# Patient Record
Sex: MALE | Race: WHITE | Employment: FULL TIME | URBAN - METROPOLITAN AREA
[De-identification: names, ages, dates, MRNs, and addresses within clinical notes are randomized per-mention and may not be internally consistent; named-entity substitution may affect disease eponyms.]

---

## 2021-05-14 ENCOUNTER — APPOINTMENT (OUTPATIENT)
Dept: GENERAL RADIOLOGY | Age: 51
End: 2021-05-14
Payer: COMMERCIAL

## 2021-05-14 ENCOUNTER — HOSPITAL ENCOUNTER (EMERGENCY)
Age: 51
Discharge: HOME | End: 2021-05-14
Payer: COMMERCIAL

## 2021-05-14 VITALS
DIASTOLIC BLOOD PRESSURE: 78 MMHG | RESPIRATION RATE: 18 BRPM | BODY MASS INDEX: 32.2 KG/M2 | OXYGEN SATURATION: 97 % | SYSTOLIC BLOOD PRESSURE: 137 MMHG | WEIGHT: 230 LBS | HEIGHT: 71 IN | HEART RATE: 83 BPM | TEMPERATURE: 97.8 F

## 2021-05-14 DIAGNOSIS — M54.32 SCIATICA OF LEFT SIDE: Primary | ICD-10-CM

## 2021-05-14 PROCEDURE — 6360000002 HC RX W HCPCS: Performed by: NURSE PRACTITIONER

## 2021-05-14 PROCEDURE — 96372 THER/PROPH/DIAG INJ SC/IM: CPT

## 2021-05-14 PROCEDURE — 99284 EMERGENCY DEPT VISIT MOD MDM: CPT

## 2021-05-14 PROCEDURE — 72100 X-RAY EXAM L-S SPINE 2/3 VWS: CPT

## 2021-05-14 RX ORDER — ORPHENADRINE CITRATE 100 MG/1
100 TABLET, EXTENDED RELEASE ORAL 2 TIMES DAILY
Qty: 20 TABLET | Refills: 0 | Status: SHIPPED | OUTPATIENT
Start: 2021-05-14 | End: 2021-05-24

## 2021-05-14 RX ORDER — DEXAMETHASONE SODIUM PHOSPHATE 10 MG/ML
10 INJECTION, SOLUTION INTRAMUSCULAR; INTRAVENOUS ONCE
Status: COMPLETED | OUTPATIENT
Start: 2021-05-14 | End: 2021-05-14

## 2021-05-14 RX ORDER — KETOROLAC TROMETHAMINE 30 MG/ML
30 INJECTION, SOLUTION INTRAMUSCULAR; INTRAVENOUS ONCE
Status: COMPLETED | OUTPATIENT
Start: 2021-05-14 | End: 2021-05-14

## 2021-05-14 RX ORDER — ORPHENADRINE CITRATE 30 MG/ML
60 INJECTION INTRAMUSCULAR; INTRAVENOUS ONCE
Status: COMPLETED | OUTPATIENT
Start: 2021-05-14 | End: 2021-05-14

## 2021-05-14 RX ADMIN — KETOROLAC TROMETHAMINE 30 MG: 30 INJECTION, SOLUTION INTRAMUSCULAR; INTRAVENOUS at 01:05

## 2021-05-14 RX ADMIN — ORPHENADRINE CITRATE 60 MG: 30 INJECTION INTRAMUSCULAR; INTRAVENOUS at 01:06

## 2021-05-14 RX ADMIN — DEXAMETHASONE SODIUM PHOSPHATE 10 MG: 10 INJECTION, SOLUTION INTRAMUSCULAR; INTRAVENOUS at 01:06

## 2021-05-14 ASSESSMENT — PAIN SCALES - GENERAL
PAINLEVEL_OUTOF10: 7
PAINLEVEL_OUTOF10: 4

## 2021-05-14 ASSESSMENT — PAIN DESCRIPTION - PAIN TYPE: TYPE: ACUTE PAIN

## 2021-05-14 NOTE — ED PROVIDER NOTES
[address] [place] [address]  Department of Emergency Medicine   ED  Encounter Note  Admit Date/RoomTime: 2021 12:19 AM  ED Room:     NAME: Alejandra Frankel  : 1970  MRN: [id number]     Chief Complaint:  Leg Pain (L buttock pain that radiates down to knee, no injury)    History of Present Illness       Alejandra Frankel is a 44 y.o. old male with a prior history of recurrent intermittent self limited episodes of low back pain, presents to the emergency department by private vehicle, for non-traumatic acute, sharp left sided middle and lower lumbar spine pain with radiation, to the left knee, for 1 day(s) prior to arrival.  There has been no recent injury as it relates to today's visit. Since onset the symptoms have been persistent and is moderate in severity. He has associated signs & symptoms of nothing additional.   He denies any bladder or bowel incontinence , new weakness, tingling or paresthesias, recent back injection, recent spinal surgery, recent spinal/chiropractic manipulation, history of IVDA, fever, abdominal pain, bladder incontinence, bowel incontinence, bladder retention, bladder urgency, bowel urgency, saddle paresthesias  or sacral numbness. The pain is aggraveated by any movement and relieved by nothing in particular. He is not currently enrolled in an pain management program or managed by PCP or back specialist.      .  ROS   Pertinent positives and negatives are stated within HPI, all other systems reviewed and are negative. Past Medical History:  has a past medical history of Acute exacerbation of chronic obstructive pulmonary disease (COPD) ([address] Acute respiratory failure with hypoxia ([address] CAP (community acquired pneumonia), Hepatitis C reactive, Heroin abuse, and Tobacco abuse. Surgical History:  has a past surgical history that includes Tonsillectomy. Social History:  reports that he has been smoking cigarettes.  He has been smoking about 2.00 packs per day. He has never used smokeless tobacco. He reports that he does not drink alcohol or use drugs. Family History: family history includes Cancer in his father and mother. Allergies: Patient has no known allergies. Physical Exam   Oxygen Saturation Interpretation: Normal.        ED Triage Vitals [05/14/21 0029]   BP Temp Temp Source Pulse Resp SpO2 Height Weight   (!) 144/82 97.8 °F (36.6 °C) Oral 88 -- -- 5' 11\" (1.803 m) 230 lb (104.3 kg)         Constitutional:  Alert, development consistent with age. HEENT:  NC/NT. Airway patent. Neck:  Normal ROM. Supple. Respiratory:  Clear to auscultation and breath sounds equal.  CV:  Regular rate and rhythm, normal heart sounds, without pathological murmurs, ectopy, gallops, or rubs. GI:  Abdomen Soft, nontender, good bowel sounds. No firm or pulsatile mass. Back: middle and lower lumbar spine left sided. Tenderness: None. Swelling: no.              Range of Motion: full range with pain. CVA Tenderness: No.            Straight leg raising:  Left positive at 45 degrees. Skin:  no erythema, rash or swelling noted. Distal Function:              Motor deficit: none. Sensory deficit: none. Pulse deficit: none. Calf Tenderness:  No Bilateral.               Edema:  none Both lower extremity(s). Deep Tendon Reflexes (DTR's) to bilateral knee's and ankle's are normo-reflexive   Gait:  normal.  Integument:  Normal turgor. Warm, dry, without visible rash. Lymphatics: No lymphangitis or adenopathy noted. Neurological:  Oriented. Motor functions intact. Lab / Imaging Results   (All laboratory and radiology results have been personally reviewed by myself)  Labs:  No results found for this visit on 05/14/21. Imaging: All Radiology results interpreted by Radiologist unless otherwise noted.   XR LUMBAR SPINE (2-3 VIEWS)   Final Result Degenerative changes of the lumbar spine with preservation of intervertebral   disc spaces. No compression deformity. ED Course / Medical Decision Making     Medications   ketorolac (TORADOL) injection 30 mg (30 mg Intramuscular Given 5/14/21 0105)   orphenadrine (NORFLEX) injection 60 mg (60 mg Intramuscular Given 5/14/21 0106)   dexamethasone (PF) (DECADRON) injection 10 mg (10 mg Oral Given 5/14/21 0106)      Medical Decision Making:    Patient presented with nontraumatic back pain radiating to his left leg to his left knee. Clinical exam is consistent with sciatica. X-ray is negative for acute pathology. He had no focal neurologic deficits. He appeared well nontoxic. He is appropriate for discharge and outpatient follow-up. He is instructed to return to the emergency department any new or worsening symptoms. Plan of Care/Counseling:  I reviewed today's visit with the patient in addition to providing specific details for the plan of care and counseling regarding the diagnosis and prognosis. Questions are answered at this time and are agreeable with the plan. Assessment      1. Sciatica of left side      Plan   Discharge home. Patient condition is good    New Medications     New Prescriptions    ORPHENADRINE (NORFLEX) 100 MG EXTENDED RELEASE TABLET    Take 1 tablet by mouth 2 times daily for 10 days     Electronically signed by PERCY Urias CNP   DD: 5/14/21  **This report was transcribed using voice recognition software. Every effort was made to ensure accuracy; however, inadvertent computerized transcription errors may be present.   END OF ED PROVIDER NOTE     PERCY Juan CNP  05/14/21 0151

## 2023-02-28 ENCOUNTER — OFFICE VISIT (OUTPATIENT)
Dept: PRIMARY CARE CLINIC | Age: 53
End: 2023-02-28

## 2023-02-28 VITALS
BODY MASS INDEX: 34.58 KG/M2 | HEIGHT: 71 IN | WEIGHT: 247 LBS | SYSTOLIC BLOOD PRESSURE: 128 MMHG | DIASTOLIC BLOOD PRESSURE: 83 MMHG | RESPIRATION RATE: 20 BRPM | OXYGEN SATURATION: 92 % | TEMPERATURE: 97.7 F | HEART RATE: 87 BPM

## 2023-02-28 DIAGNOSIS — U07.1 COVID-19 VIRUS INFECTION: Primary | ICD-10-CM

## 2023-02-28 DIAGNOSIS — R06.2 WHEEZING: ICD-10-CM

## 2023-02-28 DIAGNOSIS — R06.02 SOB (SHORTNESS OF BREATH): ICD-10-CM

## 2023-02-28 LAB
INFLUENZA A ANTIGEN, POC: NEGATIVE
INFLUENZA B ANTIGEN, POC: NEGATIVE
LOT EXPIRE DATE: ABNORMAL
LOT KIT NUMBER: ABNORMAL
SARS-COV-2, POC: DETECTED
VALID INTERNAL CONTROL: ABNORMAL
VENDOR AND KIT NAME POC: ABNORMAL

## 2023-02-28 RX ORDER — IPRATROPIUM BROMIDE AND ALBUTEROL SULFATE 2.5; .5 MG/3ML; MG/3ML
1 SOLUTION RESPIRATORY (INHALATION) ONCE
Status: COMPLETED | OUTPATIENT
Start: 2023-02-28 | End: 2023-02-28

## 2023-02-28 RX ORDER — GUAIFENESIN 600 MG/1
600 TABLET, EXTENDED RELEASE ORAL 2 TIMES DAILY
Qty: 30 TABLET | Refills: 0 | Status: SHIPPED | OUTPATIENT
Start: 2023-02-28 | End: 2023-03-15

## 2023-02-28 RX ORDER — DEXAMETHASONE SODIUM PHOSPHATE 10 MG/ML
10 INJECTION INTRAMUSCULAR; INTRAVENOUS ONCE
Status: COMPLETED | OUTPATIENT
Start: 2023-02-28 | End: 2023-02-28

## 2023-02-28 RX ORDER — ALBUTEROL SULFATE 90 UG/1
2 AEROSOL, METERED RESPIRATORY (INHALATION) 4 TIMES DAILY PRN
Qty: 18 G | Refills: 0 | Status: SHIPPED | OUTPATIENT
Start: 2023-02-28

## 2023-02-28 RX ORDER — AMOXICILLIN AND CLAVULANATE POTASSIUM 875; 125 MG/1; MG/1
1 TABLET, FILM COATED ORAL 2 TIMES DAILY
Qty: 20 TABLET | Refills: 0 | Status: SHIPPED | OUTPATIENT
Start: 2023-02-28 | End: 2023-03-10

## 2023-02-28 RX ORDER — DEXAMETHASONE 6 MG/1
6 TABLET ORAL 2 TIMES DAILY WITH MEALS
Qty: 10 TABLET | Refills: 0 | Status: SHIPPED | OUTPATIENT
Start: 2023-02-28 | End: 2023-03-05

## 2023-02-28 RX ORDER — FLUTICASONE PROPIONATE AND SALMETEROL 250; 50 UG/1; UG/1
1 POWDER RESPIRATORY (INHALATION) EVERY 12 HOURS
Qty: 60 EACH | Refills: 0 | Status: SHIPPED | OUTPATIENT
Start: 2023-02-28

## 2023-02-28 RX ORDER — DOXYCYCLINE HYCLATE 100 MG
100 TABLET ORAL 2 TIMES DAILY
Qty: 20 TABLET | Refills: 0 | Status: SHIPPED | OUTPATIENT
Start: 2023-02-28 | End: 2023-03-10

## 2023-02-28 RX ORDER — GABAPENTIN 600 MG/1
TABLET ORAL
COMMUNITY
Start: 2023-02-11

## 2023-02-28 RX ORDER — CEFTRIAXONE 500 MG/1
500 INJECTION, POWDER, FOR SOLUTION INTRAMUSCULAR; INTRAVENOUS ONCE
Status: DISCONTINUED | OUTPATIENT
Start: 2023-02-28 | End: 2023-02-28

## 2023-02-28 RX ORDER — CEFTRIAXONE 1 G/1
500 INJECTION, POWDER, FOR SOLUTION INTRAMUSCULAR; INTRAVENOUS ONCE
Status: COMPLETED | OUTPATIENT
Start: 2023-02-28 | End: 2023-02-28

## 2023-02-28 RX ADMIN — IPRATROPIUM BROMIDE AND ALBUTEROL SULFATE 1 AMPULE: 2.5; .5 SOLUTION RESPIRATORY (INHALATION) at 09:47

## 2023-02-28 RX ADMIN — DEXAMETHASONE SODIUM PHOSPHATE 10 MG: 10 INJECTION INTRAMUSCULAR; INTRAVENOUS at 10:41

## 2023-02-28 RX ADMIN — CEFTRIAXONE 500 MG: 1 INJECTION, POWDER, FOR SOLUTION INTRAMUSCULAR; INTRAVENOUS at 10:46

## 2023-02-28 NOTE — PROGRESS NOTES
Chief Complaint:   Chest Congestion, Congestion, Cough, Wheezing, and Shortness of Breath      History of Present Illness   Source of history provided by:  patient. Samra Muniz is a 45 y.o. old male with a past medical history of:   Past Medical History:   Diagnosis Date    Acute exacerbation of chronic obstructive pulmonary disease (COPD) ([facility] 3/14/2014    Acute respiratory failure with hypoxia ([facility] 3/14/2014    CAP (community acquired pneumonia) 3/14/2014    Hepatitis C reactive 3/15/2014    Heroin abuse 3/14/2014    Tobacco abuse 3/14/2014        Pt presents to the [facility] care with a cough/wheezing/chest congestion/chills/SOB for the past 1 week. States the cough is  productive with yellow sputum. No  fever noted. Denies any N/V/D, abdominal pain, CP,  dizziness, or lethargy. Pt has a h/o COPD and Pneumonia. Pt is not currently being treated w/preventative or rescue inhalers. ROS    Unless otherwise stated in this report or unable to obtain because of the patient's clinical or mental status as evidenced by the medical record, this patients's positive and negative responses for Review of Systems, constitutional, psych, eyes, ENT, cardiovascular, respiratory, gastrointestinal, neurological, genitourinary, musculoskeletal, integument systems and systems related to the presenting problem are either stated in the preceding or were not pertinent or were negative for the symptoms and/or complaints related to the medical problem. Past Surgical History:  has a past surgical history that includes Tonsillectomy. Social History:  reports that he has been smoking cigarettes. He has been smoking an average of 2 packs per day. He has never used smokeless tobacco. He reports that he does not drink alcohol and does not use drugs. Family History: family history includes Cancer in his father and mother. Allergies: Patient has no known allergies.     Physical Exam         VS:  Pulse 87   Temp 97.7 °F (36.5 °C)   Resp 20   Ht 5' 11\" (1.803 m)   Wt 247 lb (112 kg)   SpO2 91%   BMI 34.45 kg/m²    Oxygen Saturation Interpretation: Normal.    Constitutional:  Alert, development consistent with age. Ears:  External Ears: Normal bilateral pinna. TM's & External Canals: TM's normal bilaterally without perforation. Canals without erythema or drainage. Nose:   There is no obvious septal defect. Mouth:  Moist bucca mucosa and normal tongue. Throat: Mild posterior pharyngeal erythema without exudates or lesions. Neck:  Supple. There is no obvious adenopathy or neck tenderness. Lungs:   Breath sounds: Normal chest expansion and breath sounds: diffuse wheezing bilaterally- audible at times, cough reproduced w/deep breathing. Initial pulse ox 91%. APPEND:  after Duoneb- some improvement, pulse ox 94-96%  Heart:  Regular rate and rhythm, normal heart sounds, without pathological murmurs, ectopy, gallops, or rubs. Skin:  Normal turgor. Warm, dry, without visible rash. Neurological:  Oriented. Motor functions intact. Lab / Imaging Results   (All laboratory and radiology results have been personally reviewed by myself)  Labs:  Results for orders placed or performed in visit on 23   POCT COVID-19 & Influenza A/B   Result Value Ref Range    VALID INTERNAL CONTROL PASS     Lot/Kit Number [id number]     Lot/Kit  date: 2024     SARS-COV-2, POC Detected (A) Not Detected    Influenza A Antigen, POC Negative Negative    Influenza B Antigen, POC Negative Negative    Vendor and kit name [employer]        Imaging: All Radiology results interpreted by Radiologist unless otherwise noted. No orders to display         Assessment / Plan     Impression(s):  1. Chest congestion    2. COVID-19 virus infection    3. Wheezing    4. SOB (shortness of breath)      Disposition:  Disposition: Advised Covid test is POSITIVE and Influenza A/B is negative. Chest Xray now, Duoneb now. Decadron 10 mg IM. Advised to start all meds as ordered. Will initiate preventative/rescue inhalers to treat COPD. Stay well hydrated. Advised on need to establish w/new PCP for COPD. Advised to go to ED w/any worsening as discussed. APPEND:  chest xray reveals no acute process however xray films reveals possible ground glass opacities      Steps to help prevent the spread of COVID-19 if you are sick  SOURCE - https://nicolasMilabrathayer.info/. html     Stay home except to get medical care   Stay home: People who are mildly ill with COVID-19 are able to isolate at home during their illness. You should restrict activities outside your home, except for getting medical care. Avoid public areas: Do not go to work, school, or public areas. Avoid public transportation: Avoid using public transportation, ride-sharing, or taxis. Separate yourself from other people and animals in your home   Stay away from others: As much as possible, you should stay in a specific room and away from other people in your home. Also, you should use a separate bathroom, if available. Limit contact with pets & animals: You should restrict contact with pets and other animals while you are sick with COVID-19, just like you would around other people. Although there have not been reports of pets or other animals becoming sick with COVID-19, it is still recommended that people sick with COVID-19 limit contact with animals until more information is known about the virus. When possible, have another member of your household care for your animals while you are sick. If you are sick with COVID-19, avoid contact with your pet, including petting, snuggling, being kissed or licked, and sharing food. If you must care for your pet or be around animals while you are sick, wash your hands before and after you interact with pets and wear a facemask. See COVID-19 and Animals for more information.        Other considerations  The ill person should eat/be fed in their room if possible. Non-disposable  items used should be handled with gloves and washed with hot water or in a . Clean hands after handling used  items. If possible, dedicate a lined trash can for the ill person. Use gloves when removing garbage bags, handling, and disposing of trash. Wash hands after handling or disposing of trash. Consider consulting with your local health department about trash disposal guidance if available. Information for Household Members and Caregivers of Someone who is Sick   Call ahead before visiting your doctor   Call ahead: If you have a medical appointment, call the healthcare provider and tell them that you have or may have COVID-19. This will help the healthcare provider's office take steps to keep other people from getting infected or exposed. Wear a facemask if you are sick   If you are sick: You should wear a facemask when you are around other people (e.g., sharing a room or vehicle) or pets and before you enter a healthcare provider's office. If you are caring for others: If the person who is sick is not able to wear a facemask (for example, because it causes trouble breathing), then people who live with the person who is sick should not stay in the same room with them, or they should wear a facemask if they enter a room with the person who is sick. Cover your coughs and sneezes   Cover: Cover your mouth and nose with a tissue when you cough or sneeze. Dispose: Throw used tissues in a lined trash can. Wash hands: Immediately wash your hands with soap and water for at least 20 seconds or, if soap and water are not available, clean your hands with an alcohol-based hand  that contains at least 60% alcohol.   Clean your hands often   Wash hands: Wash your hands often with soap and water for at least 20 seconds, especially after blowing your nose, coughing, or sneezing; going to the bathroom; and before eating or preparing food. Hand : If soap and water are not readily available, use an alcohol-based hand  with at least 60% alcohol, covering all surfaces of your hands and rubbing them together until they feel dry. Soap and water: Soap and water are the best option if hands are visibly dirty. Avoid touching: Avoid touching your eyes, nose, and mouth with unwashed hands. Handwashing Tips   Wet your hands with clean, running water (warm or cold), turn off the tap, and apply soap. Lather your hands by rubbing them together with the soap. Lather the backs of your hands, between your fingers, and under your nails. Scrub your hands for at least 20 seconds. Need a timer? Hum the [place] from beginning to end twice. Rinse your hands well under clean, running water. Dry your hands using a clean towel or air dry them. Avoid sharing personal household items   Do not share: You should not share dishes, drinking glasses, cups, eating utensils, towels, or bedding with other people or pets in your home. Wash thoroughly after use: After using these items, they should be washed thoroughly with soap and water. Clean all high-touch surfaces everyday   Clean and disinfect: Practice routine cleaning of high touch surfaces. High touch surfaces include counters, tabletops, doorknobs, bathroom fixtures, toilets, phones, keyboards, tablets, and bedside tables. Disinfect areas with bodily fluids: Also, clean any surfaces that may have blood, stool, or body fluids on them. Household : Use a household cleaning spray or wipe, according to the label instructions. Labels contain instructions for safe and effective use of the cleaning product including precautions you should take when applying the product, such as wearing gloves and making sure you have good ventilation during use of the product.     Monitor your symptoms   Seek medical attention: Seek prompt medical attention if your illness is worsening     (e.g., difficulty breathing). Call your doctor: Before seeking care, call your healthcare provider and tell them that you have, or are being evaluated for, COVID-19. Wear a facemask when sick: Put on a facemask before you enter the facility. These steps will help the healthcare provider's office to keep other people in the office or waiting room from getting infected or exposed. Alert health department: Ask your healthcare provider to call the local or state health department. Persons who are placed under active monitoring or facilitated self-monitoring should follow instructions provided by their local health department or occupational health professionals, as appropriate. Call 911 if you have a medical emergency: If you have a medical emergency and need to call 911, notify the dispatch personnel that you have, or are being evaluated for COVID-19. If possible, put on a facemask before emergency medical services arrive.

## 2024-02-06 ENCOUNTER — TRANSCRIBE ORDERS (OUTPATIENT)
Dept: ADMINISTRATIVE | Age: 54
End: 2024-02-06

## 2024-02-06 DIAGNOSIS — R60.9 EDEMA, UNSPECIFIED TYPE: Primary | ICD-10-CM

## 2024-03-01 ENCOUNTER — HOSPITAL ENCOUNTER (OUTPATIENT)
Dept: ULTRASOUND IMAGING | Age: 54
Discharge: HOME | End: 2024-03-01
Payer: COMMERCIAL

## 2024-03-01 DIAGNOSIS — R60.9 EDEMA, UNSPECIFIED TYPE: ICD-10-CM

## 2024-03-01 PROCEDURE — 93970 EXTREMITY STUDY: CPT

## 2024-03-03 ENCOUNTER — HOSPITAL ENCOUNTER (INPATIENT)
Age: 54
LOS: 9 days | Discharge: HOME | DRG: 871 | End: 2024-03-12
Attending: EMERGENCY MEDICINE | Admitting: STUDENT IN AN ORGANIZED HEALTH CARE EDUCATION/TRAINING PROGRAM
Payer: COMMERCIAL

## 2024-03-03 ENCOUNTER — APPOINTMENT (OUTPATIENT)
Dept: GENERAL RADIOLOGY | Age: 54
DRG: 871 | End: 2024-03-03
Payer: COMMERCIAL

## 2024-03-03 ENCOUNTER — APPOINTMENT (OUTPATIENT)
Dept: CT IMAGING | Age: 54
DRG: 871 | End: 2024-03-03
Payer: COMMERCIAL

## 2024-03-03 DIAGNOSIS — J18.9 COMMUNITY ACQUIRED PNEUMONIA, UNSPECIFIED LATERALITY: ICD-10-CM

## 2024-03-03 DIAGNOSIS — R65.20 SEVERE SEPSIS (HCC): ICD-10-CM

## 2024-03-03 DIAGNOSIS — J18.9 PNEUMONIA OF BOTH LOWER LOBES DUE TO INFECTIOUS ORGANISM: ICD-10-CM

## 2024-03-03 DIAGNOSIS — J96.01 ACUTE HYPOXIC RESPIRATORY FAILURE (HCC): Primary | ICD-10-CM

## 2024-03-03 DIAGNOSIS — N17.9 AKI (ACUTE KIDNEY INJURY) (HCC): ICD-10-CM

## 2024-03-03 DIAGNOSIS — A41.9 SEVERE SEPSIS (HCC): ICD-10-CM

## 2024-03-03 DIAGNOSIS — J96.01 ACUTE RESPIRATORY FAILURE WITH HYPOXIA (HCC): ICD-10-CM

## 2024-03-03 LAB
ALBUMIN SERPL-MCNC: 4 G/DL (ref 3.5–5.2)
ALP SERPL-CCNC: 82 U/L (ref 40–129)
ALT SERPL-CCNC: 20 U/L (ref 0–40)
ANION GAP SERPL CALCULATED.3IONS-SCNC: 17 MMOL/L (ref 7–16)
AST SERPL-CCNC: 27 U/L (ref 0–39)
B.E.: -2.5 MMOL/L (ref -3–3)
B.E.: -3.2 MMOL/L (ref -3–3)
BASOPHILS # BLD: 0.06 K/UL (ref 0–0.2)
BASOPHILS NFR BLD: 0 % (ref 0–2)
BILIRUB SERPL-MCNC: 0.7 MG/DL (ref 0–1.2)
BNP SERPL-MCNC: 3169 PG/ML (ref 0–125)
BUN SERPL-MCNC: 27 MG/DL (ref 6–20)
CALCIUM SERPL-MCNC: 9.4 MG/DL (ref 8.6–10.2)
CHLORIDE SERPL-SCNC: 93 MMOL/L (ref 98–107)
CO2 SERPL-SCNC: 26 MMOL/L (ref 22–29)
COHB: 2 % (ref 0–1.5)
COHB: 3.3 % (ref 0–1.5)
CREAT SERPL-MCNC: 2.5 MG/DL (ref 0.7–1.2)
CRITICAL: ABNORMAL
CRITICAL: ABNORMAL
DATE ANALYZED: ABNORMAL
DATE ANALYZED: ABNORMAL
DATE OF COLLECTION: ABNORMAL
DATE OF COLLECTION: ABNORMAL
EOSINOPHIL # BLD: 0.05 K/UL (ref 0.05–0.5)
EOSINOPHILS RELATIVE PERCENT: 0 % (ref 0–6)
ERYTHROCYTE [DISTWIDTH] IN BLOOD BY AUTOMATED COUNT: 14.4 % (ref 11.5–15)
FIO2: 60 %
GFR SERPL CREATININE-BSD FRML MDRD: 30 ML/MIN/1.73M2
GLUCOSE SERPL-MCNC: 146 MG/DL (ref 74–99)
HCO3: 21.8 MMOL/L (ref 22–26)
HCO3: 23.7 MMOL/L (ref 22–26)
HCT VFR BLD AUTO: 47.2 % (ref 37–54)
HGB BLD-MCNC: 14.9 G/DL (ref 12.5–16.5)
HHB: 3.9 % (ref 0–5)
HHB: 7.5 % (ref 0–5)
IMM GRANULOCYTES # BLD AUTO: 0.68 K/UL (ref 0–0.58)
IMM GRANULOCYTES NFR BLD: 3 % (ref 0–5)
INFLUENZA A BY PCR: NOT DETECTED
INFLUENZA B BY PCR: NOT DETECTED
LAB: ABNORMAL
LAB: ABNORMAL
LACTATE BLDV-SCNC: 6.3 MMOL/L (ref 0.5–2.2)
LACTATE BLDV-SCNC: 6.9 MMOL/L (ref 0.5–1.9)
LIPASE SERPL-CCNC: 7 U/L (ref 13–60)
LYMPHOCYTES NFR BLD: 1.55 K/UL (ref 1.5–4)
LYMPHOCYTES RELATIVE PERCENT: 6 % (ref 20–42)
Lab: 2020
Lab: 2347
MAGNESIUM SERPL-MCNC: 1.6 MG/DL (ref 1.6–2.6)
MCH RBC QN AUTO: 31.5 PG (ref 26–35)
MCHC RBC AUTO-ENTMCNC: 31.6 G/DL (ref 32–34.5)
MCV RBC AUTO: 99.8 FL (ref 80–99.9)
METHB: 0.3 % (ref 0–1.5)
METHB: 0.3 % (ref 0–1.5)
MODE: ABNORMAL
MODE: ABNORMAL
MONOCYTES NFR BLD: 0.87 K/UL (ref 0.1–0.95)
MONOCYTES NFR BLD: 3 % (ref 2–12)
NEUTROPHILS NFR BLD: 87 % (ref 43–80)
NEUTS SEG NFR BLD: 22.3 K/UL (ref 1.8–7.3)
O2 CONTENT: 18.5 ML/DL
O2 CONTENT: 18.9 ML/DL
O2 SATURATION: 92.2 % (ref 92–98.5)
O2 SATURATION: 96 % (ref 92–98.5)
O2HB: 88.9 % (ref 94–97)
O2HB: 93.8 % (ref 94–97)
OPERATOR ID: 5323
OPERATOR ID: 7296
PATIENT TEMP: 37 C
PATIENT TEMP: 37 C
PCO2: 39 MMHG (ref 35–45)
PCO2: 46.5 MMHG (ref 35–45)
PFO2: 1.32 MMHG/%
PH BLOOD GAS: 7.33 (ref 7.35–7.45)
PH BLOOD GAS: 7.37 (ref 7.35–7.45)
PIP: 12 CMH2O
PLATELET # BLD AUTO: 178 K/UL (ref 130–450)
PMV BLD AUTO: 10.5 FL (ref 7–12)
PO2: 59.5 MMHG (ref 75–100)
PO2: 79.2 MMHG (ref 75–100)
POTASSIUM SERPL-SCNC: 4.5 MMOL/L (ref 3.5–5)
PROT SERPL-MCNC: 7.9 G/DL (ref 6.4–8.3)
PS: 6 CMH20
RBC # BLD AUTO: 4.73 M/UL (ref 3.8–5.8)
RBC # BLD: ABNORMAL 10*6/UL
RI(T): 3.76
SARS-COV-2 RDRP RESP QL NAA+PROBE: NOT DETECTED
SODIUM SERPL-SCNC: 136 MMOL/L (ref 132–146)
SOURCE, BLOOD GAS: ABNORMAL
SOURCE, BLOOD GAS: ABNORMAL
SPECIMEN DESCRIPTION: NORMAL
THB: 14.3 G/DL (ref 11.5–16.5)
THB: 14.8 G/DL (ref 11.5–16.5)
TIME ANALYZED: 2033
TIME ANALYZED: 2357
TROPONIN I SERPL HS-MCNC: 11 NG/L (ref 0–11)
TROPONIN I SERPL HS-MCNC: 13 NG/L (ref 0–11)
WBC # BLD: ABNORMAL 10*3/UL
WBC # BLD: ABNORMAL 10*3/UL
WBC OTHER # BLD: 25.5 K/UL (ref 4.5–11.5)

## 2024-03-03 PROCEDURE — 80053 COMPREHEN METABOLIC PANEL: CPT

## 2024-03-03 PROCEDURE — 96375 TX/PRO/DX INJ NEW DRUG ADDON: CPT

## 2024-03-03 PROCEDURE — 87502 INFLUENZA DNA AMP PROBE: CPT

## 2024-03-03 PROCEDURE — 94640 AIRWAY INHALATION TREATMENT: CPT

## 2024-03-03 PROCEDURE — 2580000003 HC RX 258: Performed by: STUDENT IN AN ORGANIZED HEALTH CARE EDUCATION/TRAINING PROGRAM

## 2024-03-03 PROCEDURE — 87040 BLOOD CULTURE FOR BACTERIA: CPT

## 2024-03-03 PROCEDURE — 93005 ELECTROCARDIOGRAM TRACING: CPT

## 2024-03-03 PROCEDURE — 71250 CT THORAX DX C-: CPT

## 2024-03-03 PROCEDURE — 5A09357 ASSISTANCE WITH RESPIRATORY VENTILATION, LESS THAN 24 CONSECUTIVE HOURS, CONTINUOUS POSITIVE AIRWAY PRESSURE: ICD-10-PCS | Performed by: STUDENT IN AN ORGANIZED HEALTH CARE EDUCATION/TRAINING PROGRAM

## 2024-03-03 PROCEDURE — 71046 X-RAY EXAM CHEST 2 VIEWS: CPT

## 2024-03-03 PROCEDURE — 94660 CPAP INITIATION&MGMT: CPT

## 2024-03-03 PROCEDURE — 6360000002 HC RX W HCPCS

## 2024-03-03 PROCEDURE — 99285 EMERGENCY DEPT VISIT HI MDM: CPT

## 2024-03-03 PROCEDURE — 6370000000 HC RX 637 (ALT 250 FOR IP)

## 2024-03-03 PROCEDURE — 82805 BLOOD GASES W/O2 SATURATION: CPT

## 2024-03-03 PROCEDURE — 83735 ASSAY OF MAGNESIUM: CPT

## 2024-03-03 PROCEDURE — 85025 COMPLETE CBC W/AUTO DIFF WBC: CPT

## 2024-03-03 PROCEDURE — 87635 SARS-COV-2 COVID-19 AMP PRB: CPT

## 2024-03-03 PROCEDURE — 99223 1ST HOSP IP/OBS HIGH 75: CPT | Performed by: STUDENT IN AN ORGANIZED HEALTH CARE EDUCATION/TRAINING PROGRAM

## 2024-03-03 PROCEDURE — 0202U NFCT DS 22 TRGT SARS-COV-2: CPT

## 2024-03-03 PROCEDURE — 83690 ASSAY OF LIPASE: CPT

## 2024-03-03 PROCEDURE — 83605 ASSAY OF LACTIC ACID: CPT

## 2024-03-03 PROCEDURE — 84484 ASSAY OF TROPONIN QUANT: CPT

## 2024-03-03 PROCEDURE — 96365 THER/PROPH/DIAG IV INF INIT: CPT

## 2024-03-03 PROCEDURE — 74176 CT ABD & PELVIS W/O CONTRAST: CPT

## 2024-03-03 PROCEDURE — 83880 ASSAY OF NATRIURETIC PEPTIDE: CPT

## 2024-03-03 PROCEDURE — 2580000003 HC RX 258

## 2024-03-03 PROCEDURE — 2060000000 HC ICU INTERMEDIATE R&B

## 2024-03-03 RX ORDER — POTASSIUM CHLORIDE 7.45 MG/ML
10 INJECTION INTRAVENOUS PRN
Status: DISCONTINUED | OUTPATIENT
Start: 2024-03-03 | End: 2024-03-12

## 2024-03-03 RX ORDER — METHYLPREDNISOLONE SODIUM SUCCINATE 125 MG/2ML
125 INJECTION, POWDER, LYOPHILIZED, FOR SOLUTION INTRAMUSCULAR; INTRAVENOUS ONCE
Status: COMPLETED | OUTPATIENT
Start: 2024-03-03 | End: 2024-03-03

## 2024-03-03 RX ORDER — SODIUM CHLORIDE 0.9 % (FLUSH) 0.9 %
5-40 SYRINGE (ML) INJECTION PRN
Status: DISCONTINUED | OUTPATIENT
Start: 2024-03-03 | End: 2024-03-12 | Stop reason: HOSPADM

## 2024-03-03 RX ORDER — SODIUM CHLORIDE, SODIUM LACTATE, POTASSIUM CHLORIDE, AND CALCIUM CHLORIDE .6; .31; .03; .02 G/100ML; G/100ML; G/100ML; G/100ML
1000 INJECTION, SOLUTION INTRAVENOUS ONCE
Status: COMPLETED | OUTPATIENT
Start: 2024-03-03 | End: 2024-03-03

## 2024-03-03 RX ORDER — ACETAMINOPHEN 650 MG/1
650 SUPPOSITORY RECTAL EVERY 6 HOURS PRN
Status: DISCONTINUED | OUTPATIENT
Start: 2024-03-03 | End: 2024-03-12 | Stop reason: HOSPADM

## 2024-03-03 RX ORDER — ARFORMOTEROL TARTRATE 15 UG/2ML
15 SOLUTION RESPIRATORY (INHALATION)
Status: DISCONTINUED | OUTPATIENT
Start: 2024-03-03 | End: 2024-03-12 | Stop reason: HOSPADM

## 2024-03-03 RX ORDER — IPRATROPIUM BROMIDE AND ALBUTEROL SULFATE 2.5; .5 MG/3ML; MG/3ML
3 SOLUTION RESPIRATORY (INHALATION) ONCE
Status: COMPLETED | OUTPATIENT
Start: 2024-03-03 | End: 2024-03-03

## 2024-03-03 RX ORDER — SODIUM CHLORIDE 9 MG/ML
INJECTION, SOLUTION INTRAVENOUS PRN
Status: DISCONTINUED | OUTPATIENT
Start: 2024-03-03 | End: 2024-03-03 | Stop reason: SDUPTHER

## 2024-03-03 RX ORDER — POLYETHYLENE GLYCOL 3350 17 G/17G
17 POWDER, FOR SOLUTION ORAL DAILY PRN
Status: DISCONTINUED | OUTPATIENT
Start: 2024-03-03 | End: 2024-03-12 | Stop reason: HOSPADM

## 2024-03-03 RX ORDER — ONDANSETRON 2 MG/ML
4 INJECTION INTRAMUSCULAR; INTRAVENOUS EVERY 6 HOURS PRN
Status: DISCONTINUED | OUTPATIENT
Start: 2024-03-03 | End: 2024-03-12 | Stop reason: HOSPADM

## 2024-03-03 RX ORDER — SODIUM CHLORIDE 0.9 % (FLUSH) 0.9 %
5-40 SYRINGE (ML) INJECTION EVERY 12 HOURS SCHEDULED
Status: DISCONTINUED | OUTPATIENT
Start: 2024-03-03 | End: 2024-03-12 | Stop reason: HOSPADM

## 2024-03-03 RX ORDER — SODIUM CHLORIDE, SODIUM LACTATE, POTASSIUM CHLORIDE, AND CALCIUM CHLORIDE .6; .31; .03; .02 G/100ML; G/100ML; G/100ML; G/100ML
30 INJECTION, SOLUTION INTRAVENOUS ONCE
Status: DISCONTINUED | OUTPATIENT
Start: 2024-03-03 | End: 2024-03-03

## 2024-03-03 RX ORDER — BUDESONIDE AND FORMOTEROL FUMARATE DIHYDRATE 80; 4.5 UG/1; UG/1
2 AEROSOL RESPIRATORY (INHALATION)
Status: DISCONTINUED | OUTPATIENT
Start: 2024-03-03 | End: 2024-03-03 | Stop reason: CLARIF

## 2024-03-03 RX ORDER — BUPRENORPHINE HYDROCHLORIDE AND NALOXONE HYDROCHLORIDE DIHYDRATE 8; 2 MG/1; MG/1
1 TABLET SUBLINGUAL 2 TIMES DAILY
Status: DISCONTINUED | OUTPATIENT
Start: 2024-03-04 | End: 2024-03-12 | Stop reason: HOSPADM

## 2024-03-03 RX ORDER — ENOXAPARIN SODIUM 100 MG/ML
30 INJECTION SUBCUTANEOUS 2 TIMES DAILY
Status: DISCONTINUED | OUTPATIENT
Start: 2024-03-04 | End: 2024-03-12 | Stop reason: HOSPADM

## 2024-03-03 RX ORDER — 0.9 % SODIUM CHLORIDE 0.9 %
1000 INTRAVENOUS SOLUTION INTRAVENOUS ONCE
Status: COMPLETED | OUTPATIENT
Start: 2024-03-03 | End: 2024-03-04

## 2024-03-03 RX ORDER — POTASSIUM CHLORIDE 20 MEQ/1
40 TABLET, EXTENDED RELEASE ORAL PRN
Status: DISCONTINUED | OUTPATIENT
Start: 2024-03-03 | End: 2024-03-12

## 2024-03-03 RX ORDER — FAMOTIDINE 20 MG/1
20 TABLET, FILM COATED ORAL DAILY
Status: DISCONTINUED | OUTPATIENT
Start: 2024-03-04 | End: 2024-03-12 | Stop reason: HOSPADM

## 2024-03-03 RX ORDER — ACETAMINOPHEN 325 MG/1
650 TABLET ORAL EVERY 6 HOURS PRN
Status: DISCONTINUED | OUTPATIENT
Start: 2024-03-03 | End: 2024-03-12 | Stop reason: HOSPADM

## 2024-03-03 RX ORDER — SODIUM CHLORIDE 9 MG/ML
INJECTION, SOLUTION INTRAVENOUS PRN
Status: DISCONTINUED | OUTPATIENT
Start: 2024-03-03 | End: 2024-03-12 | Stop reason: HOSPADM

## 2024-03-03 RX ORDER — ONDANSETRON 4 MG/1
4 TABLET, ORALLY DISINTEGRATING ORAL EVERY 8 HOURS PRN
Status: DISCONTINUED | OUTPATIENT
Start: 2024-03-03 | End: 2024-03-12 | Stop reason: HOSPADM

## 2024-03-03 RX ORDER — MAGNESIUM SULFATE IN WATER 40 MG/ML
2000 INJECTION, SOLUTION INTRAVENOUS PRN
Status: DISCONTINUED | OUTPATIENT
Start: 2024-03-03 | End: 2024-03-12 | Stop reason: HOSPADM

## 2024-03-03 RX ORDER — IPRATROPIUM BROMIDE AND ALBUTEROL SULFATE 2.5; .5 MG/3ML; MG/3ML
1 SOLUTION RESPIRATORY (INHALATION)
Status: DISCONTINUED | OUTPATIENT
Start: 2024-03-04 | End: 2024-03-12 | Stop reason: HOSPADM

## 2024-03-03 RX ORDER — METHYLPREDNISOLONE SODIUM SUCCINATE 125 MG/2ML
60 INJECTION, POWDER, LYOPHILIZED, FOR SOLUTION INTRAMUSCULAR; INTRAVENOUS DAILY
Status: DISCONTINUED | OUTPATIENT
Start: 2024-03-04 | End: 2024-03-04

## 2024-03-03 RX ORDER — GABAPENTIN 600 MG/1
600 TABLET ORAL 3 TIMES DAILY
Status: DISCONTINUED | OUTPATIENT
Start: 2024-03-04 | End: 2024-03-04 | Stop reason: CLARIF

## 2024-03-03 RX ORDER — SODIUM CHLORIDE 9 MG/ML
INJECTION, SOLUTION INTRAVENOUS CONTINUOUS
Status: DISCONTINUED | OUTPATIENT
Start: 2024-03-03 | End: 2024-03-04

## 2024-03-03 RX ORDER — BUDESONIDE 0.5 MG/2ML
0.5 INHALANT ORAL
Status: DISCONTINUED | OUTPATIENT
Start: 2024-03-03 | End: 2024-03-12 | Stop reason: HOSPADM

## 2024-03-03 RX ADMIN — VANCOMYCIN HYDROCHLORIDE 2000 MG: 10 INJECTION, POWDER, LYOPHILIZED, FOR SOLUTION INTRAVENOUS at 23:16

## 2024-03-03 RX ADMIN — SODIUM CHLORIDE, PRESERVATIVE FREE 10 ML: 5 INJECTION INTRAVENOUS at 21:36

## 2024-03-03 RX ADMIN — METHYLPREDNISOLONE SODIUM SUCCINATE 125 MG: 125 INJECTION INTRAMUSCULAR; INTRAVENOUS at 19:06

## 2024-03-03 RX ADMIN — SODIUM CHLORIDE, POTASSIUM CHLORIDE, SODIUM LACTATE AND CALCIUM CHLORIDE 1000 ML: 600; 310; 30; 20 INJECTION, SOLUTION INTRAVENOUS at 20:06

## 2024-03-03 RX ADMIN — SODIUM CHLORIDE 1000 ML: 9 INJECTION, SOLUTION INTRAVENOUS at 23:51

## 2024-03-03 RX ADMIN — IPRATROPIUM BROMIDE AND ALBUTEROL SULFATE 3 DOSE: 2.5; .5 SOLUTION RESPIRATORY (INHALATION) at 19:01

## 2024-03-03 RX ADMIN — PIPERACILLIN AND TAZOBACTAM 3375 MG: 3; .375 INJECTION, POWDER, FOR SOLUTION INTRAVENOUS at 21:34

## 2024-03-03 ASSESSMENT — PAIN - FUNCTIONAL ASSESSMENT
PAIN_FUNCTIONAL_ASSESSMENT: NONE - DENIES PAIN
PAIN_FUNCTIONAL_ASSESSMENT: 0-10
PAIN_FUNCTIONAL_ASSESSMENT: NONE - DENIES PAIN

## 2024-03-03 ASSESSMENT — PAIN SCALES - GENERAL: PAINLEVEL_OUTOF10: 7

## 2024-03-03 ASSESSMENT — PAIN DESCRIPTION - FREQUENCY: FREQUENCY: CONTINUOUS

## 2024-03-03 ASSESSMENT — PAIN DESCRIPTION - ORIENTATION: ORIENTATION: LEFT;LOWER

## 2024-03-03 ASSESSMENT — PAIN DESCRIPTION - DESCRIPTORS: DESCRIPTORS: SHARP

## 2024-03-03 ASSESSMENT — PAIN DESCRIPTION - LOCATION: LOCATION: ABDOMEN

## 2024-03-03 ASSESSMENT — PAIN DESCRIPTION - PAIN TYPE: TYPE: ACUTE PAIN

## 2024-03-03 NOTE — ED PROVIDER NOTES
Department of Emergency Medicine   ED  Provider Note  Admit Date/RoomTime: 3/3/2024  6:10 PM  ED Room: [facility]/0621-A                History of Present Illness:  3/3/24, Time: 8:18 PM SANDRA De Luna is a 53 y.o. male with a past medical history of COPD, CAD, drug use disorder on Suboxone, tobacco use disorder, coming in for complaints of shortness of breath and abdominal pain.  Patient reports of becoming short of breath yesterday.  States that he has been having some chills.  Was recently seen in the ED for DVT rule out in which ultrasound was negative.  Patient tried over-the-counter Mucinex which did not help.  Patient has COPD not on oxygen, has not been using more of his inhalers.  Patient does have a extensive smoking history and endorses smoking 1 pack/day.  Of note, patient did have remote history of pneumonia in the past, and is worried that this is the beginning of something similar.        Review of Systems:   Pertinent positives and negatives are stated within HPI, all other systems reviewed and are negative.        --------------------------------------------- PAST HISTORY ---------------------------------------------  Past Medical History:  has a past medical history of Acute exacerbation of chronic obstructive pulmonary disease (COPD) ([facility]), Acute respiratory failure with hypoxia ([facility]), CAP (community acquired pneumonia), Hepatitis C reactive, Heroin abuse ([facility]), and Tobacco abuse.    Past Surgical History:  has a past surgical history that includes Tonsillectomy.    Social History:  reports that he has been smoking cigarettes. He has never used smokeless tobacco. He reports that he does not drink alcohol and does not use drugs.    Family History: family history includes Cancer in his father and mother.     The patient’s home medications have been reviewed.    Allergies: Patient has no known allergies.        ---------------------------------------------------PHYSICAL

## 2024-03-04 ENCOUNTER — APPOINTMENT (OUTPATIENT)
Dept: GENERAL RADIOLOGY | Age: 54
DRG: 871 | End: 2024-03-04
Payer: COMMERCIAL

## 2024-03-04 ENCOUNTER — APPOINTMENT (OUTPATIENT)
Dept: ULTRASOUND IMAGING | Age: 54
DRG: 871 | End: 2024-03-04
Payer: COMMERCIAL

## 2024-03-04 ENCOUNTER — APPOINTMENT (OUTPATIENT)
Age: 54
DRG: 871 | End: 2024-03-04
Attending: INTERNAL MEDICINE
Payer: COMMERCIAL

## 2024-03-04 LAB
ALBUMIN SERPL-MCNC: 3.3 G/DL (ref 3.5–5.2)
ALP SERPL-CCNC: 57 U/L (ref 40–129)
ALT SERPL-CCNC: 25 U/L (ref 0–40)
ANION GAP SERPL CALCULATED.3IONS-SCNC: 11 MMOL/L (ref 7–16)
AST SERPL-CCNC: 35 U/L (ref 0–39)
ATYPICAL LYMPHOCYTE ABSOLUTE COUNT: 0.17 K/UL (ref 0–0.46)
ATYPICAL LYMPHOCYTES: 1 % (ref 0–4)
B PARAP IS1001 DNA NPH QL NAA+NON-PROBE: NOT DETECTED
B PERT DNA SPEC QL NAA+PROBE: NOT DETECTED
BASOPHILS # BLD: 0 K/UL (ref 0–0.2)
BASOPHILS NFR BLD: 0 % (ref 0–2)
BILIRUB SERPL-MCNC: 0.4 MG/DL (ref 0–1.2)
BILIRUB UR QL STRIP: NEGATIVE
BUN SERPL-MCNC: 29 MG/DL (ref 6–20)
C PNEUM DNA NPH QL NAA+NON-PROBE: NOT DETECTED
CALCIUM SERPL-MCNC: 8.5 MG/DL (ref 8.6–10.2)
CHLORIDE SERPL-SCNC: 100 MMOL/L (ref 98–107)
CLARITY UR: CLEAR
CO2 SERPL-SCNC: 23 MMOL/L (ref 22–29)
COLOR UR: YELLOW
CREAT SERPL-MCNC: 1 MG/DL (ref 0.7–1.2)
CREAT UR-MCNC: 36 MG/DL (ref 40–278)
CRP SERPL HS-MCNC: 266 MG/L (ref 0–5)
ECHO AO ASC DIAM: 3.1 CM
ECHO AO ASCENDING AORTA INDEX: 1.35 CM/M2
ECHO AV AREA PEAK VELOCITY: 2.6 CM2
ECHO AV AREA VTI: 2.5 CM2
ECHO AV AREA/BSA PEAK VELOCITY: 1.1 CM2/M2
ECHO AV AREA/BSA VTI: 1.1 CM2/M2
ECHO AV CUSP MM: 2 CM
ECHO AV MEAN GRADIENT: 7 MMHG
ECHO AV MEAN VELOCITY: 1.3 M/S
ECHO AV PEAK GRADIENT: 13 MMHG
ECHO AV PEAK VELOCITY: 1.8 M/S
ECHO AV VELOCITY RATIO: 0.72
ECHO AV VTI: 34.1 CM
ECHO BSA: 2.35 M2
ECHO EST RA PRESSURE: 3 MMHG
ECHO LA DIAMETER INDEX: 1.83 CM/M2
ECHO LA DIAMETER: 4.2 CM
ECHO LA VOL A-L A2C: 83 ML (ref 18–58)
ECHO LA VOL A-L A4C: 53 ML (ref 18–58)
ECHO LA VOL MOD A2C: 80 ML (ref 18–58)
ECHO LA VOL MOD A4C: 51 ML (ref 18–58)
ECHO LA VOLUME AREA LENGTH: 72 ML
ECHO LA VOLUME INDEX A-L A2C: 36 ML/M2 (ref 16–34)
ECHO LA VOLUME INDEX A-L A4C: 23 ML/M2 (ref 16–34)
ECHO LA VOLUME INDEX AREA LENGTH: 31 ML/M2 (ref 16–34)
ECHO LA VOLUME INDEX MOD A2C: 35 ML/M2 (ref 16–34)
ECHO LA VOLUME INDEX MOD A4C: 22 ML/M2 (ref 16–34)
ECHO LV E' LATERAL VELOCITY: 13 CM/S
ECHO LV E' SEPTAL VELOCITY: 10 CM/S
ECHO LV EDV A2C: 128 ML
ECHO LV EDV A4C: 89 ML
ECHO LV EDV BP: 111 ML (ref 67–155)
ECHO LV EDV INDEX A4C: 39 ML/M2
ECHO LV EDV INDEX BP: 48 ML/M2
ECHO LV EDV NDEX A2C: 56 ML/M2
ECHO LV EJECTION FRACTION A2C: 63 %
ECHO LV EJECTION FRACTION A4C: 63 %
ECHO LV EJECTION FRACTION BIPLANE: 64 % (ref 55–100)
ECHO LV ESV A2C: 47 ML
ECHO LV ESV A4C: 33 ML
ECHO LV ESV BP: 40 ML (ref 22–58)
ECHO LV ESV INDEX A2C: 20 ML/M2
ECHO LV ESV INDEX A4C: 14 ML/M2
ECHO LV ESV INDEX BP: 17 ML/M2
ECHO LV FRACTIONAL SHORTENING: 29 % (ref 28–44)
ECHO LV INTERNAL DIMENSION DIASTOLE INDEX: 2.52 CM/M2
ECHO LV INTERNAL DIMENSION DIASTOLIC: 5.8 CM (ref 4.2–5.9)
ECHO LV INTERNAL DIMENSION SYSTOLIC INDEX: 1.78 CM/M2
ECHO LV INTERNAL DIMENSION SYSTOLIC: 4.1 CM
ECHO LV ISOVOLUMETRIC RELAXATION TIME (IVRT): 55.4 MS
ECHO LV IVSD: 0.9 CM (ref 0.6–1)
ECHO LV IVSS: 1.4 CM
ECHO LV MASS 2D: 203.5 G (ref 88–224)
ECHO LV MASS INDEX 2D: 88.5 G/M2 (ref 49–115)
ECHO LV POSTERIOR WALL DIASTOLIC: 0.9 CM (ref 0.6–1)
ECHO LV POSTERIOR WALL SYSTOLIC: 1.3 CM
ECHO LV RELATIVE WALL THICKNESS RATIO: 0.31
ECHO LVOT AREA: 3.5 CM2
ECHO LVOT AV VTI INDEX: 0.7
ECHO LVOT DIAM: 2.1 CM
ECHO LVOT MEAN GRADIENT: 4 MMHG
ECHO LVOT PEAK GRADIENT: 7 MMHG
ECHO LVOT PEAK VELOCITY: 1.3 M/S
ECHO LVOT STROKE VOLUME INDEX: 36 ML/M2
ECHO LVOT SV: 82.7 ML
ECHO LVOT VTI: 23.9 CM
ECHO MV "A" WAVE DURATION: 117.7 MSEC
ECHO MV A VELOCITY: 0.86 M/S
ECHO MV AREA PHT: 4.3 CM2
ECHO MV AREA VTI: 4.2 CM2
ECHO MV E DECELERATION TIME (DT): 125.6 MS
ECHO MV E VELOCITY: 0.96 M/S
ECHO MV E/A RATIO: 1.12
ECHO MV E/E' LATERAL: 7.38
ECHO MV E/E' RATIO (AVERAGED): 8.49
ECHO MV LVOT VTI INDEX: 0.83
ECHO MV MAX VELOCITY: 1.1 M/S
ECHO MV MEAN GRADIENT: 2 MMHG
ECHO MV MEAN VELOCITY: 0.6 M/S
ECHO MV PEAK GRADIENT: 4 MMHG
ECHO MV PRESSURE HALF TIME (PHT): 51.7 MS
ECHO MV VTI: 19.8 CM
ECHO PV MAX VELOCITY: 1.1 M/S
ECHO PV MEAN GRADIENT: 3 MMHG
ECHO PV MEAN VELOCITY: 0.8 M/S
ECHO PV PEAK GRADIENT: 5 MMHG
ECHO PV VTI: 20.5 CM
ECHO PVEIN A DURATION: 96.9 MS
ECHO PVEIN A VELOCITY: 0.3 M/S
ECHO PVEIN PEAK D VELOCITY: 0.6 M/S
ECHO PVEIN PEAK S VELOCITY: 0.7 M/S
ECHO PVEIN S/D RATIO: 1.2
ECHO RIGHT VENTRICULAR SYSTOLIC PRESSURE (RVSP): 49 MMHG
ECHO RV INTERNAL DIMENSION: 3.4 CM
ECHO RV TAPSE: 2.5 CM (ref 1.7–?)
ECHO TV REGURGITANT MAX VELOCITY: 3.38 M/S
ECHO TV REGURGITANT PEAK GRADIENT: 46 MMHG
EKG ATRIAL RATE: 90 BPM
EKG P AXIS: 38 DEGREES
EKG P-R INTERVAL: 140 MS
EKG Q-T INTERVAL: 344 MS
EKG QRS DURATION: 106 MS
EKG QTC CALCULATION (BAZETT): 420 MS
EKG R AXIS: 50 DEGREES
EKG T AXIS: 56 DEGREES
EKG VENTRICULAR RATE: 90 BPM
EOSINOPHIL # BLD: 0 K/UL (ref 0.05–0.5)
EOSINOPHILS RELATIVE PERCENT: 0 % (ref 0–6)
ERYTHROCYTE [DISTWIDTH] IN BLOOD BY AUTOMATED COUNT: 14.6 % (ref 11.5–15)
ERYTHROCYTE [SEDIMENTATION RATE] IN BLOOD BY WESTERGREN METHOD: 24 MM/HR (ref 0–15)
FLUAV RNA NPH QL NAA+NON-PROBE: NOT DETECTED
FLUBV RNA NPH QL NAA+NON-PROBE: NOT DETECTED
GFR SERPL CREATININE-BSD FRML MDRD: >60 ML/MIN/1.73M2
GLUCOSE BLD-MCNC: 308 MG/DL (ref 74–99)
GLUCOSE BLD-MCNC: 380 MG/DL (ref 74–99)
GLUCOSE BLD-MCNC: 400 MG/DL (ref 74–99)
GLUCOSE BLD-MCNC: >500 MG/DL (ref 74–99)
GLUCOSE SERPL-MCNC: 223 MG/DL (ref 74–99)
GLUCOSE UR STRIP-MCNC: >=1000 MG/DL
HADV DNA NPH QL NAA+NON-PROBE: NOT DETECTED
HBA1C MFR BLD: 6 % (ref 4–5.6)
HCOV 229E RNA NPH QL NAA+NON-PROBE: NOT DETECTED
HCOV HKU1 RNA NPH QL NAA+NON-PROBE: NOT DETECTED
HCOV NL63 RNA NPH QL NAA+NON-PROBE: NOT DETECTED
HCOV OC43 RNA NPH QL NAA+NON-PROBE: NOT DETECTED
HCT VFR BLD AUTO: 41.7 % (ref 37–54)
HGB BLD-MCNC: 13.2 G/DL (ref 12.5–16.5)
HGB UR QL STRIP.AUTO: ABNORMAL
HMPV RNA NPH QL NAA+NON-PROBE: NOT DETECTED
HPIV1 RNA NPH QL NAA+NON-PROBE: NOT DETECTED
HPIV2 RNA NPH QL NAA+NON-PROBE: NOT DETECTED
HPIV3 RNA NPH QL NAA+NON-PROBE: NOT DETECTED
HPIV4 RNA NPH QL NAA+NON-PROBE: NOT DETECTED
KETONES UR STRIP-MCNC: NEGATIVE MG/DL
LACTATE BLDV-SCNC: 3.5 MMOL/L (ref 0.5–2.2)
LACTATE BLDV-SCNC: 4.9 MMOL/L (ref 0.5–2.2)
LACTATE BLDV-SCNC: 6.4 MMOL/L (ref 0.5–2.2)
LEUKOCYTE ESTERASE UR QL STRIP: NEGATIVE
LYMPHOCYTES NFR BLD: 0.33 K/UL (ref 1.5–4)
LYMPHOCYTES RELATIVE PERCENT: 2 % (ref 20–42)
M PNEUMO DNA NPH QL NAA+NON-PROBE: NOT DETECTED
MCH RBC QN AUTO: 31.2 PG (ref 26–35)
MCHC RBC AUTO-ENTMCNC: 31.7 G/DL (ref 32–34.5)
MCV RBC AUTO: 98.6 FL (ref 80–99.9)
METAMYELOCYTES ABSOLUTE COUNT: 0.33 K/UL (ref 0–0.12)
METAMYELOCYTES: 2 % (ref 0–1)
MONOCYTES NFR BLD: 0.5 K/UL (ref 0.1–0.95)
MONOCYTES NFR BLD: 3 % (ref 2–12)
NEUTROPHILS NFR BLD: 92 % (ref 43–80)
NEUTS SEG NFR BLD: 15.18 K/UL (ref 1.8–7.3)
NITRITE UR QL STRIP: NEGATIVE
OSMOLALITY UR: 604 MOSM/KG (ref 300–900)
PH UR STRIP: 6 [PH] (ref 5–9)
PLATELET # BLD AUTO: 157 K/UL (ref 130–450)
PMV BLD AUTO: 10.6 FL (ref 7–12)
POTASSIUM SERPL-SCNC: 4.3 MMOL/L (ref 3.5–5)
PROCALCITONIN SERPL-MCNC: 15.52 NG/ML (ref 0–0.08)
PROT SERPL-MCNC: 6.7 G/DL (ref 6.4–8.3)
PROT UR STRIP-MCNC: NEGATIVE MG/DL
RBC # BLD AUTO: 4.23 M/UL (ref 3.8–5.8)
RBC # BLD: NORMAL 10*6/UL
RBC #/AREA URNS HPF: NORMAL /HPF
RSV RNA NPH QL NAA+NON-PROBE: NOT DETECTED
RV+EV RNA NPH QL NAA+NON-PROBE: NOT DETECTED
SARS-COV-2 RNA NPH QL NAA+NON-PROBE: NOT DETECTED
SODIUM SERPL-SCNC: 134 MMOL/L (ref 132–146)
SODIUM UR-SCNC: <20 MMOL/L
SP GR UR STRIP: <1.005 (ref 1–1.03)
SPECIMEN DESCRIPTION: NORMAL
TOTAL PROTEIN, URINE: 10 MG/DL (ref 0–12)
URINE TOTAL PROTEIN CREATININE RATIO: 0.27 (ref 0–0.2)
UROBILINOGEN UR STRIP-ACNC: 0.2 EU/DL (ref 0–1)
WBC #/AREA URNS HPF: NORMAL /HPF
WBC OTHER # BLD: 16.5 K/UL (ref 4.5–11.5)

## 2024-03-04 PROCEDURE — 6360000002 HC RX W HCPCS: Performed by: INTERNAL MEDICINE

## 2024-03-04 PROCEDURE — 94669 MECHANICAL CHEST WALL OSCILL: CPT

## 2024-03-04 PROCEDURE — 2580000003 HC RX 258: Performed by: STUDENT IN AN ORGANIZED HEALTH CARE EDUCATION/TRAINING PROGRAM

## 2024-03-04 PROCEDURE — 87449 NOS EACH ORGANISM AG IA: CPT

## 2024-03-04 PROCEDURE — 87205 SMEAR GRAM STAIN: CPT

## 2024-03-04 PROCEDURE — 84156 ASSAY OF PROTEIN URINE: CPT

## 2024-03-04 PROCEDURE — 82570 ASSAY OF URINE CREATININE: CPT

## 2024-03-04 PROCEDURE — 84145 PROCALCITONIN (PCT): CPT

## 2024-03-04 PROCEDURE — 82962 GLUCOSE BLOOD TEST: CPT

## 2024-03-04 PROCEDURE — 6370000000 HC RX 637 (ALT 250 FOR IP): Performed by: STUDENT IN AN ORGANIZED HEALTH CARE EDUCATION/TRAINING PROGRAM

## 2024-03-04 PROCEDURE — 85025 COMPLETE CBC W/AUTO DIFF WBC: CPT

## 2024-03-04 PROCEDURE — 93010 ELECTROCARDIOGRAM REPORT: CPT | Performed by: INTERNAL MEDICINE

## 2024-03-04 PROCEDURE — 6360000004 HC RX CONTRAST MEDICATION: Performed by: INTERNAL MEDICINE

## 2024-03-04 PROCEDURE — 83036 HEMOGLOBIN GLYCOSYLATED A1C: CPT

## 2024-03-04 PROCEDURE — 93306 TTE W/DOPPLER COMPLETE: CPT | Performed by: INTERNAL MEDICINE

## 2024-03-04 PROCEDURE — 87899 AGENT NOS ASSAY W/OPTIC: CPT

## 2024-03-04 PROCEDURE — 76770 US EXAM ABDO BACK WALL COMP: CPT

## 2024-03-04 PROCEDURE — 81001 URINALYSIS AUTO W/SCOPE: CPT

## 2024-03-04 PROCEDURE — 86140 C-REACTIVE PROTEIN: CPT

## 2024-03-04 PROCEDURE — 6360000002 HC RX W HCPCS: Performed by: STUDENT IN AN ORGANIZED HEALTH CARE EDUCATION/TRAINING PROGRAM

## 2024-03-04 PROCEDURE — 2700000000 HC OXYGEN THERAPY PER DAY

## 2024-03-04 PROCEDURE — 99233 SBSQ HOSP IP/OBS HIGH 50: CPT | Performed by: STUDENT IN AN ORGANIZED HEALTH CARE EDUCATION/TRAINING PROGRAM

## 2024-03-04 PROCEDURE — 83935 ASSAY OF URINE OSMOLALITY: CPT

## 2024-03-04 PROCEDURE — 6370000000 HC RX 637 (ALT 250 FOR IP): Performed by: NURSE PRACTITIONER

## 2024-03-04 PROCEDURE — 71045 X-RAY EXAM CHEST 1 VIEW: CPT

## 2024-03-04 PROCEDURE — 85652 RBC SED RATE AUTOMATED: CPT

## 2024-03-04 PROCEDURE — 82043 UR ALBUMIN QUANTITATIVE: CPT

## 2024-03-04 PROCEDURE — 36415 COLL VENOUS BLD VENIPUNCTURE: CPT

## 2024-03-04 PROCEDURE — 83605 ASSAY OF LACTIC ACID: CPT

## 2024-03-04 PROCEDURE — 6370000000 HC RX 637 (ALT 250 FOR IP)

## 2024-03-04 PROCEDURE — 84300 ASSAY OF URINE SODIUM: CPT

## 2024-03-04 PROCEDURE — 2060000000 HC ICU INTERMEDIATE R&B

## 2024-03-04 PROCEDURE — 87070 CULTURE OTHR SPECIMN AEROBIC: CPT

## 2024-03-04 PROCEDURE — C8929 TTE W OR WO FOL WCON,DOPPLER: HCPCS

## 2024-03-04 PROCEDURE — 94660 CPAP INITIATION&MGMT: CPT

## 2024-03-04 PROCEDURE — 80053 COMPREHEN METABOLIC PANEL: CPT

## 2024-03-04 PROCEDURE — 94640 AIRWAY INHALATION TREATMENT: CPT

## 2024-03-04 RX ORDER — METHYLPREDNISOLONE SODIUM SUCCINATE 40 MG/ML
40 INJECTION, POWDER, LYOPHILIZED, FOR SOLUTION INTRAMUSCULAR; INTRAVENOUS EVERY 6 HOURS
Status: DISCONTINUED | OUTPATIENT
Start: 2024-03-04 | End: 2024-03-07

## 2024-03-04 RX ORDER — INSULIN LISPRO 100 [IU]/ML
6 INJECTION, SOLUTION INTRAVENOUS; SUBCUTANEOUS ONCE
Status: COMPLETED | OUTPATIENT
Start: 2024-03-04 | End: 2024-03-04

## 2024-03-04 RX ORDER — GLUCAGON 1 MG/ML
1 KIT INJECTION PRN
Status: DISCONTINUED | OUTPATIENT
Start: 2024-03-04 | End: 2024-03-12 | Stop reason: HOSPADM

## 2024-03-04 RX ORDER — GABAPENTIN 300 MG/1
600 CAPSULE ORAL 3 TIMES DAILY
Status: DISCONTINUED | OUTPATIENT
Start: 2024-03-04 | End: 2024-03-12 | Stop reason: HOSPADM

## 2024-03-04 RX ORDER — DEXTROSE MONOHYDRATE 100 MG/ML
INJECTION, SOLUTION INTRAVENOUS CONTINUOUS PRN
Status: DISCONTINUED | OUTPATIENT
Start: 2024-03-04 | End: 2024-03-12 | Stop reason: HOSPADM

## 2024-03-04 RX ORDER — INSULIN LISPRO 100 [IU]/ML
0-8 INJECTION, SOLUTION INTRAVENOUS; SUBCUTANEOUS
Status: DISCONTINUED | OUTPATIENT
Start: 2024-03-04 | End: 2024-03-12 | Stop reason: HOSPADM

## 2024-03-04 RX ORDER — INSULIN LISPRO 100 [IU]/ML
4 INJECTION, SOLUTION INTRAVENOUS; SUBCUTANEOUS ONCE
Status: COMPLETED | OUTPATIENT
Start: 2024-03-04 | End: 2024-03-04

## 2024-03-04 RX ORDER — GUAIFENESIN 400 MG/1
400 TABLET ORAL 4 TIMES DAILY PRN
Status: DISCONTINUED | OUTPATIENT
Start: 2024-03-04 | End: 2024-03-12 | Stop reason: HOSPADM

## 2024-03-04 RX ORDER — NICOTINE 21 MG/24HR
1 PATCH, TRANSDERMAL 24 HOURS TRANSDERMAL DAILY
Status: DISCONTINUED | OUTPATIENT
Start: 2024-03-04 | End: 2024-03-12 | Stop reason: HOSPADM

## 2024-03-04 RX ORDER — INSULIN LISPRO 100 [IU]/ML
0-4 INJECTION, SOLUTION INTRAVENOUS; SUBCUTANEOUS NIGHTLY
Status: DISCONTINUED | OUTPATIENT
Start: 2024-03-04 | End: 2024-03-12 | Stop reason: HOSPADM

## 2024-03-04 RX ADMIN — IPRATROPIUM BROMIDE AND ALBUTEROL SULFATE 1 DOSE: 2.5; .5 SOLUTION RESPIRATORY (INHALATION) at 23:47

## 2024-03-04 RX ADMIN — BUPRENORPHINE HYDROCHLORIDE AND NALOXONE HYDROCHLORIDE DIHYDRATE 1 TABLET: 8; 2 TABLET SUBLINGUAL at 20:22

## 2024-03-04 RX ADMIN — FAMOTIDINE 20 MG: 20 TABLET ORAL at 09:01

## 2024-03-04 RX ADMIN — IPRATROPIUM BROMIDE AND ALBUTEROL SULFATE 1 DOSE: 2.5; .5 SOLUTION RESPIRATORY (INHALATION) at 19:28

## 2024-03-04 RX ADMIN — METHYLPREDNISOLONE SODIUM SUCCINATE 60 MG: 125 INJECTION INTRAMUSCULAR; INTRAVENOUS at 09:02

## 2024-03-04 RX ADMIN — IPRATROPIUM BROMIDE AND ALBUTEROL SULFATE 1 DOSE: 2.5; .5 SOLUTION RESPIRATORY (INHALATION) at 00:32

## 2024-03-04 RX ADMIN — ACETAMINOPHEN 650 MG: 325 TABLET ORAL at 09:12

## 2024-03-04 RX ADMIN — BUPRENORPHINE HYDROCHLORIDE AND NALOXONE HYDROCHLORIDE DIHYDRATE 1 TABLET: 8; 2 TABLET SUBLINGUAL at 09:01

## 2024-03-04 RX ADMIN — PIPERACILLIN AND TAZOBACTAM 4500 MG: 4; .5 INJECTION, POWDER, FOR SOLUTION INTRAVENOUS at 15:27

## 2024-03-04 RX ADMIN — PIPERACILLIN AND TAZOBACTAM 4500 MG: 4; .5 INJECTION, POWDER, FOR SOLUTION INTRAVENOUS at 22:32

## 2024-03-04 RX ADMIN — INSULIN LISPRO 4 UNITS: 100 INJECTION, SOLUTION INTRAVENOUS; SUBCUTANEOUS at 20:46

## 2024-03-04 RX ADMIN — INSULIN LISPRO 6 UNITS: 100 INJECTION, SOLUTION INTRAVENOUS; SUBCUTANEOUS at 15:52

## 2024-03-04 RX ADMIN — ARFORMOTEROL TARTRATE 15 MCG: 15 SOLUTION RESPIRATORY (INHALATION) at 08:30

## 2024-03-04 RX ADMIN — ENOXAPARIN SODIUM 30 MG: 100 INJECTION SUBCUTANEOUS at 20:28

## 2024-03-04 RX ADMIN — GABAPENTIN 600 MG: 300 CAPSULE ORAL at 09:02

## 2024-03-04 RX ADMIN — ARFORMOTEROL TARTRATE 15 MCG: 15 SOLUTION RESPIRATORY (INHALATION) at 00:32

## 2024-03-04 RX ADMIN — IPRATROPIUM BROMIDE AND ALBUTEROL SULFATE 1 DOSE: 2.5; .5 SOLUTION RESPIRATORY (INHALATION) at 15:54

## 2024-03-04 RX ADMIN — ENOXAPARIN SODIUM 30 MG: 100 INJECTION SUBCUTANEOUS at 09:02

## 2024-03-04 RX ADMIN — SODIUM CHLORIDE, PRESERVATIVE FREE 10 ML: 5 INJECTION INTRAVENOUS at 09:03

## 2024-03-04 RX ADMIN — IPRATROPIUM BROMIDE AND ALBUTEROL SULFATE 1 DOSE: 2.5; .5 SOLUTION RESPIRATORY (INHALATION) at 08:30

## 2024-03-04 RX ADMIN — IPRATROPIUM BROMIDE AND ALBUTEROL SULFATE 1 DOSE: 2.5; .5 SOLUTION RESPIRATORY (INHALATION) at 04:35

## 2024-03-04 RX ADMIN — BUDESONIDE INHALATION 500 MCG: 0.5 SUSPENSION RESPIRATORY (INHALATION) at 19:28

## 2024-03-04 RX ADMIN — VANCOMYCIN HYDROCHLORIDE 1250 MG: 10 INJECTION, POWDER, LYOPHILIZED, FOR SOLUTION INTRAVENOUS at 18:21

## 2024-03-04 RX ADMIN — SODIUM CHLORIDE, PRESERVATIVE FREE 10 ML: 5 INJECTION INTRAVENOUS at 20:28

## 2024-03-04 RX ADMIN — BUDESONIDE INHALATION 500 MCG: 0.5 SUSPENSION RESPIRATORY (INHALATION) at 08:30

## 2024-03-04 RX ADMIN — METHYLPREDNISOLONE SODIUM SUCCINATE 40 MG: 40 INJECTION, POWDER, LYOPHILIZED, FOR SOLUTION INTRAMUSCULAR; INTRAVENOUS at 20:22

## 2024-03-04 RX ADMIN — ARFORMOTEROL TARTRATE 15 MCG: 15 SOLUTION RESPIRATORY (INHALATION) at 19:28

## 2024-03-04 RX ADMIN — PERFLUTREN 1.5 ML: 6.52 INJECTION, SUSPENSION INTRAVENOUS at 11:34

## 2024-03-04 RX ADMIN — INSULIN LISPRO 4 UNITS: 100 INJECTION, SOLUTION INTRAVENOUS; SUBCUTANEOUS at 20:35

## 2024-03-04 RX ADMIN — METHYLPREDNISOLONE SODIUM SUCCINATE 40 MG: 40 INJECTION, POWDER, LYOPHILIZED, FOR SOLUTION INTRAMUSCULAR; INTRAVENOUS at 15:13

## 2024-03-04 RX ADMIN — BUDESONIDE INHALATION 500 MCG: 0.5 SUSPENSION RESPIRATORY (INHALATION) at 00:32

## 2024-03-04 RX ADMIN — INSULIN LISPRO 6 UNITS: 100 INJECTION, SOLUTION INTRAVENOUS; SUBCUTANEOUS at 23:19

## 2024-03-04 RX ADMIN — GABAPENTIN 600 MG: 300 CAPSULE ORAL at 15:12

## 2024-03-04 RX ADMIN — PIPERACILLIN AND TAZOBACTAM 4500 MG: 4; .5 INJECTION, POWDER, FOR SOLUTION INTRAVENOUS at 05:02

## 2024-03-04 RX ADMIN — IPRATROPIUM BROMIDE AND ALBUTEROL SULFATE 1 DOSE: 2.5; .5 SOLUTION RESPIRATORY (INHALATION) at 12:42

## 2024-03-04 RX ADMIN — GABAPENTIN 600 MG: 300 CAPSULE ORAL at 20:22

## 2024-03-04 RX ADMIN — SODIUM CHLORIDE: 9 INJECTION, SOLUTION INTRAVENOUS at 01:28

## 2024-03-04 ASSESSMENT — PAIN DESCRIPTION - LOCATION: LOCATION: HEAD

## 2024-03-04 ASSESSMENT — PAIN - FUNCTIONAL ASSESSMENT: PAIN_FUNCTIONAL_ASSESSMENT: NONE - DENIES PAIN

## 2024-03-04 ASSESSMENT — PAIN SCALES - GENERAL
PAINLEVEL_OUTOF10: 0
PAINLEVEL_OUTOF10: 5

## 2024-03-04 ASSESSMENT — LIFESTYLE VARIABLES
HOW OFTEN DO YOU HAVE A DRINK CONTAINING ALCOHOL: PATIENT DECLINED
HOW MANY STANDARD DRINKS CONTAINING ALCOHOL DO YOU HAVE ON A TYPICAL DAY: PATIENT DECLINED

## 2024-03-04 NOTE — ED NOTES
Patient SPO2 88% on 3L nasal cannula. Raised patient's O2 to 5L via nasal cannula. Provider notified.

## 2024-03-04 NOTE — CARE COORDINATION
Social work / Discharge Planning:         Social work attempted to see patient for initial assessment but he was out of the room for testing.      Per IDR, patient is independent from home.    He is currently requiring 4 liters of oxygen which he does not have at home.    Will need pulse ox testing when discharge is anticipated.     On IV Zosyn, Vancomycin and Solumedrol.   Electronically signed by MORELIA Quiles on 3/4/2024 at 11:01 AM

## 2024-03-04 NOTE — CONSULTS
exotic pets, turtles or exotic birds.       Vaccines:    Influenza:  Refused  Pneumococcal Polysaccharide:  Refused    There is no immunization history on file for this patient.     Home Meds: Medications Prior to Admission: gabapentin (NEURONTIN) 600 MG tablet, take 1 tablet by mouth three times a day  fluticasone-salmeterol (ADVAIR DISKUS) 250-50 MCG/ACT AEPB diskus inhaler, Inhale 1 puff into the lungs in the morning and 1 puff in the evening.  albuterol sulfate HFA (VENTOLIN HFA) 108 (90 Base) MCG/ACT inhaler, Inhale 2 puffs into the lungs 4 times daily as needed for Wheezing  albuterol sulfate HFA (VENTOLIN HFA) 108 (90 Base) MCG/ACT inhaler, Inhale 2 puffs into the lungs every 6 hours as needed for Wheezing (Patient not taking: Reported on 2/28/2023)  buprenorphine-naloxone (SUBOXONE) 8-2 MG FILM SL film, Place 1 Film under the tongue 2 times daily.    CURRENT MEDS :  Scheduled Meds:   gabapentin  600 mg Oral TID    sodium chloride flush  5-40 mL IntraVENous 2 times per day    sodium chloride flush  5-40 mL IntraVENous 2 times per day    enoxaparin  30 mg SubCUTAneous BID    famotidine  20 mg Oral Daily    ipratropium 0.5 mg-albuterol 2.5 mg  1 Dose Inhalation Q4H RT    methylPREDNISolone  60 mg IntraVENous Daily    piperacillin-tazobactam  4,500 mg IntraVENous q8h    vancomycin  1,500 mg IntraVENous Q12H    buprenorphine-naloxone  1 tablet SubLINGual BID    budesonide  0.5 mg Nebulization BID RT    And    arformoterol tartrate  15 mcg Nebulization BID RT       Continuous Infusions:   sodium chloride      sodium chloride 125 mL/hr at 03/04/24 0128       No Known Allergies    REVIEW OF SYSTEMS:  Constitutional: Denies fever, weight loss, night sweats, and fatigue  Skin: Denies pigmentation, dark lesions, and rashes   HEENT: Denies hearing loss, tinnitus, ear drainage, epistaxis, sore throat, and hoarseness.  Cardiovascular: Denies palpitations, chest pain, and chest pressure.  Respiratory: + cough, denies 
3/3/2024 7:44 pm    IMPRESSION:  1.  Multifocal lobular consolidative processes predominantly in the bilateral  lower lungs.  There also ill-defined opacities in the bilateral upper lungs,  right middle lobe, and lingula.     (Imaging features most suggestive of changes related to an underlying  infectious/inflammatory process.  Follow-up to resolution is recommended to  exclude other pathologies.)     2.  Prominent mediastinal and hilar lymph nodes, likely reactive.     3.  No pleural effusion or pneumothorax.  Mild centrilobular and paraseptal  emphysematous changes in the lungs.     4.  Coronary artery disease and atherosclerotic disease.     5.  No acute intra-pathology seen.  No evidence of bowel obstruction or  inflammation.  The appendix is not seen.  Mild stool burden seen throughout  the colon.     6.  Mildly hydropic appearing gallbladder with cholelithiasis.  No signs of  gallbladder inflammation.     7.  Fatty infiltration of the pancreas.  The remainder of the study is as  above.    IMPRESSION/RECOMMENDATIONS:  Mr. Scott De Luna is a 54 yo male with a PMH of COPD, hepatitis C, and drug abuse, who presented to the ED with complaints of fever, chills, and SOB. ED work up revealed creatinine 2.5, reason for this consult. His home medications include Suboxone.    MODESTO, prerenal MODESTO 2/2 volume depletion with poor PO intake in the setting of NSAID use, creatinine peaked at 2.5, improving to 1 with IVF administration  PNA, on Zosyn  COPD  Drug abuse, on Suboxone   Obesity  Tobacco use, nicotine patch    Plan:    Stop IVF  Obtain urine indices  Obtain renal US  Obtain CXR today  Monitor renal function    Thank you Dr. Bustos for allowing us to participate in the care of Mr. De Luna.     Case and plan discussed with Dr. Norris.     Lis Mcneill, APRN - CNP  Agree as annotated'  Diamond Norris MD

## 2024-03-04 NOTE — ED NOTES
ED to Inpatient Handoff Report    Notified JASKARAN Hurtado that electronic handoff available and patient ready for transport to room 0621.    Safety Risks: Risk of falls    Patient in Restraints: no    Constant Observer or Patient : no    Telemetry Monitoring Ordered :Yes           Order to transfer to unit without monitor:N/A    Last MEWS: 1 Time completed: 2349    Deterioration Index Score:   Predictive Model Details          33 (Caution)  Factor Value    Calculated 3/4/2024 00:03 33% Supplemental oxygen PAP (positive airway pressure)    Deterioration Index Model 28% Age 53 years old     11% WBC count abnormal (25.5 k/uL)     8% Potassium 4.5 mmol/L     7% Respiratory rate 18     3% Systolic 129     3% BUN abnormal (27 mg/dL)     3% Pulse 93     2% Blood pH abnormal (7.326)     2% Sodium 136 mmol/L     0% Hematocrit 47.2 %     0% Temperature 97.9 °F (36.6 °C)     0% Pulse oximetry 96 %        Vitals:    03/03/24 2204 03/03/24 2234 03/03/24 2250 03/03/24 2349   BP: (!) 101/52  112/86 129/77   Pulse: 90 94 91 93   Resp: 19 20 18 18   Temp: 97.8 °F (36.6 °C)   97.9 °F (36.6 °C)   TempSrc: Oral   Axillary   SpO2: 91% 95% 96% 96%   Weight:       Height:             Opportunity for questions and clarification was provided.

## 2024-03-04 NOTE — H&P
Madalyn Perez, APRN - CNP   albuterol sulfate HFA (VENTOLIN HFA) 108 (90 Base) MCG/ACT inhaler Inhale 2 puffs into the lungs 4 times daily as needed for Wheezing 2/28/23   Madalyn Perez, APRN - CNP   albuterol sulfate HFA (VENTOLIN HFA) 108 (90 Base) MCG/ACT inhaler Inhale 2 puffs into the lungs every 6 hours as needed for Wheezing  Patient not taking: Reported on 2/28/2023 11/15/17   Sal Brock MD   buprenorphine-naloxone (SUBOXONE) 8-2 MG FILM SL film Place 2 each under the tongue 2 times daily    Provider, MD Rachel       Allergies:    Patient has no known allergies.    Social History:    reports that he has been smoking cigarettes. He has never used smokeless tobacco. He reports that he does not drink alcohol and does not use drugs.    Family History:   family history includes Cancer in his father and mother.       PHYSICAL EXAM:  Vitals:  BP (!) 101/52   Pulse 94   Temp 97.8 °F (36.6 °C) (Oral)   Resp 20   Ht 1.829 m (6')   Wt 108.9 kg (240 lb)   SpO2 95%   BMI 32.55 kg/m²     General Appearance: alert and oriented to person, place and time and in no acute distress  Skin: warm and dry  Head: normocephalic and atraumatic  Eyes: pupils equal, round, and reactive to light, extraocular eye movements intact, conjunctivae normal  Neck: neck supple and non tender without mass   Pulmonary/Chest: Fine crepitations bilaterally scattered diffusely throughout the lungs.  No wheeze, equal air entry.  Cardiovascular: normal rate, normal S1 and S2 and no carotid bruits  Abdomen: soft, non-tender, non-distended, normal bowel sounds, no masses or organomegaly  Extremities: no cyanosis, no clubbing and no edema  Neurologic: no cranial nerve deficit and speech normal        LABS: Reviewed labs  Recent Labs     03/03/24  1859      K 4.5   CL 93*   CO2 26   BUN 27*   CREATININE 2.5*   GLUCOSE 146*   CALCIUM 9.4       Recent Labs     03/03/24  1859   WBC 25.5*   RBC 4.73   HGB 14.9   HCT 47.2   MCV

## 2024-03-05 ENCOUNTER — APPOINTMENT (OUTPATIENT)
Dept: GENERAL RADIOLOGY | Age: 54
DRG: 871 | End: 2024-03-05
Payer: COMMERCIAL

## 2024-03-05 LAB
ALBUMIN SERPL-MCNC: 3.4 G/DL (ref 3.5–5.2)
ALP SERPL-CCNC: 81 U/L (ref 40–129)
ALT SERPL-CCNC: 24 U/L (ref 0–40)
ANION GAP SERPL CALCULATED.3IONS-SCNC: 12 MMOL/L (ref 7–16)
AST SERPL-CCNC: 31 U/L (ref 0–39)
BASOPHILS # BLD: 0 K/UL (ref 0–0.2)
BASOPHILS NFR BLD: 0 % (ref 0–2)
BILIRUB SERPL-MCNC: 0.3 MG/DL (ref 0–1.2)
BUN SERPL-MCNC: 24 MG/DL (ref 6–20)
CALCIUM SERPL-MCNC: 9.1 MG/DL (ref 8.6–10.2)
CHLORIDE SERPL-SCNC: 102 MMOL/L (ref 98–107)
CO2 SERPL-SCNC: 27 MMOL/L (ref 22–29)
CREAT SERPL-MCNC: 0.8 MG/DL (ref 0.7–1.2)
EOSINOPHIL # BLD: 0 K/UL (ref 0.05–0.5)
EOSINOPHILS RELATIVE PERCENT: 0 % (ref 0–6)
ERYTHROCYTE [DISTWIDTH] IN BLOOD BY AUTOMATED COUNT: 14.6 % (ref 11.5–15)
GFR SERPL CREATININE-BSD FRML MDRD: >60 ML/MIN/1.73M2
GLUCOSE BLD-MCNC: 288 MG/DL (ref 74–99)
GLUCOSE BLD-MCNC: 331 MG/DL (ref 74–99)
GLUCOSE BLD-MCNC: 359 MG/DL (ref 74–99)
GLUCOSE BLD-MCNC: 414 MG/DL (ref 74–99)
GLUCOSE SERPL-MCNC: 371 MG/DL (ref 74–99)
HCT VFR BLD AUTO: 41.6 % (ref 37–54)
HGB BLD-MCNC: 13.2 G/DL (ref 12.5–16.5)
L PNEUMO1 AG UR QL IA.RAPID: NEGATIVE
LACTATE BLDV-SCNC: 3.3 MMOL/L (ref 0.5–2.2)
LACTATE BLDV-SCNC: 3.5 MMOL/L (ref 0.5–2.2)
LACTATE BLDV-SCNC: 3.9 MMOL/L (ref 0.5–2.2)
LYMPHOCYTES NFR BLD: 0.67 K/UL (ref 1.5–4)
LYMPHOCYTES RELATIVE PERCENT: 5 % (ref 20–42)
MCH RBC QN AUTO: 30.9 PG (ref 26–35)
MCHC RBC AUTO-ENTMCNC: 31.7 G/DL (ref 32–34.5)
MCV RBC AUTO: 97.4 FL (ref 80–99.9)
MONOCYTES NFR BLD: 0.45 K/UL (ref 0.1–0.95)
MONOCYTES NFR BLD: 4 % (ref 2–12)
NEUTROPHILS NFR BLD: 91 % (ref 43–80)
NEUTS SEG NFR BLD: 11.69 K/UL (ref 1.8–7.3)
PLATELET # BLD AUTO: 156 K/UL (ref 130–450)
PMV BLD AUTO: 10.8 FL (ref 7–12)
POTASSIUM SERPL-SCNC: 4.4 MMOL/L (ref 3.5–5)
PROT SERPL-MCNC: 6.8 G/DL (ref 6.4–8.3)
RBC # BLD AUTO: 4.27 M/UL (ref 3.8–5.8)
RBC # BLD: ABNORMAL 10*6/UL
RBC # BLD: ABNORMAL 10*6/UL
S PNEUM AG SPEC QL: NEGATIVE
SODIUM SERPL-SCNC: 141 MMOL/L (ref 132–146)
SPECIMEN SOURCE: NORMAL
VANCOMYCIN SERPL-MCNC: 9.9 UG/ML (ref 5–40)
WBC OTHER # BLD: 12.8 K/UL (ref 4.5–11.5)

## 2024-03-05 PROCEDURE — 6360000002 HC RX W HCPCS: Performed by: STUDENT IN AN ORGANIZED HEALTH CARE EDUCATION/TRAINING PROGRAM

## 2024-03-05 PROCEDURE — 83605 ASSAY OF LACTIC ACID: CPT

## 2024-03-05 PROCEDURE — 6370000000 HC RX 637 (ALT 250 FOR IP): Performed by: STUDENT IN AN ORGANIZED HEALTH CARE EDUCATION/TRAINING PROGRAM

## 2024-03-05 PROCEDURE — 71045 X-RAY EXAM CHEST 1 VIEW: CPT

## 2024-03-05 PROCEDURE — 6360000002 HC RX W HCPCS: Performed by: INTERNAL MEDICINE

## 2024-03-05 PROCEDURE — 94640 AIRWAY INHALATION TREATMENT: CPT

## 2024-03-05 PROCEDURE — 85025 COMPLETE CBC W/AUTO DIFF WBC: CPT

## 2024-03-05 PROCEDURE — 2580000003 HC RX 258: Performed by: STUDENT IN AN ORGANIZED HEALTH CARE EDUCATION/TRAINING PROGRAM

## 2024-03-05 PROCEDURE — 80053 COMPREHEN METABOLIC PANEL: CPT

## 2024-03-05 PROCEDURE — 94660 CPAP INITIATION&MGMT: CPT

## 2024-03-05 PROCEDURE — 6370000000 HC RX 637 (ALT 250 FOR IP): Performed by: NURSE PRACTITIONER

## 2024-03-05 PROCEDURE — 2580000003 HC RX 258

## 2024-03-05 PROCEDURE — 6360000002 HC RX W HCPCS

## 2024-03-05 PROCEDURE — 99232 SBSQ HOSP IP/OBS MODERATE 35: CPT | Performed by: STUDENT IN AN ORGANIZED HEALTH CARE EDUCATION/TRAINING PROGRAM

## 2024-03-05 PROCEDURE — 2700000000 HC OXYGEN THERAPY PER DAY

## 2024-03-05 PROCEDURE — 2060000000 HC ICU INTERMEDIATE R&B

## 2024-03-05 PROCEDURE — 80202 ASSAY OF VANCOMYCIN: CPT

## 2024-03-05 PROCEDURE — 82962 GLUCOSE BLOOD TEST: CPT

## 2024-03-05 RX ORDER — INSULIN LISPRO 100 [IU]/ML
5 INJECTION, SOLUTION INTRAVENOUS; SUBCUTANEOUS
Status: DISCONTINUED | OUTPATIENT
Start: 2024-03-05 | End: 2024-03-12 | Stop reason: HOSPADM

## 2024-03-05 RX ORDER — INSULIN GLARGINE 100 [IU]/ML
10 INJECTION, SOLUTION SUBCUTANEOUS NIGHTLY
Status: DISCONTINUED | OUTPATIENT
Start: 2024-03-05 | End: 2024-03-11

## 2024-03-05 RX ORDER — FUROSEMIDE 10 MG/ML
40 INJECTION INTRAMUSCULAR; INTRAVENOUS ONCE
Status: COMPLETED | OUTPATIENT
Start: 2024-03-06 | End: 2024-03-05

## 2024-03-05 RX ADMIN — GABAPENTIN 600 MG: 300 CAPSULE ORAL at 14:25

## 2024-03-05 RX ADMIN — METHYLPREDNISOLONE SODIUM SUCCINATE 40 MG: 40 INJECTION, POWDER, LYOPHILIZED, FOR SOLUTION INTRAMUSCULAR; INTRAVENOUS at 20:52

## 2024-03-05 RX ADMIN — IPRATROPIUM BROMIDE AND ALBUTEROL SULFATE 1 DOSE: 2.5; .5 SOLUTION RESPIRATORY (INHALATION) at 12:13

## 2024-03-05 RX ADMIN — METHYLPREDNISOLONE SODIUM SUCCINATE 40 MG: 40 INJECTION, POWDER, LYOPHILIZED, FOR SOLUTION INTRAMUSCULAR; INTRAVENOUS at 04:18

## 2024-03-05 RX ADMIN — VANCOMYCIN HYDROCHLORIDE 1750 MG: 5 INJECTION, POWDER, LYOPHILIZED, FOR SOLUTION INTRAVENOUS at 20:51

## 2024-03-05 RX ADMIN — ENOXAPARIN SODIUM 30 MG: 100 INJECTION SUBCUTANEOUS at 20:46

## 2024-03-05 RX ADMIN — SODIUM CHLORIDE, PRESERVATIVE FREE 10 ML: 5 INJECTION INTRAVENOUS at 20:48

## 2024-03-05 RX ADMIN — SODIUM CHLORIDE, PRESERVATIVE FREE 10 ML: 5 INJECTION INTRAVENOUS at 04:18

## 2024-03-05 RX ADMIN — ARFORMOTEROL TARTRATE 15 MCG: 15 SOLUTION RESPIRATORY (INHALATION) at 07:44

## 2024-03-05 RX ADMIN — IPRATROPIUM BROMIDE AND ALBUTEROL SULFATE 1 DOSE: 2.5; .5 SOLUTION RESPIRATORY (INHALATION) at 03:21

## 2024-03-05 RX ADMIN — SODIUM CHLORIDE, PRESERVATIVE FREE 10 ML: 5 INJECTION INTRAVENOUS at 08:43

## 2024-03-05 RX ADMIN — IPRATROPIUM BROMIDE AND ALBUTEROL SULFATE 1 DOSE: 2.5; .5 SOLUTION RESPIRATORY (INHALATION) at 15:48

## 2024-03-05 RX ADMIN — IPRATROPIUM BROMIDE AND ALBUTEROL SULFATE 1 DOSE: 2.5; .5 SOLUTION RESPIRATORY (INHALATION) at 07:44

## 2024-03-05 RX ADMIN — INSULIN LISPRO 6 UNITS: 100 INJECTION, SOLUTION INTRAVENOUS; SUBCUTANEOUS at 11:12

## 2024-03-05 RX ADMIN — METHYLPREDNISOLONE SODIUM SUCCINATE 40 MG: 40 INJECTION, POWDER, LYOPHILIZED, FOR SOLUTION INTRAMUSCULAR; INTRAVENOUS at 08:40

## 2024-03-05 RX ADMIN — ENOXAPARIN SODIUM 30 MG: 100 INJECTION SUBCUTANEOUS at 08:40

## 2024-03-05 RX ADMIN — BUPRENORPHINE HYDROCHLORIDE AND NALOXONE HYDROCHLORIDE DIHYDRATE 1 TABLET: 8; 2 TABLET SUBLINGUAL at 08:40

## 2024-03-05 RX ADMIN — ACETAMINOPHEN 650 MG: 325 TABLET ORAL at 02:59

## 2024-03-05 RX ADMIN — VANCOMYCIN HYDROCHLORIDE 1250 MG: 10 INJECTION, POWDER, LYOPHILIZED, FOR SOLUTION INTRAVENOUS at 06:50

## 2024-03-05 RX ADMIN — BUPRENORPHINE HYDROCHLORIDE AND NALOXONE HYDROCHLORIDE DIHYDRATE 1 TABLET: 8; 2 TABLET SUBLINGUAL at 20:47

## 2024-03-05 RX ADMIN — PIPERACILLIN AND TAZOBACTAM 4500 MG: 4; .5 INJECTION, POWDER, FOR SOLUTION INTRAVENOUS at 14:31

## 2024-03-05 RX ADMIN — ARFORMOTEROL TARTRATE 15 MCG: 15 SOLUTION RESPIRATORY (INHALATION) at 20:10

## 2024-03-05 RX ADMIN — INSULIN LISPRO 5 UNITS: 100 INJECTION, SOLUTION INTRAVENOUS; SUBCUTANEOUS at 18:33

## 2024-03-05 RX ADMIN — FAMOTIDINE 20 MG: 20 TABLET ORAL at 08:40

## 2024-03-05 RX ADMIN — BUDESONIDE INHALATION 500 MCG: 0.5 SUSPENSION RESPIRATORY (INHALATION) at 07:44

## 2024-03-05 RX ADMIN — FUROSEMIDE 40 MG: 10 INJECTION, SOLUTION INTRAMUSCULAR; INTRAVENOUS at 23:57

## 2024-03-05 RX ADMIN — GABAPENTIN 600 MG: 300 CAPSULE ORAL at 20:47

## 2024-03-05 RX ADMIN — PIPERACILLIN AND TAZOBACTAM 4500 MG: 4; .5 INJECTION, POWDER, FOR SOLUTION INTRAVENOUS at 23:07

## 2024-03-05 RX ADMIN — SODIUM CHLORIDE, PRESERVATIVE FREE 10 ML: 5 INJECTION INTRAVENOUS at 20:56

## 2024-03-05 RX ADMIN — GABAPENTIN 600 MG: 300 CAPSULE ORAL at 08:40

## 2024-03-05 RX ADMIN — INSULIN GLARGINE 10 UNITS: 100 INJECTION, SOLUTION SUBCUTANEOUS at 20:54

## 2024-03-05 RX ADMIN — IPRATROPIUM BROMIDE AND ALBUTEROL SULFATE 1 DOSE: 2.5; .5 SOLUTION RESPIRATORY (INHALATION) at 20:10

## 2024-03-05 RX ADMIN — INSULIN LISPRO 2 UNITS: 100 INJECTION, SOLUTION INTRAVENOUS; SUBCUTANEOUS at 06:51

## 2024-03-05 RX ADMIN — INSULIN LISPRO 8 UNITS: 100 INJECTION, SOLUTION INTRAVENOUS; SUBCUTANEOUS at 16:29

## 2024-03-05 RX ADMIN — METHYLPREDNISOLONE SODIUM SUCCINATE 40 MG: 40 INJECTION, POWDER, LYOPHILIZED, FOR SOLUTION INTRAMUSCULAR; INTRAVENOUS at 14:25

## 2024-03-05 RX ADMIN — INSULIN LISPRO 4 UNITS: 100 INJECTION, SOLUTION INTRAVENOUS; SUBCUTANEOUS at 20:53

## 2024-03-05 RX ADMIN — BUDESONIDE INHALATION 500 MCG: 0.5 SUSPENSION RESPIRATORY (INHALATION) at 20:10

## 2024-03-05 RX ADMIN — IPRATROPIUM BROMIDE AND ALBUTEROL SULFATE 1 DOSE: 2.5; .5 SOLUTION RESPIRATORY (INHALATION) at 23:44

## 2024-03-05 ASSESSMENT — PAIN DESCRIPTION - DESCRIPTORS: DESCRIPTORS: ACHING;DISCOMFORT

## 2024-03-05 ASSESSMENT — PAIN DESCRIPTION - ORIENTATION: ORIENTATION: RIGHT

## 2024-03-05 ASSESSMENT — PAIN DESCRIPTION - LOCATION: LOCATION: ARM

## 2024-03-05 ASSESSMENT — PAIN SCALES - GENERAL: PAINLEVEL_OUTOF10: 0

## 2024-03-05 ASSESSMENT — PAIN - FUNCTIONAL ASSESSMENT: PAIN_FUNCTIONAL_ASSESSMENT: ACTIVITIES ARE NOT PREVENTED

## 2024-03-05 NOTE — CARE COORDINATION
Social work / Discharge planning:         Patient is independent from home.    Requiring 4 liters of oxygen which he does not have at home.    Will need pulse ox testing when discharge is anticipated.    Currently on IV Vancomycin and Solumedrol.     Electronically signed by MORELIA Quiles on 3/5/2024 at 10:01 AM

## 2024-03-06 LAB
ALBUMIN SERPL-MCNC: 3.3 G/DL (ref 3.5–5.2)
ALP SERPL-CCNC: 113 U/L (ref 40–129)
ALT SERPL-CCNC: 19 U/L (ref 0–40)
ANION GAP SERPL CALCULATED.3IONS-SCNC: 10 MMOL/L (ref 7–16)
ANION GAP SERPL CALCULATED.3IONS-SCNC: 9 MMOL/L (ref 7–16)
AST SERPL-CCNC: 34 U/L (ref 0–39)
BASOPHILS # BLD: 0.02 K/UL (ref 0–0.2)
BASOPHILS NFR BLD: 0 % (ref 0–2)
BILIRUB SERPL-MCNC: 0.3 MG/DL (ref 0–1.2)
BNP SERPL-MCNC: 1519 PG/ML (ref 0–125)
BUN SERPL-MCNC: 21 MG/DL (ref 6–20)
BUN SERPL-MCNC: 22 MG/DL (ref 6–20)
CALCIUM SERPL-MCNC: 9.1 MG/DL (ref 8.6–10.2)
CALCIUM SERPL-MCNC: 9.3 MG/DL (ref 8.6–10.2)
CHLORIDE SERPL-SCNC: 101 MMOL/L (ref 98–107)
CHLORIDE SERPL-SCNC: 95 MMOL/L (ref 98–107)
CO2 SERPL-SCNC: 28 MMOL/L (ref 22–29)
CO2 SERPL-SCNC: 31 MMOL/L (ref 22–29)
CREAT SERPL-MCNC: 0.7 MG/DL (ref 0.7–1.2)
CREAT SERPL-MCNC: 0.7 MG/DL (ref 0.7–1.2)
CREAT UR-MCNC: 34.8 MG/DL (ref 40–278)
EOSINOPHIL # BLD: 0 K/UL (ref 0.05–0.5)
EOSINOPHILS RELATIVE PERCENT: 0 % (ref 0–6)
ERYTHROCYTE [DISTWIDTH] IN BLOOD BY AUTOMATED COUNT: 14.7 % (ref 11.5–15)
GFR SERPL CREATININE-BSD FRML MDRD: >60 ML/MIN/1.73M2
GFR SERPL CREATININE-BSD FRML MDRD: >60 ML/MIN/1.73M2
GLUCOSE BLD-MCNC: 132 MG/DL (ref 74–99)
GLUCOSE BLD-MCNC: 157 MG/DL (ref 74–99)
GLUCOSE BLD-MCNC: 197 MG/DL (ref 74–99)
GLUCOSE BLD-MCNC: 217 MG/DL (ref 74–99)
GLUCOSE SERPL-MCNC: 121 MG/DL (ref 74–99)
GLUCOSE SERPL-MCNC: 199 MG/DL (ref 74–99)
HCT VFR BLD AUTO: 42.2 % (ref 37–54)
HGB BLD-MCNC: 13.4 G/DL (ref 12.5–16.5)
IMM GRANULOCYTES # BLD AUTO: 0.05 K/UL (ref 0–0.58)
IMM GRANULOCYTES NFR BLD: 0 % (ref 0–5)
LACTATE BLDV-SCNC: 1.3 MMOL/L (ref 0.5–2.2)
LACTATE BLDV-SCNC: 2.2 MMOL/L (ref 0.5–2.2)
LACTATE BLDV-SCNC: 2.9 MMOL/L (ref 0.5–2.2)
LYMPHOCYTES NFR BLD: 0.96 K/UL (ref 1.5–4)
LYMPHOCYTES RELATIVE PERCENT: 6 % (ref 20–42)
MCH RBC QN AUTO: 31.2 PG (ref 26–35)
MCHC RBC AUTO-ENTMCNC: 31.8 G/DL (ref 32–34.5)
MCV RBC AUTO: 98.1 FL (ref 80–99.9)
MICROALBUMIN UR-MCNC: 16 MG/L (ref 0–19)
MICROALBUMIN/CREAT UR-RTO: 45 MCG/MG CREAT (ref 0–30)
MICROORGANISM SPEC CULT: NORMAL
MICROORGANISM/AGENT SPEC: NORMAL
MONOCYTES NFR BLD: 0.68 K/UL (ref 0.1–0.95)
MONOCYTES NFR BLD: 4 % (ref 2–12)
NEUTROPHILS NFR BLD: 90 % (ref 43–80)
NEUTS SEG NFR BLD: 14.55 K/UL (ref 1.8–7.3)
PLATELET # BLD AUTO: 162 K/UL (ref 130–450)
PMV BLD AUTO: 11.3 FL (ref 7–12)
POTASSIUM SERPL-SCNC: 4.5 MMOL/L (ref 3.5–5)
POTASSIUM SERPL-SCNC: 4.8 MMOL/L (ref 3.5–5)
PROT SERPL-MCNC: 6.4 G/DL (ref 6.4–8.3)
RBC # BLD AUTO: 4.3 M/UL (ref 3.8–5.8)
SODIUM SERPL-SCNC: 136 MMOL/L (ref 132–146)
SODIUM SERPL-SCNC: 138 MMOL/L (ref 132–146)
SPECIMEN DESCRIPTION: NORMAL
WBC OTHER # BLD: 16.3 K/UL (ref 4.5–11.5)

## 2024-03-06 PROCEDURE — 94669 MECHANICAL CHEST WALL OSCILL: CPT

## 2024-03-06 PROCEDURE — 2060000000 HC ICU INTERMEDIATE R&B

## 2024-03-06 PROCEDURE — 99233 SBSQ HOSP IP/OBS HIGH 50: CPT | Performed by: STUDENT IN AN ORGANIZED HEALTH CARE EDUCATION/TRAINING PROGRAM

## 2024-03-06 PROCEDURE — 83880 ASSAY OF NATRIURETIC PEPTIDE: CPT

## 2024-03-06 PROCEDURE — 2580000003 HC RX 258: Performed by: INTERNAL MEDICINE

## 2024-03-06 PROCEDURE — 94660 CPAP INITIATION&MGMT: CPT

## 2024-03-06 PROCEDURE — 83605 ASSAY OF LACTIC ACID: CPT

## 2024-03-06 PROCEDURE — 6360000002 HC RX W HCPCS: Performed by: STUDENT IN AN ORGANIZED HEALTH CARE EDUCATION/TRAINING PROGRAM

## 2024-03-06 PROCEDURE — 2580000003 HC RX 258

## 2024-03-06 PROCEDURE — 6360000002 HC RX W HCPCS: Performed by: INTERNAL MEDICINE

## 2024-03-06 PROCEDURE — 94640 AIRWAY INHALATION TREATMENT: CPT

## 2024-03-06 PROCEDURE — 2580000003 HC RX 258: Performed by: STUDENT IN AN ORGANIZED HEALTH CARE EDUCATION/TRAINING PROGRAM

## 2024-03-06 PROCEDURE — 2700000000 HC OXYGEN THERAPY PER DAY

## 2024-03-06 PROCEDURE — 36415 COLL VENOUS BLD VENIPUNCTURE: CPT

## 2024-03-06 PROCEDURE — 80053 COMPREHEN METABOLIC PANEL: CPT

## 2024-03-06 PROCEDURE — 6370000000 HC RX 637 (ALT 250 FOR IP): Performed by: STUDENT IN AN ORGANIZED HEALTH CARE EDUCATION/TRAINING PROGRAM

## 2024-03-06 PROCEDURE — 6370000000 HC RX 637 (ALT 250 FOR IP): Performed by: NURSE PRACTITIONER

## 2024-03-06 PROCEDURE — 82962 GLUCOSE BLOOD TEST: CPT

## 2024-03-06 PROCEDURE — 87081 CULTURE SCREEN ONLY: CPT

## 2024-03-06 PROCEDURE — 85025 COMPLETE CBC W/AUTO DIFF WBC: CPT

## 2024-03-06 PROCEDURE — 80048 BASIC METABOLIC PNL TOTAL CA: CPT

## 2024-03-06 RX ORDER — BUMETANIDE 0.25 MG/ML
2 INJECTION INTRAMUSCULAR; INTRAVENOUS ONCE
Status: COMPLETED | OUTPATIENT
Start: 2024-03-06 | End: 2024-03-06

## 2024-03-06 RX ADMIN — METHYLPREDNISOLONE SODIUM SUCCINATE 40 MG: 40 INJECTION, POWDER, LYOPHILIZED, FOR SOLUTION INTRAMUSCULAR; INTRAVENOUS at 16:06

## 2024-03-06 RX ADMIN — GABAPENTIN 600 MG: 300 CAPSULE ORAL at 20:39

## 2024-03-06 RX ADMIN — ARFORMOTEROL TARTRATE 15 MCG: 15 SOLUTION RESPIRATORY (INHALATION) at 10:03

## 2024-03-06 RX ADMIN — IPRATROPIUM BROMIDE AND ALBUTEROL SULFATE 1 DOSE: 2.5; .5 SOLUTION RESPIRATORY (INHALATION) at 10:03

## 2024-03-06 RX ADMIN — VANCOMYCIN HYDROCHLORIDE 1750 MG: 5 INJECTION, POWDER, LYOPHILIZED, FOR SOLUTION INTRAVENOUS at 09:04

## 2024-03-06 RX ADMIN — IPRATROPIUM BROMIDE AND ALBUTEROL SULFATE 1 DOSE: 2.5; .5 SOLUTION RESPIRATORY (INHALATION) at 13:06

## 2024-03-06 RX ADMIN — BUDESONIDE INHALATION 500 MCG: 0.5 SUSPENSION RESPIRATORY (INHALATION) at 10:03

## 2024-03-06 RX ADMIN — IPRATROPIUM BROMIDE AND ALBUTEROL SULFATE 1 DOSE: 2.5; .5 SOLUTION RESPIRATORY (INHALATION) at 23:53

## 2024-03-06 RX ADMIN — INSULIN LISPRO 5 UNITS: 100 INJECTION, SOLUTION INTRAVENOUS; SUBCUTANEOUS at 16:06

## 2024-03-06 RX ADMIN — METHYLPREDNISOLONE SODIUM SUCCINATE 40 MG: 40 INJECTION, POWDER, LYOPHILIZED, FOR SOLUTION INTRAMUSCULAR; INTRAVENOUS at 03:00

## 2024-03-06 RX ADMIN — FAMOTIDINE 20 MG: 20 TABLET ORAL at 09:03

## 2024-03-06 RX ADMIN — METHYLPREDNISOLONE SODIUM SUCCINATE 40 MG: 40 INJECTION, POWDER, LYOPHILIZED, FOR SOLUTION INTRAMUSCULAR; INTRAVENOUS at 20:47

## 2024-03-06 RX ADMIN — IPRATROPIUM BROMIDE AND ALBUTEROL SULFATE 1 DOSE: 2.5; .5 SOLUTION RESPIRATORY (INHALATION) at 17:56

## 2024-03-06 RX ADMIN — BUDESONIDE INHALATION 500 MCG: 0.5 SUSPENSION RESPIRATORY (INHALATION) at 19:38

## 2024-03-06 RX ADMIN — BUPRENORPHINE HYDROCHLORIDE AND NALOXONE HYDROCHLORIDE DIHYDRATE 1 TABLET: 8; 2 TABLET SUBLINGUAL at 09:03

## 2024-03-06 RX ADMIN — ACETAMINOPHEN 650 MG: 325 TABLET ORAL at 20:53

## 2024-03-06 RX ADMIN — PIPERACILLIN AND TAZOBACTAM 4500 MG: 4; .5 INJECTION, POWDER, FOR SOLUTION INTRAVENOUS at 14:52

## 2024-03-06 RX ADMIN — BUPRENORPHINE HYDROCHLORIDE AND NALOXONE HYDROCHLORIDE DIHYDRATE 1 TABLET: 8; 2 TABLET SUBLINGUAL at 20:39

## 2024-03-06 RX ADMIN — ENOXAPARIN SODIUM 30 MG: 100 INJECTION SUBCUTANEOUS at 20:38

## 2024-03-06 RX ADMIN — SODIUM CHLORIDE, PRESERVATIVE FREE 10 ML: 5 INJECTION INTRAVENOUS at 09:05

## 2024-03-06 RX ADMIN — BUMETANIDE 2 MG: 0.25 INJECTION INTRAMUSCULAR; INTRAVENOUS at 11:08

## 2024-03-06 RX ADMIN — VANCOMYCIN HYDROCHLORIDE 1750 MG: 5 INJECTION, POWDER, LYOPHILIZED, FOR SOLUTION INTRAVENOUS at 21:10

## 2024-03-06 RX ADMIN — GABAPENTIN 600 MG: 300 CAPSULE ORAL at 14:51

## 2024-03-06 RX ADMIN — ARFORMOTEROL TARTRATE 15 MCG: 15 SOLUTION RESPIRATORY (INHALATION) at 19:37

## 2024-03-06 RX ADMIN — PIPERACILLIN AND TAZOBACTAM 4500 MG: 4; .5 INJECTION, POWDER, FOR SOLUTION INTRAVENOUS at 21:02

## 2024-03-06 RX ADMIN — ENOXAPARIN SODIUM 30 MG: 100 INJECTION SUBCUTANEOUS at 09:03

## 2024-03-06 RX ADMIN — IPRATROPIUM BROMIDE AND ALBUTEROL SULFATE 1 DOSE: 2.5; .5 SOLUTION RESPIRATORY (INHALATION) at 03:36

## 2024-03-06 RX ADMIN — INSULIN GLARGINE 10 UNITS: 100 INJECTION, SOLUTION SUBCUTANEOUS at 20:37

## 2024-03-06 RX ADMIN — PIPERACILLIN AND TAZOBACTAM 4500 MG: 4; .5 INJECTION, POWDER, FOR SOLUTION INTRAVENOUS at 06:12

## 2024-03-06 RX ADMIN — BUMETANIDE 0.5 MG/HR: 0.25 INJECTION INTRAMUSCULAR; INTRAVENOUS at 11:09

## 2024-03-06 RX ADMIN — SODIUM CHLORIDE, PRESERVATIVE FREE 10 ML: 5 INJECTION INTRAVENOUS at 20:46

## 2024-03-06 RX ADMIN — METHYLPREDNISOLONE SODIUM SUCCINATE 40 MG: 40 INJECTION, POWDER, LYOPHILIZED, FOR SOLUTION INTRAMUSCULAR; INTRAVENOUS at 11:10

## 2024-03-06 RX ADMIN — SODIUM CHLORIDE, PRESERVATIVE FREE 10 ML: 5 INJECTION INTRAVENOUS at 20:47

## 2024-03-06 RX ADMIN — IPRATROPIUM BROMIDE AND ALBUTEROL SULFATE 1 DOSE: 2.5; .5 SOLUTION RESPIRATORY (INHALATION) at 19:37

## 2024-03-06 RX ADMIN — GABAPENTIN 600 MG: 300 CAPSULE ORAL at 09:03

## 2024-03-06 RX ADMIN — INSULIN LISPRO 2 UNITS: 100 INJECTION, SOLUTION INTRAVENOUS; SUBCUTANEOUS at 16:05

## 2024-03-06 NOTE — CARE COORDINATION
Social work / Discharge planning:           Patient is currently requiring 15 liters high flow oxygen, Bumex drip, IV Vancomycin and Solumedrol.    He does not have home oxygen.   Nephrology and pulmonology are following.    Electronically signed by MORELIA Quiles on 3/6/2024 at 10:48 AM

## 2024-03-06 NOTE — FLOWSHEET NOTE
Several times throughout day patient has been counseled on 02 sat and need for bi-pap placement continues to refuse

## 2024-03-07 ENCOUNTER — APPOINTMENT (OUTPATIENT)
Dept: GENERAL RADIOLOGY | Age: 54
DRG: 871 | End: 2024-03-07
Payer: COMMERCIAL

## 2024-03-07 LAB
ANION GAP SERPL CALCULATED.3IONS-SCNC: 10 MMOL/L (ref 7–16)
ANION GAP SERPL CALCULATED.3IONS-SCNC: 11 MMOL/L (ref 7–16)
BUN SERPL-MCNC: 24 MG/DL (ref 6–20)
BUN SERPL-MCNC: 27 MG/DL (ref 6–20)
CALCIUM SERPL-MCNC: 7.7 MG/DL (ref 8.6–10.2)
CALCIUM SERPL-MCNC: 8.9 MG/DL (ref 8.6–10.2)
CHLORIDE SERPL-SCNC: 89 MMOL/L (ref 98–107)
CHLORIDE SERPL-SCNC: 96 MMOL/L (ref 98–107)
CO2 SERPL-SCNC: 33 MMOL/L (ref 22–29)
CO2 SERPL-SCNC: 38 MMOL/L (ref 22–29)
CREAT SERPL-MCNC: 0.7 MG/DL (ref 0.7–1.2)
CREAT SERPL-MCNC: 0.9 MG/DL (ref 0.7–1.2)
GFR SERPL CREATININE-BSD FRML MDRD: >60 ML/MIN/1.73M2
GFR SERPL CREATININE-BSD FRML MDRD: >60 ML/MIN/1.73M2
GLUCOSE BLD-MCNC: 161 MG/DL (ref 74–99)
GLUCOSE BLD-MCNC: 221 MG/DL (ref 74–99)
GLUCOSE BLD-MCNC: 225 MG/DL (ref 74–99)
GLUCOSE BLD-MCNC: 337 MG/DL (ref 74–99)
GLUCOSE SERPL-MCNC: 207 MG/DL (ref 74–99)
GLUCOSE SERPL-MCNC: 276 MG/DL (ref 74–99)
LACTATE BLDV-SCNC: 1.8 MMOL/L (ref 0.5–2.2)
POTASSIUM SERPL-SCNC: 3.6 MMOL/L (ref 3.5–5)
POTASSIUM SERPL-SCNC: 4.7 MMOL/L (ref 3.5–5)
SODIUM SERPL-SCNC: 138 MMOL/L (ref 132–146)
SODIUM SERPL-SCNC: 139 MMOL/L (ref 132–146)
VANCOMYCIN SERPL-MCNC: 14.6 UG/ML (ref 5–40)

## 2024-03-07 PROCEDURE — 94660 CPAP INITIATION&MGMT: CPT

## 2024-03-07 PROCEDURE — 2580000003 HC RX 258

## 2024-03-07 PROCEDURE — 99232 SBSQ HOSP IP/OBS MODERATE 35: CPT | Performed by: STUDENT IN AN ORGANIZED HEALTH CARE EDUCATION/TRAINING PROGRAM

## 2024-03-07 PROCEDURE — 6360000002 HC RX W HCPCS: Performed by: STUDENT IN AN ORGANIZED HEALTH CARE EDUCATION/TRAINING PROGRAM

## 2024-03-07 PROCEDURE — 2700000000 HC OXYGEN THERAPY PER DAY

## 2024-03-07 PROCEDURE — 94669 MECHANICAL CHEST WALL OSCILL: CPT

## 2024-03-07 PROCEDURE — 6370000000 HC RX 637 (ALT 250 FOR IP): Performed by: STUDENT IN AN ORGANIZED HEALTH CARE EDUCATION/TRAINING PROGRAM

## 2024-03-07 PROCEDURE — 71045 X-RAY EXAM CHEST 1 VIEW: CPT

## 2024-03-07 PROCEDURE — 6370000000 HC RX 637 (ALT 250 FOR IP): Performed by: NURSE PRACTITIONER

## 2024-03-07 PROCEDURE — 2580000003 HC RX 258: Performed by: STUDENT IN AN ORGANIZED HEALTH CARE EDUCATION/TRAINING PROGRAM

## 2024-03-07 PROCEDURE — 80048 BASIC METABOLIC PNL TOTAL CA: CPT

## 2024-03-07 PROCEDURE — 94640 AIRWAY INHALATION TREATMENT: CPT

## 2024-03-07 PROCEDURE — 2060000000 HC ICU INTERMEDIATE R&B

## 2024-03-07 PROCEDURE — 6360000002 HC RX W HCPCS: Performed by: INTERNAL MEDICINE

## 2024-03-07 PROCEDURE — 80202 ASSAY OF VANCOMYCIN: CPT

## 2024-03-07 PROCEDURE — 82962 GLUCOSE BLOOD TEST: CPT

## 2024-03-07 PROCEDURE — 6360000002 HC RX W HCPCS

## 2024-03-07 PROCEDURE — 36415 COLL VENOUS BLD VENIPUNCTURE: CPT

## 2024-03-07 RX ORDER — METHYLPREDNISOLONE SODIUM SUCCINATE 40 MG/ML
40 INJECTION, POWDER, LYOPHILIZED, FOR SOLUTION INTRAMUSCULAR; INTRAVENOUS EVERY 8 HOURS
Status: DISCONTINUED | OUTPATIENT
Start: 2024-03-07 | End: 2024-03-08

## 2024-03-07 RX ADMIN — VANCOMYCIN HYDROCHLORIDE 1750 MG: 5 INJECTION, POWDER, LYOPHILIZED, FOR SOLUTION INTRAVENOUS at 20:07

## 2024-03-07 RX ADMIN — VANCOMYCIN HYDROCHLORIDE 1750 MG: 5 INJECTION, POWDER, LYOPHILIZED, FOR SOLUTION INTRAVENOUS at 10:36

## 2024-03-07 RX ADMIN — BUDESONIDE INHALATION 500 MCG: 0.5 SUSPENSION RESPIRATORY (INHALATION) at 07:47

## 2024-03-07 RX ADMIN — INSULIN LISPRO 2 UNITS: 100 INJECTION, SOLUTION INTRAVENOUS; SUBCUTANEOUS at 06:27

## 2024-03-07 RX ADMIN — ACETAMINOPHEN 650 MG: 325 TABLET ORAL at 15:55

## 2024-03-07 RX ADMIN — PIPERACILLIN AND TAZOBACTAM 4500 MG: 4; .5 INJECTION, POWDER, FOR SOLUTION INTRAVENOUS at 22:13

## 2024-03-07 RX ADMIN — IPRATROPIUM BROMIDE AND ALBUTEROL SULFATE 1 DOSE: 2.5; .5 SOLUTION RESPIRATORY (INHALATION) at 07:47

## 2024-03-07 RX ADMIN — GABAPENTIN 600 MG: 300 CAPSULE ORAL at 09:58

## 2024-03-07 RX ADMIN — INSULIN LISPRO 5 UNITS: 100 INJECTION, SOLUTION INTRAVENOUS; SUBCUTANEOUS at 11:58

## 2024-03-07 RX ADMIN — INSULIN GLARGINE 10 UNITS: 100 INJECTION, SOLUTION SUBCUTANEOUS at 20:06

## 2024-03-07 RX ADMIN — IPRATROPIUM BROMIDE AND ALBUTEROL SULFATE 1 DOSE: 2.5; .5 SOLUTION RESPIRATORY (INHALATION) at 12:10

## 2024-03-07 RX ADMIN — INSULIN LISPRO 4 UNITS: 100 INJECTION, SOLUTION INTRAVENOUS; SUBCUTANEOUS at 19:59

## 2024-03-07 RX ADMIN — FAMOTIDINE 20 MG: 20 TABLET ORAL at 09:58

## 2024-03-07 RX ADMIN — SODIUM CHLORIDE, PRESERVATIVE FREE 10 ML: 5 INJECTION INTRAVENOUS at 12:15

## 2024-03-07 RX ADMIN — PIPERACILLIN AND TAZOBACTAM 4500 MG: 4; .5 INJECTION, POWDER, FOR SOLUTION INTRAVENOUS at 05:34

## 2024-03-07 RX ADMIN — BUPRENORPHINE HYDROCHLORIDE AND NALOXONE HYDROCHLORIDE DIHYDRATE 1 TABLET: 8; 2 TABLET SUBLINGUAL at 19:59

## 2024-03-07 RX ADMIN — METHYLPREDNISOLONE SODIUM SUCCINATE 40 MG: 40 INJECTION, POWDER, LYOPHILIZED, FOR SOLUTION INTRAMUSCULAR; INTRAVENOUS at 10:00

## 2024-03-07 RX ADMIN — INSULIN LISPRO 5 UNITS: 100 INJECTION, SOLUTION INTRAVENOUS; SUBCUTANEOUS at 16:40

## 2024-03-07 RX ADMIN — GABAPENTIN 600 MG: 300 CAPSULE ORAL at 15:54

## 2024-03-07 RX ADMIN — METHYLPREDNISOLONE SODIUM SUCCINATE 40 MG: 40 INJECTION, POWDER, LYOPHILIZED, FOR SOLUTION INTRAMUSCULAR; INTRAVENOUS at 02:15

## 2024-03-07 RX ADMIN — METHYLPREDNISOLONE SODIUM SUCCINATE 40 MG: 40 INJECTION, POWDER, LYOPHILIZED, FOR SOLUTION INTRAMUSCULAR; INTRAVENOUS at 15:59

## 2024-03-07 RX ADMIN — IPRATROPIUM BROMIDE AND ALBUTEROL SULFATE 1 DOSE: 2.5; .5 SOLUTION RESPIRATORY (INHALATION) at 03:33

## 2024-03-07 RX ADMIN — INSULIN LISPRO 2 UNITS: 100 INJECTION, SOLUTION INTRAVENOUS; SUBCUTANEOUS at 11:57

## 2024-03-07 RX ADMIN — ARFORMOTEROL TARTRATE 15 MCG: 15 SOLUTION RESPIRATORY (INHALATION) at 07:47

## 2024-03-07 RX ADMIN — SODIUM CHLORIDE, PRESERVATIVE FREE 10 ML: 5 INJECTION INTRAVENOUS at 19:59

## 2024-03-07 RX ADMIN — BUDESONIDE INHALATION 500 MCG: 0.5 SUSPENSION RESPIRATORY (INHALATION) at 21:27

## 2024-03-07 RX ADMIN — BUPRENORPHINE HYDROCHLORIDE AND NALOXONE HYDROCHLORIDE DIHYDRATE 1 TABLET: 8; 2 TABLET SUBLINGUAL at 09:59

## 2024-03-07 RX ADMIN — INSULIN LISPRO 5 UNITS: 100 INJECTION, SOLUTION INTRAVENOUS; SUBCUTANEOUS at 06:27

## 2024-03-07 RX ADMIN — IPRATROPIUM BROMIDE AND ALBUTEROL SULFATE 1 DOSE: 2.5; .5 SOLUTION RESPIRATORY (INHALATION) at 15:48

## 2024-03-07 RX ADMIN — PIPERACILLIN AND TAZOBACTAM 4500 MG: 4; .5 INJECTION, POWDER, FOR SOLUTION INTRAVENOUS at 15:53

## 2024-03-07 RX ADMIN — METHYLPREDNISOLONE SODIUM SUCCINATE 40 MG: 40 INJECTION INTRAMUSCULAR; INTRAVENOUS at 22:13

## 2024-03-07 RX ADMIN — ARFORMOTEROL TARTRATE 15 MCG: 15 SOLUTION RESPIRATORY (INHALATION) at 21:27

## 2024-03-07 RX ADMIN — IPRATROPIUM BROMIDE AND ALBUTEROL SULFATE 1 DOSE: 2.5; .5 SOLUTION RESPIRATORY (INHALATION) at 21:27

## 2024-03-07 RX ADMIN — ENOXAPARIN SODIUM 30 MG: 100 INJECTION SUBCUTANEOUS at 09:59

## 2024-03-07 RX ADMIN — SODIUM CHLORIDE, PRESERVATIVE FREE 10 ML: 5 INJECTION INTRAVENOUS at 20:00

## 2024-03-07 RX ADMIN — GABAPENTIN 600 MG: 300 CAPSULE ORAL at 19:59

## 2024-03-07 RX ADMIN — ENOXAPARIN SODIUM 30 MG: 100 INJECTION SUBCUTANEOUS at 19:58

## 2024-03-07 RX ADMIN — SODIUM CHLORIDE, PRESERVATIVE FREE 10 ML: 5 INJECTION INTRAVENOUS at 12:14

## 2024-03-07 ASSESSMENT — PAIN DESCRIPTION - DESCRIPTORS: DESCRIPTORS: ACHING

## 2024-03-07 ASSESSMENT — PAIN DESCRIPTION - LOCATION: LOCATION: HEAD

## 2024-03-07 ASSESSMENT — PAIN - FUNCTIONAL ASSESSMENT: PAIN_FUNCTIONAL_ASSESSMENT: ACTIVITIES ARE NOT PREVENTED

## 2024-03-08 ENCOUNTER — APPOINTMENT (OUTPATIENT)
Dept: GENERAL RADIOLOGY | Age: 54
DRG: 871 | End: 2024-03-08
Payer: COMMERCIAL

## 2024-03-08 LAB
ANION GAP SERPL CALCULATED.3IONS-SCNC: 13 MMOL/L (ref 7–16)
BUN SERPL-MCNC: 25 MG/DL (ref 6–20)
CALCIUM SERPL-MCNC: 9.2 MG/DL (ref 8.6–10.2)
CHLORIDE SERPL-SCNC: 89 MMOL/L (ref 98–107)
CO2 SERPL-SCNC: 35 MMOL/L (ref 22–29)
CREAT SERPL-MCNC: 0.7 MG/DL (ref 0.7–1.2)
GFR SERPL CREATININE-BSD FRML MDRD: >60 ML/MIN/1.73M2
GLUCOSE BLD-MCNC: 204 MG/DL (ref 74–99)
GLUCOSE BLD-MCNC: 221 MG/DL (ref 74–99)
GLUCOSE BLD-MCNC: 240 MG/DL (ref 74–99)
GLUCOSE BLD-MCNC: 270 MG/DL (ref 74–99)
GLUCOSE SERPL-MCNC: 220 MG/DL (ref 74–99)
MICROORGANISM SPEC CULT: NORMAL
POTASSIUM SERPL-SCNC: 4.3 MMOL/L (ref 3.5–5)
SODIUM SERPL-SCNC: 137 MMOL/L (ref 132–146)
SPECIMEN DESCRIPTION: NORMAL

## 2024-03-08 PROCEDURE — 6360000002 HC RX W HCPCS: Performed by: INTERNAL MEDICINE

## 2024-03-08 PROCEDURE — 2060000000 HC ICU INTERMEDIATE R&B

## 2024-03-08 PROCEDURE — 2580000003 HC RX 258: Performed by: INTERNAL MEDICINE

## 2024-03-08 PROCEDURE — 6370000000 HC RX 637 (ALT 250 FOR IP): Performed by: INTERNAL MEDICINE

## 2024-03-08 PROCEDURE — 99232 SBSQ HOSP IP/OBS MODERATE 35: CPT | Performed by: STUDENT IN AN ORGANIZED HEALTH CARE EDUCATION/TRAINING PROGRAM

## 2024-03-08 PROCEDURE — 94640 AIRWAY INHALATION TREATMENT: CPT

## 2024-03-08 PROCEDURE — 82962 GLUCOSE BLOOD TEST: CPT

## 2024-03-08 PROCEDURE — 94660 CPAP INITIATION&MGMT: CPT

## 2024-03-08 PROCEDURE — 6360000002 HC RX W HCPCS: Performed by: STUDENT IN AN ORGANIZED HEALTH CARE EDUCATION/TRAINING PROGRAM

## 2024-03-08 PROCEDURE — 6370000000 HC RX 637 (ALT 250 FOR IP): Performed by: NURSE PRACTITIONER

## 2024-03-08 PROCEDURE — 2580000003 HC RX 258: Performed by: STUDENT IN AN ORGANIZED HEALTH CARE EDUCATION/TRAINING PROGRAM

## 2024-03-08 PROCEDURE — 6370000000 HC RX 637 (ALT 250 FOR IP): Performed by: STUDENT IN AN ORGANIZED HEALTH CARE EDUCATION/TRAINING PROGRAM

## 2024-03-08 PROCEDURE — 80048 BASIC METABOLIC PNL TOTAL CA: CPT

## 2024-03-08 PROCEDURE — 6360000002 HC RX W HCPCS

## 2024-03-08 PROCEDURE — 2700000000 HC OXYGEN THERAPY PER DAY

## 2024-03-08 PROCEDURE — 71045 X-RAY EXAM CHEST 1 VIEW: CPT

## 2024-03-08 PROCEDURE — 2580000003 HC RX 258

## 2024-03-08 RX ORDER — METHYLPREDNISOLONE SODIUM SUCCINATE 40 MG/ML
40 INJECTION, POWDER, LYOPHILIZED, FOR SOLUTION INTRAMUSCULAR; INTRAVENOUS EVERY 12 HOURS
Status: DISCONTINUED | OUTPATIENT
Start: 2024-03-08 | End: 2024-03-09

## 2024-03-08 RX ADMIN — INSULIN LISPRO 5 UNITS: 100 INJECTION, SOLUTION INTRAVENOUS; SUBCUTANEOUS at 16:06

## 2024-03-08 RX ADMIN — INSULIN LISPRO 4 UNITS: 100 INJECTION, SOLUTION INTRAVENOUS; SUBCUTANEOUS at 16:05

## 2024-03-08 RX ADMIN — INSULIN GLARGINE 10 UNITS: 100 INJECTION, SOLUTION SUBCUTANEOUS at 20:26

## 2024-03-08 RX ADMIN — ARFORMOTEROL TARTRATE 15 MCG: 15 SOLUTION RESPIRATORY (INHALATION) at 20:22

## 2024-03-08 RX ADMIN — METHYLPREDNISOLONE SODIUM SUCCINATE 40 MG: 40 INJECTION INTRAMUSCULAR; INTRAVENOUS at 23:22

## 2024-03-08 RX ADMIN — INSULIN LISPRO 2 UNITS: 100 INJECTION, SOLUTION INTRAVENOUS; SUBCUTANEOUS at 10:54

## 2024-03-08 RX ADMIN — BUPRENORPHINE HYDROCHLORIDE AND NALOXONE HYDROCHLORIDE DIHYDRATE 1 TABLET: 8; 2 TABLET SUBLINGUAL at 20:26

## 2024-03-08 RX ADMIN — ARFORMOTEROL TARTRATE 15 MCG: 15 SOLUTION RESPIRATORY (INHALATION) at 08:19

## 2024-03-08 RX ADMIN — BUDESONIDE INHALATION 500 MCG: 0.5 SUSPENSION RESPIRATORY (INHALATION) at 20:22

## 2024-03-08 RX ADMIN — SODIUM CHLORIDE, PRESERVATIVE FREE 10 ML: 5 INJECTION INTRAVENOUS at 10:48

## 2024-03-08 RX ADMIN — SODIUM CHLORIDE, PRESERVATIVE FREE 10 ML: 5 INJECTION INTRAVENOUS at 20:27

## 2024-03-08 RX ADMIN — IPRATROPIUM BROMIDE AND ALBUTEROL SULFATE 1 DOSE: 2.5; .5 SOLUTION RESPIRATORY (INHALATION) at 00:14

## 2024-03-08 RX ADMIN — BUMETANIDE 0.5 MG/HR: 0.25 INJECTION INTRAMUSCULAR; INTRAVENOUS at 10:35

## 2024-03-08 RX ADMIN — IPRATROPIUM BROMIDE AND ALBUTEROL SULFATE 1 DOSE: 2.5; .5 SOLUTION RESPIRATORY (INHALATION) at 08:17

## 2024-03-08 RX ADMIN — PIPERACILLIN AND TAZOBACTAM 4500 MG: 4; .5 INJECTION, POWDER, FOR SOLUTION INTRAVENOUS at 15:58

## 2024-03-08 RX ADMIN — IPRATROPIUM BROMIDE AND ALBUTEROL SULFATE 1 DOSE: 2.5; .5 SOLUTION RESPIRATORY (INHALATION) at 03:39

## 2024-03-08 RX ADMIN — FAMOTIDINE 20 MG: 20 TABLET ORAL at 10:36

## 2024-03-08 RX ADMIN — INSULIN LISPRO 5 UNITS: 100 INJECTION, SOLUTION INTRAVENOUS; SUBCUTANEOUS at 10:55

## 2024-03-08 RX ADMIN — IPRATROPIUM BROMIDE AND ALBUTEROL SULFATE 1 DOSE: 2.5; .5 SOLUTION RESPIRATORY (INHALATION) at 12:06

## 2024-03-08 RX ADMIN — INSULIN LISPRO 2 UNITS: 100 INJECTION, SOLUTION INTRAVENOUS; SUBCUTANEOUS at 06:46

## 2024-03-08 RX ADMIN — GABAPENTIN 600 MG: 300 CAPSULE ORAL at 15:55

## 2024-03-08 RX ADMIN — GABAPENTIN 600 MG: 300 CAPSULE ORAL at 20:26

## 2024-03-08 RX ADMIN — INSULIN LISPRO 5 UNITS: 100 INJECTION, SOLUTION INTRAVENOUS; SUBCUTANEOUS at 06:46

## 2024-03-08 RX ADMIN — PIPERACILLIN AND TAZOBACTAM 4500 MG: 4; .5 INJECTION, POWDER, FOR SOLUTION INTRAVENOUS at 23:22

## 2024-03-08 RX ADMIN — PIPERACILLIN AND TAZOBACTAM 4500 MG: 4; .5 INJECTION, POWDER, FOR SOLUTION INTRAVENOUS at 05:54

## 2024-03-08 RX ADMIN — ENOXAPARIN SODIUM 30 MG: 100 INJECTION SUBCUTANEOUS at 20:27

## 2024-03-08 RX ADMIN — IPRATROPIUM BROMIDE AND ALBUTEROL SULFATE 1 DOSE: 2.5; .5 SOLUTION RESPIRATORY (INHALATION) at 20:22

## 2024-03-08 RX ADMIN — BUPRENORPHINE HYDROCHLORIDE AND NALOXONE HYDROCHLORIDE DIHYDRATE 1 TABLET: 8; 2 TABLET SUBLINGUAL at 10:36

## 2024-03-08 RX ADMIN — METHYLPREDNISOLONE SODIUM SUCCINATE 40 MG: 40 INJECTION INTRAMUSCULAR; INTRAVENOUS at 10:37

## 2024-03-08 RX ADMIN — BUDESONIDE INHALATION 500 MCG: 0.5 SUSPENSION RESPIRATORY (INHALATION) at 08:18

## 2024-03-08 RX ADMIN — IPRATROPIUM BROMIDE AND ALBUTEROL SULFATE 1 DOSE: 2.5; .5 SOLUTION RESPIRATORY (INHALATION) at 16:03

## 2024-03-08 RX ADMIN — GABAPENTIN 600 MG: 300 CAPSULE ORAL at 10:36

## 2024-03-08 RX ADMIN — GUAIFENESIN 400 MG: 400 TABLET ORAL at 10:36

## 2024-03-08 RX ADMIN — SODIUM CHLORIDE, PRESERVATIVE FREE 10 ML: 5 INJECTION INTRAVENOUS at 10:49

## 2024-03-08 ASSESSMENT — PAIN SCALES - GENERAL: PAINLEVEL_OUTOF10: 0

## 2024-03-08 NOTE — CARE COORDINATION
Social work / Discharge planning:          Patient is independent from home.   He remains on 15 liters high flow oxygen and Bumex drip.     Will need pulse ox testing when discharge is anticipated.    Electronically signed by MORELIA Quiles on 3/8/2024 at 9:18 AM

## 2024-03-09 LAB
ANION GAP SERPL CALCULATED.3IONS-SCNC: 8 MMOL/L (ref 7–16)
B.E.: 14.1 MMOL/L (ref -3–3)
BUN SERPL-MCNC: 22 MG/DL (ref 6–20)
CALCIUM SERPL-MCNC: 9 MG/DL (ref 8.6–10.2)
CHLORIDE SERPL-SCNC: 85 MMOL/L (ref 98–107)
CO2 SERPL-SCNC: 41 MMOL/L (ref 22–29)
COHB: 1.1 % (ref 0–1.5)
CREAT SERPL-MCNC: 0.9 MG/DL (ref 0.7–1.2)
CRITICAL: ABNORMAL
DATE ANALYZED: ABNORMAL
DATE OF COLLECTION: ABNORMAL
GFR SERPL CREATININE-BSD FRML MDRD: >60 ML/MIN/1.73M2
GLUCOSE BLD-MCNC: 193 MG/DL (ref 74–99)
GLUCOSE BLD-MCNC: 205 MG/DL (ref 74–99)
GLUCOSE BLD-MCNC: 223 MG/DL (ref 74–99)
GLUCOSE BLD-MCNC: 289 MG/DL (ref 74–99)
GLUCOSE SERPL-MCNC: 227 MG/DL (ref 74–99)
HCO3: 42.3 MMOL/L (ref 22–26)
HHB: 6.8 % (ref 0–5)
LAB: ABNORMAL
Lab: 1145
METHB: 0.3 % (ref 0–1.5)
MICROORGANISM SPEC CULT: NORMAL
MICROORGANISM SPEC CULT: NORMAL
MODE: ABNORMAL
O2 CONTENT: 20 ML/DL
O2 SATURATION: 93.1 % (ref 92–98.5)
O2HB: 91.8 % (ref 94–97)
OPERATOR ID: 913
PATIENT TEMP: 37 C
PCO2: 67.1 MMHG (ref 35–45)
PH BLOOD GAS: 7.42 (ref 7.35–7.45)
PO2: 66.5 MMHG (ref 75–100)
POTASSIUM SERPL-SCNC: 4.2 MMOL/L (ref 3.5–5)
SERVICE CMNT-IMP: NORMAL
SERVICE CMNT-IMP: NORMAL
SODIUM SERPL-SCNC: 134 MMOL/L (ref 132–146)
SOURCE, BLOOD GAS: ABNORMAL
SPECIMEN DESCRIPTION: NORMAL
SPECIMEN DESCRIPTION: NORMAL
THB: 15.5 G/DL (ref 11.5–16.5)
TIME ANALYZED: 1154

## 2024-03-09 PROCEDURE — 6370000000 HC RX 637 (ALT 250 FOR IP): Performed by: NURSE PRACTITIONER

## 2024-03-09 PROCEDURE — 2580000003 HC RX 258: Performed by: INTERNAL MEDICINE

## 2024-03-09 PROCEDURE — 82962 GLUCOSE BLOOD TEST: CPT

## 2024-03-09 PROCEDURE — 80048 BASIC METABOLIC PNL TOTAL CA: CPT

## 2024-03-09 PROCEDURE — 2060000000 HC ICU INTERMEDIATE R&B

## 2024-03-09 PROCEDURE — 94660 CPAP INITIATION&MGMT: CPT

## 2024-03-09 PROCEDURE — 82805 BLOOD GASES W/O2 SATURATION: CPT

## 2024-03-09 PROCEDURE — 6370000000 HC RX 637 (ALT 250 FOR IP): Performed by: STUDENT IN AN ORGANIZED HEALTH CARE EDUCATION/TRAINING PROGRAM

## 2024-03-09 PROCEDURE — 6360000002 HC RX W HCPCS: Performed by: STUDENT IN AN ORGANIZED HEALTH CARE EDUCATION/TRAINING PROGRAM

## 2024-03-09 PROCEDURE — 6360000002 HC RX W HCPCS: Performed by: INTERNAL MEDICINE

## 2024-03-09 PROCEDURE — 99232 SBSQ HOSP IP/OBS MODERATE 35: CPT | Performed by: STUDENT IN AN ORGANIZED HEALTH CARE EDUCATION/TRAINING PROGRAM

## 2024-03-09 PROCEDURE — 94640 AIRWAY INHALATION TREATMENT: CPT

## 2024-03-09 PROCEDURE — 2700000000 HC OXYGEN THERAPY PER DAY

## 2024-03-09 PROCEDURE — 2580000003 HC RX 258

## 2024-03-09 PROCEDURE — 6370000000 HC RX 637 (ALT 250 FOR IP)

## 2024-03-09 PROCEDURE — 2580000003 HC RX 258: Performed by: STUDENT IN AN ORGANIZED HEALTH CARE EDUCATION/TRAINING PROGRAM

## 2024-03-09 RX ORDER — METHYLPREDNISOLONE SODIUM SUCCINATE 40 MG/ML
40 INJECTION, POWDER, LYOPHILIZED, FOR SOLUTION INTRAMUSCULAR; INTRAVENOUS DAILY
Status: DISCONTINUED | OUTPATIENT
Start: 2024-03-10 | End: 2024-03-10

## 2024-03-09 RX ORDER — ACETAZOLAMIDE 250 MG/1
250 TABLET ORAL DAILY
Status: DISCONTINUED | OUTPATIENT
Start: 2024-03-09 | End: 2024-03-10

## 2024-03-09 RX ADMIN — INSULIN GLARGINE 10 UNITS: 100 INJECTION, SOLUTION SUBCUTANEOUS at 22:16

## 2024-03-09 RX ADMIN — PIPERACILLIN AND TAZOBACTAM 4500 MG: 4; .5 INJECTION, POWDER, FOR SOLUTION INTRAVENOUS at 22:28

## 2024-03-09 RX ADMIN — ACETAZOLAMIDE 250 MG: 250 TABLET ORAL at 15:26

## 2024-03-09 RX ADMIN — BUDESONIDE INHALATION 500 MCG: 0.5 SUSPENSION RESPIRATORY (INHALATION) at 19:52

## 2024-03-09 RX ADMIN — IPRATROPIUM BROMIDE AND ALBUTEROL SULFATE 1 DOSE: 2.5; .5 SOLUTION RESPIRATORY (INHALATION) at 08:02

## 2024-03-09 RX ADMIN — ACETAMINOPHEN 650 MG: 325 TABLET ORAL at 22:11

## 2024-03-09 RX ADMIN — BUDESONIDE INHALATION 500 MCG: 0.5 SUSPENSION RESPIRATORY (INHALATION) at 08:02

## 2024-03-09 RX ADMIN — BUPRENORPHINE HYDROCHLORIDE AND NALOXONE HYDROCHLORIDE DIHYDRATE 1 TABLET: 8; 2 TABLET SUBLINGUAL at 22:11

## 2024-03-09 RX ADMIN — GABAPENTIN 600 MG: 300 CAPSULE ORAL at 08:08

## 2024-03-09 RX ADMIN — INSULIN LISPRO 5 UNITS: 100 INJECTION, SOLUTION INTRAVENOUS; SUBCUTANEOUS at 16:39

## 2024-03-09 RX ADMIN — SODIUM CHLORIDE, PRESERVATIVE FREE 10 ML: 5 INJECTION INTRAVENOUS at 10:21

## 2024-03-09 RX ADMIN — IPRATROPIUM BROMIDE AND ALBUTEROL SULFATE 1 DOSE: 2.5; .5 SOLUTION RESPIRATORY (INHALATION) at 23:14

## 2024-03-09 RX ADMIN — ENOXAPARIN SODIUM 30 MG: 100 INJECTION SUBCUTANEOUS at 22:12

## 2024-03-09 RX ADMIN — INSULIN LISPRO 2 UNITS: 100 INJECTION, SOLUTION INTRAVENOUS; SUBCUTANEOUS at 11:18

## 2024-03-09 RX ADMIN — BUPRENORPHINE HYDROCHLORIDE AND NALOXONE HYDROCHLORIDE DIHYDRATE 1 TABLET: 8; 2 TABLET SUBLINGUAL at 08:08

## 2024-03-09 RX ADMIN — ARFORMOTEROL TARTRATE 15 MCG: 15 SOLUTION RESPIRATORY (INHALATION) at 08:02

## 2024-03-09 RX ADMIN — IPRATROPIUM BROMIDE AND ALBUTEROL SULFATE 1 DOSE: 2.5; .5 SOLUTION RESPIRATORY (INHALATION) at 00:51

## 2024-03-09 RX ADMIN — INSULIN LISPRO 5 UNITS: 100 INJECTION, SOLUTION INTRAVENOUS; SUBCUTANEOUS at 11:18

## 2024-03-09 RX ADMIN — METHYLPREDNISOLONE SODIUM SUCCINATE 40 MG: 40 INJECTION INTRAMUSCULAR; INTRAVENOUS at 10:18

## 2024-03-09 RX ADMIN — FAMOTIDINE 20 MG: 20 TABLET ORAL at 08:08

## 2024-03-09 RX ADMIN — BUMETANIDE 0.5 MG/HR: 0.25 INJECTION INTRAMUSCULAR; INTRAVENOUS at 12:30

## 2024-03-09 RX ADMIN — ARFORMOTEROL TARTRATE 15 MCG: 15 SOLUTION RESPIRATORY (INHALATION) at 19:52

## 2024-03-09 RX ADMIN — IPRATROPIUM BROMIDE AND ALBUTEROL SULFATE 1 DOSE: 2.5; .5 SOLUTION RESPIRATORY (INHALATION) at 15:59

## 2024-03-09 RX ADMIN — GABAPENTIN 600 MG: 300 CAPSULE ORAL at 22:11

## 2024-03-09 RX ADMIN — PIPERACILLIN AND TAZOBACTAM 4500 MG: 4; .5 INJECTION, POWDER, FOR SOLUTION INTRAVENOUS at 06:46

## 2024-03-09 RX ADMIN — INSULIN LISPRO 5 UNITS: 100 INJECTION, SOLUTION INTRAVENOUS; SUBCUTANEOUS at 06:45

## 2024-03-09 RX ADMIN — IPRATROPIUM BROMIDE AND ALBUTEROL SULFATE 1 DOSE: 2.5; .5 SOLUTION RESPIRATORY (INHALATION) at 19:52

## 2024-03-09 RX ADMIN — INSULIN LISPRO 2 UNITS: 100 INJECTION, SOLUTION INTRAVENOUS; SUBCUTANEOUS at 06:45

## 2024-03-09 RX ADMIN — PIPERACILLIN AND TAZOBACTAM 4500 MG: 4; .5 INJECTION, POWDER, FOR SOLUTION INTRAVENOUS at 13:59

## 2024-03-09 RX ADMIN — IPRATROPIUM BROMIDE AND ALBUTEROL SULFATE 1 DOSE: 2.5; .5 SOLUTION RESPIRATORY (INHALATION) at 12:37

## 2024-03-09 RX ADMIN — ENOXAPARIN SODIUM 30 MG: 100 INJECTION SUBCUTANEOUS at 08:08

## 2024-03-09 RX ADMIN — SODIUM CHLORIDE, PRESERVATIVE FREE 10 ML: 5 INJECTION INTRAVENOUS at 08:09

## 2024-03-09 RX ADMIN — GABAPENTIN 600 MG: 300 CAPSULE ORAL at 13:58

## 2024-03-09 ASSESSMENT — PAIN SCALES - GENERAL
PAINLEVEL_OUTOF10: 0
PAINLEVEL_OUTOF10: 5

## 2024-03-09 ASSESSMENT — PAIN DESCRIPTION - ORIENTATION: ORIENTATION: RIGHT;LEFT

## 2024-03-09 ASSESSMENT — PAIN SCALES - WONG BAKER: WONGBAKER_NUMERICALRESPONSE: 0

## 2024-03-09 ASSESSMENT — PAIN DESCRIPTION - LOCATION: LOCATION: HEAD

## 2024-03-09 ASSESSMENT — PAIN DESCRIPTION - FREQUENCY: FREQUENCY: INTERMITTENT

## 2024-03-09 ASSESSMENT — PAIN DESCRIPTION - PAIN TYPE: TYPE: CHRONIC PAIN

## 2024-03-09 ASSESSMENT — PAIN - FUNCTIONAL ASSESSMENT: PAIN_FUNCTIONAL_ASSESSMENT: ACTIVITIES ARE NOT PREVENTED

## 2024-03-09 ASSESSMENT — PAIN DESCRIPTION - DESCRIPTORS: DESCRIPTORS: DISCOMFORT;SORE

## 2024-03-09 ASSESSMENT — PAIN DESCRIPTION - ONSET: ONSET: ON-GOING

## 2024-03-10 ENCOUNTER — APPOINTMENT (OUTPATIENT)
Dept: GENERAL RADIOLOGY | Age: 54
DRG: 871 | End: 2024-03-10
Payer: COMMERCIAL

## 2024-03-10 LAB
ANION GAP SERPL CALCULATED.3IONS-SCNC: 10 MMOL/L (ref 7–16)
BUN SERPL-MCNC: 25 MG/DL (ref 6–20)
CALCIUM SERPL-MCNC: 8.8 MG/DL (ref 8.6–10.2)
CHLORIDE SERPL-SCNC: 90 MMOL/L (ref 98–107)
CO2 SERPL-SCNC: 35 MMOL/L (ref 22–29)
CREAT SERPL-MCNC: 0.8 MG/DL (ref 0.7–1.2)
GFR SERPL CREATININE-BSD FRML MDRD: >60 ML/MIN/1.73M2
GLUCOSE BLD-MCNC: 198 MG/DL (ref 74–99)
GLUCOSE BLD-MCNC: 228 MG/DL (ref 74–99)
GLUCOSE BLD-MCNC: 293 MG/DL (ref 74–99)
GLUCOSE BLD-MCNC: 296 MG/DL (ref 74–99)
GLUCOSE SERPL-MCNC: 171 MG/DL (ref 74–99)
POTASSIUM SERPL-SCNC: 3.1 MMOL/L (ref 3.5–5)
SODIUM SERPL-SCNC: 135 MMOL/L (ref 132–146)

## 2024-03-10 PROCEDURE — 82962 GLUCOSE BLOOD TEST: CPT

## 2024-03-10 PROCEDURE — 6360000002 HC RX W HCPCS: Performed by: NURSE PRACTITIONER

## 2024-03-10 PROCEDURE — 6360000002 HC RX W HCPCS: Performed by: STUDENT IN AN ORGANIZED HEALTH CARE EDUCATION/TRAINING PROGRAM

## 2024-03-10 PROCEDURE — 6370000000 HC RX 637 (ALT 250 FOR IP): Performed by: STUDENT IN AN ORGANIZED HEALTH CARE EDUCATION/TRAINING PROGRAM

## 2024-03-10 PROCEDURE — 71045 X-RAY EXAM CHEST 1 VIEW: CPT

## 2024-03-10 PROCEDURE — 2580000003 HC RX 258: Performed by: STUDENT IN AN ORGANIZED HEALTH CARE EDUCATION/TRAINING PROGRAM

## 2024-03-10 PROCEDURE — 2700000000 HC OXYGEN THERAPY PER DAY

## 2024-03-10 PROCEDURE — 6370000000 HC RX 637 (ALT 250 FOR IP): Performed by: NURSE PRACTITIONER

## 2024-03-10 PROCEDURE — 99232 SBSQ HOSP IP/OBS MODERATE 35: CPT | Performed by: STUDENT IN AN ORGANIZED HEALTH CARE EDUCATION/TRAINING PROGRAM

## 2024-03-10 PROCEDURE — 2580000003 HC RX 258

## 2024-03-10 PROCEDURE — 80048 BASIC METABOLIC PNL TOTAL CA: CPT

## 2024-03-10 PROCEDURE — 2060000000 HC ICU INTERMEDIATE R&B

## 2024-03-10 PROCEDURE — 94640 AIRWAY INHALATION TREATMENT: CPT

## 2024-03-10 PROCEDURE — 6370000000 HC RX 637 (ALT 250 FOR IP)

## 2024-03-10 PROCEDURE — 36415 COLL VENOUS BLD VENIPUNCTURE: CPT

## 2024-03-10 PROCEDURE — 94660 CPAP INITIATION&MGMT: CPT

## 2024-03-10 RX ORDER — POTASSIUM CHLORIDE 20 MEQ/1
40 TABLET, EXTENDED RELEASE ORAL ONCE
Status: COMPLETED | OUTPATIENT
Start: 2024-03-10 | End: 2024-03-10

## 2024-03-10 RX ORDER — PREDNISONE 20 MG/1
40 TABLET ORAL DAILY
Status: DISCONTINUED | OUTPATIENT
Start: 2024-03-11 | End: 2024-03-12 | Stop reason: HOSPADM

## 2024-03-10 RX ADMIN — PIPERACILLIN AND TAZOBACTAM 4500 MG: 4; .5 INJECTION, POWDER, FOR SOLUTION INTRAVENOUS at 14:05

## 2024-03-10 RX ADMIN — ARFORMOTEROL TARTRATE 15 MCG: 15 SOLUTION RESPIRATORY (INHALATION) at 19:43

## 2024-03-10 RX ADMIN — GABAPENTIN 600 MG: 300 CAPSULE ORAL at 14:04

## 2024-03-10 RX ADMIN — ENOXAPARIN SODIUM 30 MG: 100 INJECTION SUBCUTANEOUS at 07:46

## 2024-03-10 RX ADMIN — PIPERACILLIN AND TAZOBACTAM 4500 MG: 4; .5 INJECTION, POWDER, FOR SOLUTION INTRAVENOUS at 06:23

## 2024-03-10 RX ADMIN — BUPRENORPHINE HYDROCHLORIDE AND NALOXONE HYDROCHLORIDE DIHYDRATE 1 TABLET: 8; 2 TABLET SUBLINGUAL at 07:46

## 2024-03-10 RX ADMIN — ARFORMOTEROL TARTRATE 15 MCG: 15 SOLUTION RESPIRATORY (INHALATION) at 08:08

## 2024-03-10 RX ADMIN — INSULIN LISPRO 5 UNITS: 100 INJECTION, SOLUTION INTRAVENOUS; SUBCUTANEOUS at 11:02

## 2024-03-10 RX ADMIN — INSULIN LISPRO 5 UNITS: 100 INJECTION, SOLUTION INTRAVENOUS; SUBCUTANEOUS at 16:06

## 2024-03-10 RX ADMIN — GABAPENTIN 600 MG: 300 CAPSULE ORAL at 07:47

## 2024-03-10 RX ADMIN — IPRATROPIUM BROMIDE AND ALBUTEROL SULFATE 1 DOSE: 2.5; .5 SOLUTION RESPIRATORY (INHALATION) at 03:10

## 2024-03-10 RX ADMIN — IPRATROPIUM BROMIDE AND ALBUTEROL SULFATE 1 DOSE: 2.5; .5 SOLUTION RESPIRATORY (INHALATION) at 11:50

## 2024-03-10 RX ADMIN — GABAPENTIN 600 MG: 300 CAPSULE ORAL at 20:15

## 2024-03-10 RX ADMIN — IPRATROPIUM BROMIDE AND ALBUTEROL SULFATE 1 DOSE: 2.5; .5 SOLUTION RESPIRATORY (INHALATION) at 17:17

## 2024-03-10 RX ADMIN — INSULIN LISPRO 4 UNITS: 100 INJECTION, SOLUTION INTRAVENOUS; SUBCUTANEOUS at 16:06

## 2024-03-10 RX ADMIN — BUPRENORPHINE HYDROCHLORIDE AND NALOXONE HYDROCHLORIDE DIHYDRATE 1 TABLET: 8; 2 TABLET SUBLINGUAL at 20:14

## 2024-03-10 RX ADMIN — BUDESONIDE INHALATION 500 MCG: 0.5 SUSPENSION RESPIRATORY (INHALATION) at 19:42

## 2024-03-10 RX ADMIN — PIPERACILLIN AND TAZOBACTAM 4500 MG: 4; .5 INJECTION, POWDER, FOR SOLUTION INTRAVENOUS at 22:47

## 2024-03-10 RX ADMIN — FAMOTIDINE 20 MG: 20 TABLET ORAL at 07:46

## 2024-03-10 RX ADMIN — METHYLPREDNISOLONE SODIUM SUCCINATE 40 MG: 40 INJECTION INTRAMUSCULAR; INTRAVENOUS at 07:47

## 2024-03-10 RX ADMIN — IPRATROPIUM BROMIDE AND ALBUTEROL SULFATE 1 DOSE: 2.5; .5 SOLUTION RESPIRATORY (INHALATION) at 08:08

## 2024-03-10 RX ADMIN — SODIUM CHLORIDE, PRESERVATIVE FREE 10 ML: 5 INJECTION INTRAVENOUS at 07:47

## 2024-03-10 RX ADMIN — ACETAZOLAMIDE 250 MG: 250 TABLET ORAL at 07:47

## 2024-03-10 RX ADMIN — INSULIN GLARGINE 10 UNITS: 100 INJECTION, SOLUTION SUBCUTANEOUS at 20:20

## 2024-03-10 RX ADMIN — SODIUM CHLORIDE, PRESERVATIVE FREE 10 ML: 5 INJECTION INTRAVENOUS at 07:48

## 2024-03-10 RX ADMIN — IPRATROPIUM BROMIDE AND ALBUTEROL SULFATE 1 DOSE: 2.5; .5 SOLUTION RESPIRATORY (INHALATION) at 19:42

## 2024-03-10 RX ADMIN — BUDESONIDE INHALATION 500 MCG: 0.5 SUSPENSION RESPIRATORY (INHALATION) at 08:08

## 2024-03-10 RX ADMIN — ENOXAPARIN SODIUM 30 MG: 100 INJECTION SUBCUTANEOUS at 20:14

## 2024-03-10 RX ADMIN — POTASSIUM CHLORIDE 40 MEQ: 1500 TABLET, EXTENDED RELEASE ORAL at 11:02

## 2024-03-10 RX ADMIN — INSULIN LISPRO 5 UNITS: 100 INJECTION, SOLUTION INTRAVENOUS; SUBCUTANEOUS at 08:00

## 2024-03-10 RX ADMIN — INSULIN LISPRO 2 UNITS: 100 INJECTION, SOLUTION INTRAVENOUS; SUBCUTANEOUS at 11:01

## 2024-03-10 NOTE — PLAN OF CARE
Problem: Discharge Planning  Goal: Discharge to home or other facility with appropriate resources  Outcome: Progressing     Problem: Safety - Adult  Goal: Free from fall injury  Outcome: Progressing     Problem: Pain  Goal: Verbalizes/displays adequate comfort level or baseline comfort level  Outcome: Progressing     Problem: Nutrition Deficit:  Goal: Optimize nutritional status  Outcome: Progressing

## 2024-03-11 ENCOUNTER — APPOINTMENT (OUTPATIENT)
Dept: GENERAL RADIOLOGY | Age: 54
DRG: 871 | End: 2024-03-11
Payer: COMMERCIAL

## 2024-03-11 PROBLEM — F17.210 DEPENDENCE ON NICOTINE FROM CIGARETTES: Status: ACTIVE | Noted: 2024-03-11

## 2024-03-11 LAB
ANION GAP SERPL CALCULATED.3IONS-SCNC: 12 MMOL/L (ref 7–16)
ANION GAP SERPL CALCULATED.3IONS-SCNC: 8 MMOL/L (ref 7–16)
BUN SERPL-MCNC: 17 MG/DL (ref 6–20)
BUN SERPL-MCNC: 21 MG/DL (ref 6–20)
CALCIUM SERPL-MCNC: 8.7 MG/DL (ref 8.6–10.2)
CALCIUM SERPL-MCNC: 8.8 MG/DL (ref 8.6–10.2)
CHLORIDE SERPL-SCNC: 92 MMOL/L (ref 98–107)
CHLORIDE SERPL-SCNC: 92 MMOL/L (ref 98–107)
CO2 SERPL-SCNC: 27 MMOL/L (ref 22–29)
CO2 SERPL-SCNC: 31 MMOL/L (ref 22–29)
CREAT SERPL-MCNC: 0.7 MG/DL (ref 0.7–1.2)
CREAT SERPL-MCNC: 0.8 MG/DL (ref 0.7–1.2)
GFR SERPL CREATININE-BSD FRML MDRD: >60 ML/MIN/1.73M2
GFR SERPL CREATININE-BSD FRML MDRD: >60 ML/MIN/1.73M2
GLUCOSE BLD-MCNC: 137 MG/DL (ref 74–99)
GLUCOSE BLD-MCNC: 268 MG/DL (ref 74–99)
GLUCOSE BLD-MCNC: 273 MG/DL (ref 74–99)
GLUCOSE BLD-MCNC: 288 MG/DL (ref 74–99)
GLUCOSE SERPL-MCNC: 336 MG/DL (ref 74–99)
GLUCOSE SERPL-MCNC: 360 MG/DL (ref 74–99)
POTASSIUM SERPL-SCNC: 3 MMOL/L (ref 3.5–5)
POTASSIUM SERPL-SCNC: 4.1 MMOL/L (ref 3.5–5)
SODIUM SERPL-SCNC: 131 MMOL/L (ref 132–146)
SODIUM SERPL-SCNC: 131 MMOL/L (ref 132–146)

## 2024-03-11 PROCEDURE — 6370000000 HC RX 637 (ALT 250 FOR IP): Performed by: INTERNAL MEDICINE

## 2024-03-11 PROCEDURE — 6370000000 HC RX 637 (ALT 250 FOR IP): Performed by: STUDENT IN AN ORGANIZED HEALTH CARE EDUCATION/TRAINING PROGRAM

## 2024-03-11 PROCEDURE — 2580000003 HC RX 258: Performed by: STUDENT IN AN ORGANIZED HEALTH CARE EDUCATION/TRAINING PROGRAM

## 2024-03-11 PROCEDURE — 6370000000 HC RX 637 (ALT 250 FOR IP): Performed by: NURSE PRACTITIONER

## 2024-03-11 PROCEDURE — 36415 COLL VENOUS BLD VENIPUNCTURE: CPT

## 2024-03-11 PROCEDURE — 82962 GLUCOSE BLOOD TEST: CPT

## 2024-03-11 PROCEDURE — 2700000000 HC OXYGEN THERAPY PER DAY

## 2024-03-11 PROCEDURE — 6360000002 HC RX W HCPCS: Performed by: INTERNAL MEDICINE

## 2024-03-11 PROCEDURE — 80048 BASIC METABOLIC PNL TOTAL CA: CPT

## 2024-03-11 PROCEDURE — 2580000003 HC RX 258

## 2024-03-11 PROCEDURE — 99233 SBSQ HOSP IP/OBS HIGH 50: CPT | Performed by: INTERNAL MEDICINE

## 2024-03-11 PROCEDURE — 94660 CPAP INITIATION&MGMT: CPT

## 2024-03-11 PROCEDURE — 71045 X-RAY EXAM CHEST 1 VIEW: CPT

## 2024-03-11 PROCEDURE — 6360000002 HC RX W HCPCS: Performed by: STUDENT IN AN ORGANIZED HEALTH CARE EDUCATION/TRAINING PROGRAM

## 2024-03-11 PROCEDURE — 2060000000 HC ICU INTERMEDIATE R&B

## 2024-03-11 PROCEDURE — 94640 AIRWAY INHALATION TREATMENT: CPT

## 2024-03-11 RX ORDER — BUMETANIDE 1 MG/1
2 TABLET ORAL 2 TIMES DAILY
Status: DISCONTINUED | OUTPATIENT
Start: 2024-03-11 | End: 2024-03-12 | Stop reason: HOSPADM

## 2024-03-11 RX ORDER — POTASSIUM CHLORIDE 7.45 MG/ML
10 INJECTION INTRAVENOUS
Status: COMPLETED | OUTPATIENT
Start: 2024-03-11 | End: 2024-03-11

## 2024-03-11 RX ORDER — INSULIN GLARGINE 100 [IU]/ML
20 INJECTION, SOLUTION SUBCUTANEOUS NIGHTLY
Status: DISCONTINUED | OUTPATIENT
Start: 2024-03-11 | End: 2024-03-12 | Stop reason: HOSPADM

## 2024-03-11 RX ORDER — POTASSIUM CHLORIDE 20 MEQ/1
20 TABLET, EXTENDED RELEASE ORAL
Status: DISCONTINUED | OUTPATIENT
Start: 2024-03-11 | End: 2024-03-12

## 2024-03-11 RX ADMIN — SODIUM CHLORIDE, PRESERVATIVE FREE 10 ML: 5 INJECTION INTRAVENOUS at 08:44

## 2024-03-11 RX ADMIN — BUPRENORPHINE HYDROCHLORIDE AND NALOXONE HYDROCHLORIDE DIHYDRATE 1 TABLET: 8; 2 TABLET SUBLINGUAL at 20:06

## 2024-03-11 RX ADMIN — INSULIN LISPRO 4 UNITS: 100 INJECTION, SOLUTION INTRAVENOUS; SUBCUTANEOUS at 06:43

## 2024-03-11 RX ADMIN — INSULIN LISPRO 5 UNITS: 100 INJECTION, SOLUTION INTRAVENOUS; SUBCUTANEOUS at 11:14

## 2024-03-11 RX ADMIN — IPRATROPIUM BROMIDE AND ALBUTEROL SULFATE 1 DOSE: 2.5; .5 SOLUTION RESPIRATORY (INHALATION) at 08:02

## 2024-03-11 RX ADMIN — SODIUM CHLORIDE, PRESERVATIVE FREE 10 ML: 5 INJECTION INTRAVENOUS at 20:08

## 2024-03-11 RX ADMIN — INSULIN LISPRO 4 UNITS: 100 INJECTION, SOLUTION INTRAVENOUS; SUBCUTANEOUS at 15:29

## 2024-03-11 RX ADMIN — GABAPENTIN 600 MG: 300 CAPSULE ORAL at 08:38

## 2024-03-11 RX ADMIN — ARFORMOTEROL TARTRATE 15 MCG: 15 SOLUTION RESPIRATORY (INHALATION) at 08:03

## 2024-03-11 RX ADMIN — PREDNISONE 40 MG: 20 TABLET ORAL at 08:38

## 2024-03-11 RX ADMIN — INSULIN LISPRO 5 UNITS: 100 INJECTION, SOLUTION INTRAVENOUS; SUBCUTANEOUS at 15:28

## 2024-03-11 RX ADMIN — POTASSIUM CHLORIDE 20 MEQ: 1500 TABLET, EXTENDED RELEASE ORAL at 11:13

## 2024-03-11 RX ADMIN — BUDESONIDE INHALATION 500 MCG: 0.5 SUSPENSION RESPIRATORY (INHALATION) at 21:09

## 2024-03-11 RX ADMIN — IPRATROPIUM BROMIDE AND ALBUTEROL SULFATE 1 DOSE: 2.5; .5 SOLUTION RESPIRATORY (INHALATION) at 21:09

## 2024-03-11 RX ADMIN — GABAPENTIN 600 MG: 300 CAPSULE ORAL at 13:07

## 2024-03-11 RX ADMIN — SODIUM CHLORIDE: 9 INJECTION, SOLUTION INTRAVENOUS at 13:36

## 2024-03-11 RX ADMIN — BUDESONIDE INHALATION 500 MCG: 0.5 SUSPENSION RESPIRATORY (INHALATION) at 08:03

## 2024-03-11 RX ADMIN — BUMETANIDE 2 MG: 1 TABLET ORAL at 20:06

## 2024-03-11 RX ADMIN — IPRATROPIUM BROMIDE AND ALBUTEROL SULFATE 1 DOSE: 2.5; .5 SOLUTION RESPIRATORY (INHALATION) at 03:57

## 2024-03-11 RX ADMIN — GABAPENTIN 600 MG: 300 CAPSULE ORAL at 20:06

## 2024-03-11 RX ADMIN — POTASSIUM CHLORIDE 10 MEQ: 7.46 INJECTION, SOLUTION INTRAVENOUS at 12:24

## 2024-03-11 RX ADMIN — POTASSIUM CHLORIDE 10 MEQ: 10 INJECTION, SOLUTION INTRAVENOUS at 13:32

## 2024-03-11 RX ADMIN — POTASSIUM CHLORIDE 40 MEQ: 1500 TABLET, EXTENDED RELEASE ORAL at 06:45

## 2024-03-11 RX ADMIN — IPRATROPIUM BROMIDE AND ALBUTEROL SULFATE 1 DOSE: 2.5; .5 SOLUTION RESPIRATORY (INHALATION) at 00:26

## 2024-03-11 RX ADMIN — INSULIN GLARGINE 20 UNITS: 100 INJECTION, SOLUTION SUBCUTANEOUS at 20:08

## 2024-03-11 RX ADMIN — ARFORMOTEROL TARTRATE 15 MCG: 15 SOLUTION RESPIRATORY (INHALATION) at 21:09

## 2024-03-11 RX ADMIN — BUPRENORPHINE HYDROCHLORIDE AND NALOXONE HYDROCHLORIDE DIHYDRATE 1 TABLET: 8; 2 TABLET SUBLINGUAL at 08:38

## 2024-03-11 RX ADMIN — IPRATROPIUM BROMIDE AND ALBUTEROL SULFATE 1 DOSE: 2.5; .5 SOLUTION RESPIRATORY (INHALATION) at 15:47

## 2024-03-11 RX ADMIN — POTASSIUM CHLORIDE 20 MEQ: 1500 TABLET, EXTENDED RELEASE ORAL at 17:27

## 2024-03-11 RX ADMIN — ENOXAPARIN SODIUM 30 MG: 100 INJECTION SUBCUTANEOUS at 09:42

## 2024-03-11 RX ADMIN — POTASSIUM CHLORIDE 10 MEQ: 10 INJECTION, SOLUTION INTRAVENOUS at 15:38

## 2024-03-11 RX ADMIN — ENOXAPARIN SODIUM 30 MG: 100 INJECTION SUBCUTANEOUS at 20:07

## 2024-03-11 RX ADMIN — FAMOTIDINE 20 MG: 20 TABLET ORAL at 08:38

## 2024-03-11 RX ADMIN — SODIUM CHLORIDE, PRESERVATIVE FREE 5 ML: 5 INJECTION INTRAVENOUS at 09:42

## 2024-03-11 RX ADMIN — INSULIN LISPRO 5 UNITS: 100 INJECTION, SOLUTION INTRAVENOUS; SUBCUTANEOUS at 06:47

## 2024-03-11 RX ADMIN — INSULIN HUMAN 10 UNITS: 100 INJECTION, SUSPENSION SUBCUTANEOUS at 12:27

## 2024-03-11 NOTE — CARE COORDINATION
Social work / Discharge planning:          Pulse ox testing completed and patient will need oxygen for discharge.    Social work met with patient and he has no preference for DME provider.    Referral made to [facility].    Electronically signed by MORELIA Quiles on 3/11/2024 at 12:17 PM           [facility] is out of network with patient's insurance.    Referral made to [facility] to check benefits.    Awaiting response.   Electronically signed by MORELIA Quiles on 3/11/2024 at 12:33 PM           [facility] will deliver portable oxygen today in anticipation of discharge.    Electronically signed by MORELIA Quiles on 3/11/2024 at 2:40 PM

## 2024-03-11 NOTE — CARE COORDINATION
Social work / Discharge planning:           Discharge plan is home.     Discussed in IDR that pulse ox testing is needed prior to discharge.    Electronically signed by MORELIA Quiles on 3/11/2024 at 11:12 AM

## 2024-03-12 VITALS
OXYGEN SATURATION: 91 % | HEART RATE: 71 BPM | BODY MASS INDEX: 34.17 KG/M2 | RESPIRATION RATE: 18 BRPM | HEIGHT: 72 IN | SYSTOLIC BLOOD PRESSURE: 122 MMHG | TEMPERATURE: 97.8 F | WEIGHT: 252.3 LBS | DIASTOLIC BLOOD PRESSURE: 63 MMHG

## 2024-03-12 PROBLEM — J96.01 ACUTE HYPOXIC RESPIRATORY FAILURE (HCC): Status: RESOLVED | Noted: 2024-03-03 | Resolved: 2024-03-12

## 2024-03-12 LAB
ANION GAP SERPL CALCULATED.3IONS-SCNC: 13 MMOL/L (ref 7–16)
BUN SERPL-MCNC: 17 MG/DL (ref 6–20)
CALCIUM SERPL-MCNC: 8.8 MG/DL (ref 8.6–10.2)
CHLORIDE SERPL-SCNC: 98 MMOL/L (ref 98–107)
CO2 SERPL-SCNC: 25 MMOL/L (ref 22–29)
CREAT SERPL-MCNC: 0.6 MG/DL (ref 0.7–1.2)
GFR SERPL CREATININE-BSD FRML MDRD: >60 ML/MIN/1.73M2
GLUCOSE BLD-MCNC: 164 MG/DL (ref 74–99)
GLUCOSE BLD-MCNC: 166 MG/DL (ref 74–99)
GLUCOSE SERPL-MCNC: 198 MG/DL (ref 74–99)
POTASSIUM SERPL-SCNC: 3.3 MMOL/L (ref 3.5–5)
SODIUM SERPL-SCNC: 136 MMOL/L (ref 132–146)

## 2024-03-12 PROCEDURE — 94660 CPAP INITIATION&MGMT: CPT

## 2024-03-12 PROCEDURE — 80048 BASIC METABOLIC PNL TOTAL CA: CPT

## 2024-03-12 PROCEDURE — 6360000002 HC RX W HCPCS: Performed by: STUDENT IN AN ORGANIZED HEALTH CARE EDUCATION/TRAINING PROGRAM

## 2024-03-12 PROCEDURE — 82962 GLUCOSE BLOOD TEST: CPT

## 2024-03-12 PROCEDURE — 6370000000 HC RX 637 (ALT 250 FOR IP): Performed by: STUDENT IN AN ORGANIZED HEALTH CARE EDUCATION/TRAINING PROGRAM

## 2024-03-12 PROCEDURE — 94640 AIRWAY INHALATION TREATMENT: CPT

## 2024-03-12 PROCEDURE — 6370000000 HC RX 637 (ALT 250 FOR IP): Performed by: INTERNAL MEDICINE

## 2024-03-12 PROCEDURE — 2700000000 HC OXYGEN THERAPY PER DAY

## 2024-03-12 PROCEDURE — 6370000000 HC RX 637 (ALT 250 FOR IP): Performed by: NURSE PRACTITIONER

## 2024-03-12 PROCEDURE — 36415 COLL VENOUS BLD VENIPUNCTURE: CPT

## 2024-03-12 PROCEDURE — 99239 HOSP IP/OBS DSCHRG MGMT >30: CPT | Performed by: INTERNAL MEDICINE

## 2024-03-12 PROCEDURE — 2580000003 HC RX 258: Performed by: STUDENT IN AN ORGANIZED HEALTH CARE EDUCATION/TRAINING PROGRAM

## 2024-03-12 PROCEDURE — 2580000003 HC RX 258

## 2024-03-12 RX ORDER — POTASSIUM CHLORIDE 20 MEQ/1
40 TABLET, EXTENDED RELEASE ORAL ONCE
Status: COMPLETED | OUTPATIENT
Start: 2024-03-12 | End: 2024-03-12

## 2024-03-12 RX ORDER — NICOTINE 21 MG/24HR
1 PATCH, TRANSDERMAL 24 HOURS TRANSDERMAL DAILY
Qty: 30 PATCH | Refills: 3 | Status: SHIPPED | OUTPATIENT
Start: 2024-03-13

## 2024-03-12 RX ORDER — POTASSIUM CHLORIDE 7.45 MG/ML
10 INJECTION INTRAVENOUS
Status: DISCONTINUED | OUTPATIENT
Start: 2024-03-12 | End: 2024-03-12

## 2024-03-12 RX ORDER — BUMETANIDE 2 MG/1
2 TABLET ORAL 2 TIMES DAILY
Qty: 30 TABLET | Refills: 3 | Status: SHIPPED | OUTPATIENT
Start: 2024-03-12

## 2024-03-12 RX ORDER — PREDNISONE 10 MG/1
TABLET ORAL
Qty: 30 TABLET | Refills: 0 | Status: SHIPPED | OUTPATIENT
Start: 2024-03-13

## 2024-03-12 RX ADMIN — INSULIN LISPRO 5 UNITS: 100 INJECTION, SOLUTION INTRAVENOUS; SUBCUTANEOUS at 11:17

## 2024-03-12 RX ADMIN — PREDNISONE 40 MG: 20 TABLET ORAL at 09:00

## 2024-03-12 RX ADMIN — ENOXAPARIN SODIUM 30 MG: 100 INJECTION SUBCUTANEOUS at 08:59

## 2024-03-12 RX ADMIN — POTASSIUM CHLORIDE 40 MEQ: 1500 TABLET, EXTENDED RELEASE ORAL at 11:46

## 2024-03-12 RX ADMIN — GABAPENTIN 600 MG: 300 CAPSULE ORAL at 13:07

## 2024-03-12 RX ADMIN — IPRATROPIUM BROMIDE AND ALBUTEROL SULFATE 1 DOSE: 2.5; .5 SOLUTION RESPIRATORY (INHALATION) at 08:00

## 2024-03-12 RX ADMIN — POTASSIUM CHLORIDE 20 MEQ: 1500 TABLET, EXTENDED RELEASE ORAL at 11:17

## 2024-03-12 RX ADMIN — INSULIN LISPRO 5 UNITS: 100 INJECTION, SOLUTION INTRAVENOUS; SUBCUTANEOUS at 06:42

## 2024-03-12 RX ADMIN — BUPRENORPHINE HYDROCHLORIDE AND NALOXONE HYDROCHLORIDE DIHYDRATE 1 TABLET: 8; 2 TABLET SUBLINGUAL at 09:00

## 2024-03-12 RX ADMIN — GABAPENTIN 600 MG: 300 CAPSULE ORAL at 09:00

## 2024-03-12 RX ADMIN — ARFORMOTEROL TARTRATE 15 MCG: 15 SOLUTION RESPIRATORY (INHALATION) at 08:00

## 2024-03-12 RX ADMIN — SODIUM CHLORIDE, PRESERVATIVE FREE 5 ML: 5 INJECTION INTRAVENOUS at 11:34

## 2024-03-12 RX ADMIN — IPRATROPIUM BROMIDE AND ALBUTEROL SULFATE 1 DOSE: 2.5; .5 SOLUTION RESPIRATORY (INHALATION) at 00:59

## 2024-03-12 RX ADMIN — FAMOTIDINE 20 MG: 20 TABLET ORAL at 09:00

## 2024-03-12 RX ADMIN — IPRATROPIUM BROMIDE AND ALBUTEROL SULFATE 1 DOSE: 2.5; .5 SOLUTION RESPIRATORY (INHALATION) at 12:19

## 2024-03-12 RX ADMIN — POTASSIUM CHLORIDE 20 MEQ: 1500 TABLET, EXTENDED RELEASE ORAL at 09:00

## 2024-03-12 RX ADMIN — BUDESONIDE INHALATION 500 MCG: 0.5 SUSPENSION RESPIRATORY (INHALATION) at 08:00

## 2024-03-12 RX ADMIN — BUMETANIDE 2 MG: 1 TABLET ORAL at 08:59

## 2024-03-12 NOTE — CARE COORDINATION
Social work / Discharge planning:            Discharge plan is home.     [employer] delivered oxygen yesterday.      If patient does not discharge today, will need updated pulse ox testing and orders.    Electronically signed by MORELIA Quiles on 3/12/2024 at 11:52 AM

## 2024-03-12 NOTE — PROGRESS NOTES
Jefe Shelton M.D.,[facility]  Maldonado Pedersen D.O., LUIS ALBERTO., [facility]  Markos Cruz M.D.  Alvina Santiago M.D.   ETHEL JosephO.        Daily Pulmonary Progress Note    Patient:  Scott De Luna 53 y.o. male MRN: [id number]     Date of Service: 3/12/2024      Synopsis     We are following patient for community acquired pneumonia    \"CC\" shortness of breath    Code status: full code      Subjective      Patient was seen and examined sitting up in bed.  He is on room air, SpO2 89%.  Ambulatory oxygen testing determines the need for 4 liters when ambulating.  The bumex infusion has been discontinued and he is on oral.  Urine output yesterday was 1.9 L.    Review of Systems:  Constitutional: Denies fever, weight loss, night sweats, and fatigue  Skin: Denies pigmentation, dark lesions, and rashes   HEENT: Denies hearing loss, tinnitus, ear drainage, epistaxis, sore throat, and hoarseness.  Cardiovascular: Denies palpitations, chest pain, and chest pressure.  Respiratory: + cough, denies dyspnea at rest, hemoptysis, apnea, and choking.  Gastrointestinal: Denies nausea, vomiting, poor appetite, diarrhea, heartburn or reflux  Genitourinary: Denies dysuria, frequency, urgency or hematuria  Musculoskeletal: Denies myalgias, muscle weakness, and bone pain  Neurological: Denies dizziness, vertigo, headache, and focal weakness  Psychological: Denies anxiety and depression  Endocrine: Denies heat intolerance and cold intolerance  Hematopoietic/Lymphatic: Denies bleeding problems and blood transfusions    24-hour events:  Bumex infusion stopped    Objective   OBJECTIVE:   /89   Pulse 70   Temp 98.5 °F (36.9 °C) (Oral)   Resp 18   Ht 1.829 m (6')   Wt 114.4 kg (252 lb 4.8 oz)   SpO2 93%   BMI 34.22 kg/m²   SpO2 Readings from Last 1 Encounters:   03/12/24 93%        I/O:    Intake/Output Summary (Last 24 hours) at 3/12/2024 1055  Last data filed at 3/12/2024 0732  Gross per 24 hour   Intake 180 ml 
           Jefe Shelton M.D.,[facility]  Maldonado Pedersen D.O., LUIS ALBERTO., [facility]  Markos Cruz M.D.  Alvina Santiago M.D.   TATE Joseph.O.        Daily Pulmonary Progress Note    Patient:  Scott De Luna 53 y.o. male MRN: [id number]     Date of Service: 3/8/2024      Synopsis     We are following patient for community acquired pneumonia    \"CC\" shortness of breath    Code status: full code      Subjective      Patient was seen and examined sitting up in no distress.  He is 100% on room 14 L high flow nasal cannula.  I decreased the flow to 12 L.  His lungs sound much better and have more airflow.  MRSA screening was negative, will discontinue vancomycin.    Review of Systems:  Constitutional: Denies fever, weight loss, night sweats, and fatigue  Skin: Denies pigmentation, dark lesions, and rashes   HEENT: Denies hearing loss, tinnitus, ear drainage, epistaxis, sore throat, and hoarseness.  Cardiovascular: Denies palpitations, chest pain, and chest pressure.  Respiratory: + cough, denies dyspnea at rest, hemoptysis, apnea, and choking.  Gastrointestinal: Denies nausea, vomiting, poor appetite, diarrhea, heartburn or reflux  Genitourinary: Denies dysuria, frequency, urgency or hematuria  Musculoskeletal: Denies myalgias, muscle weakness, and bone pain  Neurological: Denies dizziness, vertigo, headache, and focal weakness  Psychological: Denies anxiety and depression  Endocrine: Denies heat intolerance and cold intolerance  Hematopoietic/Lymphatic: Denies bleeding problems and blood transfusions    24-hour events:  No new events    Objective   OBJECTIVE:   /77   Pulse 71   Temp 97.6 °F (36.4 °C) (Oral)   Resp 18   Ht 1.829 m (6')   Wt 122.8 kg (270 lb 12.8 oz)   SpO2 94%   BMI 36.73 kg/m²   SpO2 Readings from Last 1 Encounters:   03/08/24 94%        I/O:    Intake/Output Summary (Last 24 hours) at 3/8/2024 1314  Last data filed at 3/8/2024 1146  Gross per 24 hour   Intake --   Output 5475 ml   Net 
           Jefe Shelton M.D.,[facility]  Maldonado Pedersen D.O., F.RUBI.MARY GRACE., [facility]  Markos Cruz M.D.  Alvina Santiago M.D.   ETHEL JosephO.        Daily Pulmonary Progress Note    Patient:  Scott De Luna 53 y.o. male MRN: [id number]     Date of Service: 3/9/2024      Synopsis     We are following patient for community acquired pneumonia    \"CC\" shortness of breath    Code status: full code      Subjective      Patient was seen and examined sitting on the couch in no apparent distress.  His oxygen has been weaned down to 6 L.  Legs are still considerably edematous and he still has a cough, but his wheezing is a little better.  He has been wearing the BiPAP at night, but says he takes it off in his sleep.  Recommend stopping Bumex drip, he is starting to demonstrate contraction alkalosis.  ABGs reviewed.    Review of Systems:  Constitutional: Denies fever, weight loss, night sweats, and fatigue  Skin: Denies pigmentation, dark lesions, and rashes   HEENT: Denies hearing loss, tinnitus, ear drainage, epistaxis, sore throat, and hoarseness.  Cardiovascular: Denies palpitations, chest pain, and chest pressure.  Respiratory: + cough, denies dyspnea at rest, hemoptysis, apnea, and choking.  Gastrointestinal: Denies nausea, vomiting, poor appetite, diarrhea, heartburn or reflux  Genitourinary: Denies dysuria, frequency, urgency or hematuria  Musculoskeletal: Denies myalgias, muscle weakness, and bone pain  Neurological: Denies dizziness, vertigo, headache, and focal weakness  Psychological: Denies anxiety and depression  Endocrine: Denies heat intolerance and cold intolerance  Hematopoietic/Lymphatic: Denies bleeding problems and blood transfusions    24-hour events:  No new events    Objective   OBJECTIVE:   /66   Pulse 65   Temp 98.1 °F (36.7 °C) (Oral)   Resp 18   Ht 1.829 m (6')   Wt 122.8 kg (270 lb 12.8 oz)   SpO2 93%   BMI 36.73 kg/m²   SpO2 Readings from Last 1 Encounters:   03/09/24 93%      
         Pulse ox was 93% on room air at rest.  Ambulated patient on room air.  Oxygen saturation was 82% on room air while ambulating.  Oxygen applied.  Recovery pulse ox was 94% on 4 liters of oxygen while ambulating.            
       [facility] Hospitalist Progress Note    Admitting Date and Time: 3/3/2024  6:10 PM  Admit Dx: Acute respiratory failure with hypoxia (HCC) [J96.01]  Acute hypoxic respiratory failure (HCC) [J96.01]    Subjective:  Patient is being followed for Acute respiratory failure with hypoxia (HCC) [J96.01]  Acute hypoxic respiratory failure (HCC) [J96.01]   Pt feels improvement in breathing, also anxious.   Per RN- weaned to 4L. Wore NIV overnight,      ROS: denies fever, chills, cp, sob, n/v, HA unless stated above.      [START ON 3/11/2024] predniSONE  40 mg Oral Daily    insulin glargine  10 Units SubCUTAneous Nightly    insulin lispro  5 Units SubCUTAneous TID WC    gabapentin  600 mg Oral TID    insulin lispro  0-8 Units SubCUTAneous TID WC    insulin lispro  0-4 Units SubCUTAneous Nightly    nicotine  1 patch TransDERmal Daily    sodium chloride flush  5-40 mL IntraVENous 2 times per day    sodium chloride flush  5-40 mL IntraVENous 2 times per day    enoxaparin  30 mg SubCUTAneous BID    famotidine  20 mg Oral Daily    ipratropium 0.5 mg-albuterol 2.5 mg  1 Dose Inhalation Q4H RT    piperacillin-tazobactam  4,500 mg IntraVENous q8h    buprenorphine-naloxone  1 tablet SubLINGual BID    budesonide  0.5 mg Nebulization BID RT    And    arformoterol tartrate  15 mcg Nebulization BID RT     glucose, 4 tablet, PRN  dextrose bolus, 125 mL, PRN   Or  dextrose bolus, 250 mL, PRN  glucagon (rDNA), 1 mg, PRN  dextrose, , Continuous PRN  guaiFENesin, 400 mg, 4x Daily PRN  sodium chloride flush, 5-40 mL, PRN  sodium chloride flush, 5-40 mL, PRN  sodium chloride, , PRN  potassium chloride, 40 mEq, PRN   Or  potassium alternative oral replacement, 40 mEq, PRN   Or  potassium chloride, 10 mEq, PRN  magnesium sulfate, 2,000 mg, PRN  ondansetron, 4 mg, Q8H PRN   Or  ondansetron, 4 mg, Q6H PRN  polyethylene glycol, 17 g, Daily PRN  acetaminophen, 650 mg, Q6H PRN   Or  acetaminophen, 650 mg, Q6H PRN         Objective:    BP 
    Progress  Note  Chief Complaint   Patient presents with    Shortness of Breath     SOB, cough, wheezing since yesterday. Rib pain with inspiration. +chills    Abdominal Pain     LLQ onset this morning.     Historical Issues:  Current Facility-Administered Medications   Medication Dose Route Frequency Provider Last Rate Last Admin    predniSONE (DELTASONE) tablet 40 mg  40 mg Oral Daily Alvina Santiago MD   40 mg at 03/11/24 0838    bumetanide (BUMEX) 12.5 mg in sodium chloride 0.9 % 125 mL infusion  0.2 mg/hr IntraVENous Continuous Diamond Norris MD 2 mL/hr at 03/09/24 1518 0.2 mg/hr at 03/09/24 1518    insulin glargine (LANTUS) injection vial 10 Units  10 Units SubCUTAneous Nightly Yenni Bustos MD   10 Units at 03/10/24 2020    insulin lispro (HUMALOG) injection vial 5 Units  5 Units SubCUTAneous TID  Yenni Bustos MD   5 Units at 03/11/24 0647    gabapentin (NEURONTIN) capsule 600 mg  600 mg Oral TID Naun Bustos MD   600 mg at 03/11/24 0838    glucose chewable tablet 16 g  4 tablet Oral PRN Yenni Bustos MD        dextrose bolus 10% 125 mL  125 mL IntraVENous PRN Yenni Bustos MD        Or    dextrose bolus 10% 250 mL  250 mL IntraVENous PRN Yenni Bustos MD        glucagon injection 1 mg  1 mg SubCUTAneous PRN Yenni Bustos MD        dextrose 10 % infusion   IntraVENous Continuous PRN Yenni Bustos MD        insulin lispro (HUMALOG) injection vial 0-8 Units  0-8 Units SubCUTAneous TID  Yenni Bustos MD   4 Units at 03/11/24 0643    insulin lispro (HUMALOG) injection vial 0-4 Units  0-4 Units SubCUTAneous Nightly Yenni Bustos MD   4 Units at 03/07/24 1959    nicotine (NICODERM CQ) 21 MG/24HR 1 patch  1 patch TransDERmal Daily Ana Masters APRN - CNP   1 patch at 03/11/24 0838    guaiFENesin tablet 400 mg  400 mg Oral 4x Daily PRN Johnny Patel DO   400 mg at 03/08/24 1036    sodium chloride flush 0.9 % injection 5-40 mL  5-40 mL IntraVENous 2 times per day Arnulfo Perez MD   5 mL at 
  Physician Progress Note      PATIENT:               KADEEM HAMMOND  CSN #:                  [id number]  :                       1970  ADMIT DATE:       3/3/2024 6:10 PM  DISCH DATE:  RESPONDING  PROVIDER #:        Yenni Bustos MD          QUERY TEXT:    Pt admitted with Pneumonia. Pt noted to have elevated WBC and lactic acid. If   possible, please document in the progress notes and discharge summary if you   are evaluating and/or treating any of the following:    The medical record reflects the following:  Risk Factors: mediastinal adenopathy  Clinical Indicators: lactic 6.3, WBC 25.5, RR 35, per pulmonary consult   \"...Severe PNA. Abnormal CT with b/l patchy with somewhat peripheral   infiltrates ?emboli...\", per pulmonary 3/4 \"...Sepsis with severe   pneumonia...\", per IM \"...Community-acquired pneumonia - presented with stated   symptoms along with CT showing multifocal lobular consolidative process in   the bilateral lower lungs along with ill-defined opacities in bilateral upper   lung, WBC elevated, started on Vanco and Zosyn in ED, will continue...\"  Treatment: pulm consult, IV Vanc and Zosyn    Thank you,  Mary Page RN, BSN, CDIS  Clinical Documentation Integrity  [email or web address]  Options provided:  -- Sepsis, present on admission  -- Pneumonia without Sepsis  -- Sepsis was ruled out  -- Other - I will add my own diagnosis  -- Disagree - Not applicable / Not valid  -- Disagree - Clinically unable to determine / Unknown  -- Refer to Clinical Documentation Reviewer    PROVIDER RESPONSE TEXT:    This patient has sepsis which was present on admission.    Query created by: Mary Page on 3/5/2024 11:26 AM      Electronically signed by:  Yenni Bustos MD 3/5/2024 11:28 AM          
  Physician Progress Note      PATIENT:               KADEEM HAMMOND  CSN #:                  [id number]  :                       1970  ADMIT DATE:       3/3/2024 6:10 PM  DISCH DATE:  RESPONDING  PROVIDER #:        Yenni Bustos MD          QUERY TEXT:    Pt admitted with Sepsis and has CHF documented. If possible, please document   in progress notes and discharge summary further specificity regarding the type   and acuity of CHF:    The medical record reflects the following:  Risk Factors: Pneumonia  Clinical Indicators: BNP 3169, +1 generalized pitting edema, per renal   \"...HFpEF 60%, proBNP 3169> 1519, chest x-ray with worsening infiltrates   consistent with pulmonary edema, to start Bumex drip...\"  Treatment: renal consult, Bumex drip    Thank you,  Mary Page RN, BSN, CDIS  Clinical Documentation Integrity  [email or web address]  Options provided:  -- Acute on Chronic Diastolic CHF/HFpEF  -- Acute Diastolic CHF/HFpEF  -- Chronic Diastolic CHF/HFpEF  -- Other - I will add my own diagnosis  -- Disagree - Not applicable / Not valid  -- Disagree - Clinically unable to determine / Unknown  -- Refer to Clinical Documentation Reviewer    PROVIDER RESPONSE TEXT:    This patient is in acute on chronic diastolic CHF/HFpEF.    Query created by: Mary Page on 3/6/2024 11:32 AM      Electronically signed by:  Yenni Bustos MD 3/6/2024 11:38 AM          
4 Eyes Skin Assessment     NAME:  Scott De Luna  YOB: 1970  MEDICAL RECORD NUMBER:  [id number]    The patient is being assessed for  Admission    I agree that at least one RN has performed a thorough Head to Toe Skin Assessment on the patient. ALL assessment sites listed below have been assessed.      Areas assessed by both nurses:    Head, Face, Ears, Shoulders, Back, Chest, Arms, Elbows, Hands, Sacrum. Buttock, Coccyx, Ischium, and Legs. Feet and Heels        Does the Patient have a Wound? No noted wound(s)       Bryce Prevention initiated by RN: Yes  Wound Care Orders initiated by RN: No    Pressure Injury (Stage 3,4, Unstageable, DTI, NWPT, and Complex wounds) if present, place Wound referral order by RN under : No    New Ostomies, if present place, Ostomy referral order under : No     Nurse 1 eSignature: Electronically signed by Genesis Potter RN on 3/4/24 at 1:43 AM EST    **SHARE this note so that the co-signing nurse can place an eSignature**    Nurse 2 eSignature: Electronically signed by Kelly Saunders RN on 3/4/24 at 1:44 AM EST  
BMP results K 4.1, call placed to Dr Loredo in regards to K protocol. Patient received 3 of 6  bags. OK not to give other 3 bags at this time.  
CLINICAL PHARMACY NOTE: MEDS TO BEDS    Total # of Prescriptions Filled: 3   The following medications were delivered to the patient:  Nicotine patch  Prednisone 10 mg  Bumetanide 2 mg    Additional Documentation:    
Date: 3/3/2024    Time: 10:35 PM    Patient Placed On BIPAP/CPAP/ Non-Invasive Ventilation?  Yes    If no must comment.  Facial area red/color change? No           If YES are Blister/Lesion present?No   If yes must notify nursing staff  BIPAP/CPAP skin barrier?  Yes    Skin barrier type:mepilexlite       Comments:     03/03/24 3673   NIV Type   $NIV $Daily Charge   NIV Started/Stopped On   Equipment Type v60   Mode Bilevel   Mask Type Full face mask   Mask Size Medium   Assessment   Pulse 94   Respirations 20   SpO2 95 %   Comfort Level Good   Using Accessory Muscles Yes   Mask Compliance Good   Skin Assessment Clean, dry, & intact   Skin Protection for O2 Device Yes   Settings/Measurements   IPAP 12 cmH20   CPAP/EPAP 6 cmH2O   Vt (Measured) 685 mL   Insp Rise Time (%) 3 %   FiO2  60 %   I Time/ I Time % 0.9 s   Minute Volume (L/min) 11.5 Liters   Mask Leak (lpm) 46 lpm           Gissel Jefferson RCP  
Date: 3/4/2024    Time: 10:57 PM    Patient Placed On BIPAP/CPAP/ Non-Invasive Ventilation?  Yes    If no must comment.  Facial area red/color change? No           If YES are Blister/Lesion present?No   If yes must notify nursing staff  BIPAP/CPAP skin barrier?  Yes    Skin barrier type:mepilexlite       Comments: Machine plugged into red outlet and outside alarm plugged in.         Monalisa Salamanca EMILY    03/04/24 6071   NIV Type   NIV Started/Stopped On   Mode Bilevel   Mask Type Full face mask   Mask Size Medium   Assessment   Respirations 25   Comfort Level Good   Using Accessory Muscles No   Mask Compliance Good   Skin Assessment Clean, dry, & intact   Skin Protection for O2 Device Yes   Settings/Measurements   PIP Observed 9 cm H20   IPAP 12 cmH20   CPAP/EPAP 6 cmH2O   Vt (Measured) 950 mL   Rate Ordered 12   FiO2  50 %   Minute Volume (L/min) 12.1 Liters   Mask Leak (lpm) 32 lpm       
Nephrology Consult Note    Events reviewed.       SUBJECTIVE: We are following Scott De Luna for MODESTO.  Reports feeling much better today.    PHYSICAL EXAM:      Vitals:    VITALS:  /89   Pulse 62   Temp 97.9 °F (36.6 °C) (Oral)   Resp 18   Ht 1.829 m (6')   Wt 122.8 kg (270 lb 12.8 oz)   SpO2 (!) 89%   BMI 36.73 kg/m²   I/O last 3 completed shifts:  In: 300 [P.O.:300]  Out: 9575 [Urine:9575]  No intake/output data recorded.    Constitutional: normocephalic   HEENT: PERRLA  Respiratory: Decreased breath sounds at the bases  Cardiovascular/Edema:  S1, S2 present, regular  Gastrointestinal: active bowel sounds  Neurologic: alert and awake  Skin: warm and dry  Other: Trace edema    Scheduled Meds:   methylPREDNISolone  40 mg IntraVENous Q8H    vancomycin  1,750 mg IntraVENous Q12H    insulin glargine  10 Units SubCUTAneous Nightly    insulin lispro  5 Units SubCUTAneous TID WC    gabapentin  600 mg Oral TID    insulin lispro  0-8 Units SubCUTAneous TID WC    insulin lispro  0-4 Units SubCUTAneous Nightly    nicotine  1 patch TransDERmal Daily    sodium chloride flush  5-40 mL IntraVENous 2 times per day    sodium chloride flush  5-40 mL IntraVENous 2 times per day    enoxaparin  30 mg SubCUTAneous BID    famotidine  20 mg Oral Daily    ipratropium 0.5 mg-albuterol 2.5 mg  1 Dose Inhalation Q4H RT    piperacillin-tazobactam  4,500 mg IntraVENous q8h    buprenorphine-naloxone  1 tablet SubLINGual BID    budesonide  0.5 mg Nebulization BID RT    And    arformoterol tartrate  15 mcg Nebulization BID RT     Continuous Infusions:   bumetanide (BUMEX) 12.5 mg in sodium chloride 0.9 % 125 mL infusion 0.5 mg/hr (03/06/24 1109)    dextrose      sodium chloride       PRN Meds:.glucose, dextrose bolus **OR** dextrose bolus, glucagon (rDNA), dextrose, guaiFENesin, sodium chloride flush, sodium chloride flush, sodium chloride, potassium chloride **OR** potassium alternative oral replacement **OR** potassium chloride, 
Nephrology Consult Note    Events reviewed.       SUBJECTIVE: We are following Scott De Luna for MODESTO.  Reports shortness of breath.    PHYSICAL EXAM:      Vitals:    VITALS:  /73   Pulse 70   Temp 98 °F (36.7 °C) (Oral)   Resp 20   Ht 1.829 m (6')   Wt 115.8 kg (255 lb 3.2 oz)   SpO2 (!) 89%   BMI 34.61 kg/m²   I/O last 3 completed shifts:  In: 1589.5 [P.O.:180; IV Piggyback:1409.5]  Out: 63034 [Urine:38578]  I/O this shift:  In: 300 [P.O.:300]  Out: 800 [Urine:800]    Constitutional: normocephalic   HEENT: PERRLA  Respiratory: Decreased breath sounds at the bases  Cardiovascular/Edema:  S1, S2 present, regular  Gastrointestinal: active bowel sounds  Neurologic: alert and awake  Skin: warm and dry  Other: Trace edema    Scheduled Meds:   vancomycin  1,750 mg IntraVENous Q12H    insulin glargine  10 Units SubCUTAneous Nightly    insulin lispro  5 Units SubCUTAneous TID WC    gabapentin  600 mg Oral TID    insulin lispro  0-8 Units SubCUTAneous TID WC    insulin lispro  0-4 Units SubCUTAneous Nightly    nicotine  1 patch TransDERmal Daily    methylPREDNISolone  40 mg IntraVENous Q6H    sodium chloride flush  5-40 mL IntraVENous 2 times per day    sodium chloride flush  5-40 mL IntraVENous 2 times per day    enoxaparin  30 mg SubCUTAneous BID    famotidine  20 mg Oral Daily    ipratropium 0.5 mg-albuterol 2.5 mg  1 Dose Inhalation Q4H RT    piperacillin-tazobactam  4,500 mg IntraVENous q8h    buprenorphine-naloxone  1 tablet SubLINGual BID    budesonide  0.5 mg Nebulization BID RT    And    arformoterol tartrate  15 mcg Nebulization BID RT     Continuous Infusions:   bumetanide (BUMEX) 12.5 mg in sodium chloride 0.9 % 125 mL infusion 0.5 mg/hr (03/06/24 1109)    dextrose      sodium chloride       PRN Meds:.glucose, dextrose bolus **OR** dextrose bolus, glucagon (rDNA), dextrose, guaiFENesin, sodium chloride flush, sodium chloride flush, sodium chloride, potassium chloride **OR** potassium alternative 
Nephrology Progress Note    Events reviewed.       SUBJECTIVE: We are following Scott De Luna for MODESTO.  Reports feeling much better today.    PHYSICAL EXAM:      Vitals:    VITALS:  /66   Pulse 65   Temp 98.1 °F (36.7 °C) (Oral)   Resp 18   Ht 1.829 m (6')   Wt 122.8 kg (270 lb 12.8 oz)   SpO2 93%   BMI 36.73 kg/m²   I/O last 3 completed shifts:  In: -   Out: 7275 [Urine:7275]  I/O this shift:  In: -   Out: 1100 [Urine:1100]    Constitutional: normocephalic   HEENT: PERRLA  Respiratory: Decreased breath sounds at the bases  Cardiovascular/Edema:  S1, S2 present, regular  Gastrointestinal: active bowel sounds  Neurologic: alert and awake  Skin: warm and dry  Other: Trace edema    Scheduled Meds:   methylPREDNISolone  40 mg IntraVENous Q12H    insulin glargine  10 Units SubCUTAneous Nightly    insulin lispro  5 Units SubCUTAneous TID WC    gabapentin  600 mg Oral TID    insulin lispro  0-8 Units SubCUTAneous TID WC    insulin lispro  0-4 Units SubCUTAneous Nightly    nicotine  1 patch TransDERmal Daily    sodium chloride flush  5-40 mL IntraVENous 2 times per day    sodium chloride flush  5-40 mL IntraVENous 2 times per day    enoxaparin  30 mg SubCUTAneous BID    famotidine  20 mg Oral Daily    ipratropium 0.5 mg-albuterol 2.5 mg  1 Dose Inhalation Q4H RT    piperacillin-tazobactam  4,500 mg IntraVENous q8h    buprenorphine-naloxone  1 tablet SubLINGual BID    budesonide  0.5 mg Nebulization BID RT    And    arformoterol tartrate  15 mcg Nebulization BID RT     Continuous Infusions:   bumetanide (BUMEX) 12.5 mg in sodium chloride 0.9 % 125 mL infusion 0.5 mg/hr (03/08/24 1035)    dextrose      sodium chloride       PRN Meds:.glucose, dextrose bolus **OR** dextrose bolus, glucagon (rDNA), dextrose, guaiFENesin, sodium chloride flush, sodium chloride flush, sodium chloride, potassium chloride **OR** potassium alternative oral replacement **OR** potassium chloride, magnesium sulfate, ondansetron **OR** 
Nephrology Progress Note    Events reviewed.       SUBJECTIVE: We are following Scott De Luna for MODESTO.  Reports feeling much better today.    PHYSICAL EXAM:      Vitals:    VITALS:  /89   Pulse 70   Temp 98.5 °F (36.9 °C) (Oral)   Resp 18   Ht 1.829 m (6')   Wt 114.4 kg (252 lb 4.8 oz)   SpO2 93%   BMI 34.22 kg/m²   I/O last 3 completed shifts:  In: -   Out: 2700 [Urine:2700]  No intake/output data recorded.    Constitutional: normocephalic   HEENT: PERRLA  Respiratory: Decreased breath sounds at the bases  Cardiovascular/Edema:  S1, S2 present, regular  Gastrointestinal: active bowel sounds  Neurologic: alert and awake  Skin: warm and dry  Other: Trace edema    Scheduled Meds:   potassium chloride  20 mEq Oral TID WC    insulin glargine  20 Units SubCUTAneous Nightly    bumetanide  2 mg Oral BID    predniSONE  40 mg Oral Daily    insulin lispro  5 Units SubCUTAneous TID WC    gabapentin  600 mg Oral TID    insulin lispro  0-8 Units SubCUTAneous TID WC    insulin lispro  0-4 Units SubCUTAneous Nightly    nicotine  1 patch TransDERmal Daily    sodium chloride flush  5-40 mL IntraVENous 2 times per day    sodium chloride flush  5-40 mL IntraVENous 2 times per day    enoxaparin  30 mg SubCUTAneous BID    famotidine  20 mg Oral Daily    ipratropium 0.5 mg-albuterol 2.5 mg  1 Dose Inhalation Q4H RT    buprenorphine-naloxone  1 tablet SubLINGual BID    budesonide  0.5 mg Nebulization BID RT    And    arformoterol tartrate  15 mcg Nebulization BID RT     Continuous Infusions:   dextrose      sodium chloride 80 mL/hr at 03/11/24 1336     PRN Meds:.Glucose, dextrose bolus **OR** dextrose bolus, glucagon (rDNA), dextrose, guaiFENesin, sodium chloride flush, sodium chloride flush, sodium chloride, potassium chloride **OR** potassium alternative oral replacement **OR** potassium chloride, magnesium sulfate, ondansetron **OR** ondansetron, polyethylene glycol, acetaminophen **OR** acetaminophen     DATA:    CBC 
New consult sent to Dr Yarbrough via [employer] message.  
Nutrition Assessment    Type and Reason for Visit:  Initial, RD Nutrition Re-Screen/LOS    Nutrition Recommendations/Plan:     Recommend add (5 Carb/meal) Carbohydrate-Controlled diet d/t current hyperglycemia while on steroids and Hgb A1c 6%    Continue inpatient monitoring         Nutrition Assessment:    Pt admit 2/2 acute resp failure w/hypoxia, MODESTO and HFpEF 60%. Pertinent hx=COPD, hep C, substance abuse, smoking. He is eating well on Regular diet. Note hyperglycemia while on steroids. Recommend consider CHO control (5 carb/meal) added d/t hyperglycemia exac by steroids and Hgb A1c 6%.    Nutrition Related Findings:    Hyperglycemia (240,204, 337), A&Ox4, +1-+2 gen edema, -I/O 18L, round soft abd +BS, Hgb A1c 6.0% (3/4) Wound Type: None       Current Nutrition Intake & Therapies:    Average Meal Intake: %  Average Supplements Intake: None Ordered  ADULT DIET; Regular    Anthropometric Measures:  Height: 182.9 cm (6')  Ideal Body Weight (IBW): 178 lbs (81 kg)    Admission Body Weight: 115.8 kg (255 lb 4.7 oz) (3/7 bedscale)  Current Body Weight: 122.8 kg (270 lb 11.6 oz), 152.1 % IBW. Weight Source: Bed Scale (3/8)  Current BMI (kg/m2): 36.7  Usual Body Weight: 112 kg (247 lb) (2/28/2023 at )  % Weight Change (Calculated): 9.6                    BMI Categories: Obese Class 2 (BMI 35.0 -39.9)      Nutrition Diagnosis:   Altered nutrition-related lab values related to endocrine dysfuntion as evidenced by lab values    Nutrition Interventions:   Food and/or Nutrient Delivery: Continue Current Diet (Recommend CHO controlled diet restriction (5 carb/meal) added)  Nutrition Education/Counseling: No recommendation at this time  Coordination of Nutrition Care: Continue to monitor while inpatient       Goals:     Goals: PO intake 75% or greater, by next RD assessment       Nutrition Monitoring and Evaluation:   Behavioral-Environmental Outcomes: None Identified  Food/Nutrient Intake Outcomes: Food and Nutrient 
Per lab, unable to perform venous pH test until Monday d/t equipment maintenance  
Pharmacy Consultation Note  (Antibiotic Dosing and Monitoring)    Initial consult date: 03/04/2024  Consulting physician/provider: Yenni Bustos  Drug: Vancomycin  Indication: HAP    Age/  Gender Height Weight IBW  Allergy Information   53 y.o./male 182.9 cm (6') 108.9 kg (240 lb)     Ideal body weight: 77.6 kg (171 lb 1.2 oz)  Adjusted ideal body weight: 90.1 kg (198 lb 10.3 oz)   Patient has no known allergies.      Renal Function:  Recent Labs     03/03/24  1859 03/04/24  1046   BUN 27* 29*   CREATININE 2.5* 1.0         Intake/Output Summary (Last 24 hours) at 3/4/2024 1300  Last data filed at 3/4/2024 0921  Gross per 24 hour   Intake 258 ml   Output 1400 ml   Net -1142 ml         Vancomycin Monitoring:  Trough:  No results for input(s): \"VANCOTROUGH\" in the last 72 hours.  Random:  No results for input(s): \"VANCORANDOM\" in the last 72 hours.    Vancomycin Administration Times:  Recent vancomycin administrations                     vancomycin (VANCOCIN) 2000 mg in 0.9% sodium chloride 500 mL IVPB (mg) 2,000 mg New Bag 03/03/24 2316                    Assessment:  Patient is a 53 y.o. male who has been initiated on vancomycin  Estimated Creatinine Clearance: 106 mL/min (based on SCr of 1 mg/dL).  Patient received vancomycin 2000 mg IV x 1 loading dose on 3/3 (2316).  Serum creatinine was 2.5 mg/dL yesterday, but is now 1.0 mg/dL today, although documented urinary output is less than 1 mL/kg/hr.  Patient does have vancomycin level history in EPIC, but from 2014, thus will empirically dose vancomycin.     Plan:  Will place standing order for vancomycin 1250 mg IV every 12 hours to begin now.  Vancomycin level ordered with morning labs on 3/5.  Will assess vancomycin level on 3/5 ot determine future vancomycin dosing plans.   Will continue to monitor renal function   Pharmacy to follow      Nic Monzon RPH 3/4/2024 1:00 PM    SEB: [phone]  SEY: [phone]  SJW: [phone]    
Pharmacy Consultation Note  (Antibiotic Dosing and Monitoring)    Initial consult date: 03/04/2024  Consulting physician/provider: Yenni Bustos  Drug: Vancomycin  Indication: HAP    Age/  Gender Height Weight IBW  Allergy Information   53 y.o./male 182.9 cm (6') 108.9 kg (240 lb)     Ideal body weight: 77.6 kg (171 lb 1.2 oz)  Adjusted ideal body weight: 90.1 kg (198 lb 10.3 oz)   Patient has no known allergies.      Renal Function:  Recent Labs     03/03/24  1859 03/04/24  1046   BUN 27* 29*   CREATININE 2.5* 1.0       Intake/Output Summary (Last 24 hours) at 3/4/2024 1131  Last data filed at 3/4/2024 0921  Gross per 24 hour   Intake 258 ml   Output 1400 ml   Net -1142 ml       Vancomycin Monitoring:  Trough:  No results for input(s): \"VANCOTROUGH\" in the last 72 hours.  Random:  No results for input(s): \"VANCORANDOM\" in the last 72 hours.    Vancomycin Administration Times:  Recent vancomycin administrations                     vancomycin (VANCOCIN) 2000 mg in 0.9% sodium chloride 500 mL IVPB (mg) 2,000 mg New Bag 03/03/24 1023                    Assessment:  Patient is a 53 y.o. male who has been initiated on vancomycin  Estimated Creatinine Clearance: 106 mL/min (based on SCr of 1 mg/dL).  To dose vancomycin, pharmacy will be utilizing [employer] calculation software for goal AUC/TENA 400-600 mg/L-hr (predicted AUC/TENA = 472, Tr = 17 mcg/mL)    Plan:  Will continue vancomycin 500 mg IV every 12 hours  Will check vancomycin levels when appropriate  Will continue to monitor renal function   Pharmacy to follow      tianna bazzi RPH 3/4/2024 11:31 AM    SEB: [phone]  SEY: [phone]  SJW: [phone]    
Pharmacy Consultation Note  (Antibiotic Dosing and Monitoring)    Initial consult date: 03/04/2024  Consulting physician/provider: Yenni Bustos  Drug: Vancomycin  Indication: HAP    Age/  Gender Height Weight IBW  Allergy Information   53 y.o./male 182.9 cm (6') 108.9 kg (240 lb)     Ideal body weight: 77.6 kg (171 lb 1.2 oz)  Adjusted ideal body weight: 90.1 kg (198 lb 10.3 oz)   Patient has no known allergies.      Renal Function:  Recent Labs     03/03/24  1859 03/04/24  1046 03/05/24  0300   BUN 27* 29* 24*   CREATININE 2.5* 1.0 0.8         Intake/Output Summary (Last 24 hours) at 3/5/2024 0928  Last data filed at 3/5/2024 0917  Gross per 24 hour   Intake 1680 ml   Output 3575 ml   Net -1895 ml         Vancomycin Monitoring:  Trough:  No results for input(s): \"VANCOTROUGH\" in the last 72 hours.  Random:    Recent Labs     03/05/24  0300   VANCORANDOM 9.9       Assessment:  Patient is a 53 y.o. male who has been initiated on vancomycin  Estimated Creatinine Clearance: 133 mL/min (based on SCr of 0.8 mg/dL).  Vancomycin level was 9.9 mcg/mL nearly 3 hours prior to 3rd overall dose which correlates to AUC/TENA less than 400.     Plan:  Will adjust time and order for vancomycin to 1750 mg IV every 12 hours to begin today at 1500.  Will order vancomycin levels when appropriate.  Will continue to monitor renal function   Pharmacy to follow      Nic Monzon RPH 3/5/2024 9:28 AM    NILE: [phone]  SEY: [phone]  SJW: [phone]    
Pharmacy Consultation Note  (Antibiotic Dosing and Monitoring)    Initial consult date: 03/04/2024  Consulting physician/provider: Yenni Bustos  Drug: Vancomycin  Indication: HAP    Age/  Gender Height Weight IBW  Allergy Information   53 y.o./male 182.9 cm (6') 108.9 kg (240 lb)     Ideal body weight: 77.6 kg (171 lb 1.2 oz)  Adjusted ideal body weight: 90.1 kg (198 lb 10.3 oz)   Patient has no known allergies.      Renal Function:  Recent Labs     03/04/24  1046 03/05/24  0300 03/06/24  0532   BUN 29* 24* 22*   CREATININE 1.0 0.8 0.7         Intake/Output Summary (Last 24 hours) at 3/6/2024 0929  Last data filed at 3/6/2024 0652  Gross per 24 hour   Intake 1409.46 ml   Output 3300 ml   Net -1890.54 ml         Vancomycin Monitoring:  Trough:  No results for input(s): \"VANCOTROUGH\" in the last 72 hours.  Random:    Recent Labs     03/05/24  0300   VANCORANDOM 9.9         Assessment:  Patient is a 53 y.o. male who has been initiated on vancomycin  Estimated Creatinine Clearance: 152 mL/min (based on SCr of 0.7 mg/dL).  Vancomycin was re-timed and resumed on 3/5 at 2100.   Creatinine clearance stable.    Plan:  Will continue vancomycin 1750 mg IV every 12 hours.  Vancomycin level ordered with morning labs on 3/7.  PLEASE HOLD VANCOMYCIN IF VANCOMYCIN LEVEL IS GREATER THAN 20 MCG/ML.  Will assess vancomycin level on 3/7 to determine future vancomycin dosing plans.   Will continue to monitor renal function   Pharmacy to follow      Nic Monzon RPH 3/6/2024 9:29 AM    NILE: [phone]  SEY: [phone]  SJW: [phone]    
Pharmacy Consultation Note  (Antibiotic Dosing and Monitoring)    Initial consult date: 03/04/2024  Consulting physician/provider: Yenni Bustos  Drug: Vancomycin  Indication: HAP    Age/  Gender Height Weight IBW  Allergy Information   53 y.o./male 182.9 cm (6') 108.9 kg (240 lb)     Ideal body weight: 77.6 kg (171 lb 1.2 oz)  Adjusted ideal body weight: 92.9 kg (204 lb 11.6 oz)   Patient has no known allergies.      Renal Function:  Recent Labs     03/05/24  0300 03/06/24  0532 03/06/24  1615   BUN 24* 22* 21*   CREATININE 0.8 0.7 0.7         Intake/Output Summary (Last 24 hours) at 3/7/2024 0919  Last data filed at 3/7/2024 0804  Gross per 24 hour   Intake 480 ml   Output 8800 ml   Net -8320 ml         Vancomycin Monitoring:  Trough:  No results for input(s): \"VANCOTROUGH\" in the last 72 hours.  Random:    Recent Labs     03/05/24  0300 03/07/24  0545   VANCORANDOM 9.9 14.6         Assessment:  Patient is a 53 y.o. male who has been initiated on vancomycin  Estimated Creatinine Clearance: 157 mL/min (based on SCr of 0.7 mg/dL).  Vancomycin level was 14.6 mcg/mL which correlates to AUC/TENA 400-600.    Plan:  Will continue vancomycin 1750 mg IV every 12 hours.  Will continue to monitor renal function   Pharmacy to follow      Nic Monzon RPH 3/7/2024 9:19 AM    NILE: [phone]  SEY: [phone]  SJW: [phone]    
Pharmacy Consultation Note  (Antibiotic Dosing and Monitoring)    Initial consult date: 03/04/2024  Consulting physician/provider: Yenni Bustos  Drug: Vancomycin  Indication: HAP    Age/  Gender Height Weight IBW  Allergy Information   53 y.o./male 182.9 cm (6') 108.9 kg (240 lb)     Ideal body weight: 77.6 kg (171 lb 1.2 oz)  Adjusted ideal body weight: 95.7 kg (210 lb 15.5 oz)   Patient has no known allergies.      Renal Function:  Recent Labs     03/07/24  1050 03/07/24  1850 03/08/24  0617   BUN 24* 27* 25*   CREATININE 0.7 0.9 0.7         Intake/Output Summary (Last 24 hours) at 3/8/2024 0953  Last data filed at 3/8/2024 0650  Gross per 24 hour   Intake --   Output 4475 ml   Net -4475 ml         Vancomycin Monitoring:  Trough:  No results for input(s): \"VANCOTROUGH\" in the last 72 hours.  Random:    Recent Labs     03/07/24  0545   VANCORANDOM 14.6         Assessment:  Patient is a 53 y.o. male who has been initiated on vancomycin  Estimated Creatinine Clearance: 161 mL/min (based on SCr of 0.7 mg/dL).  Vancomycin level was 14.6 mcg/mL which correlates to AUC/TENA 400-600 on 3/7.  Creatinine clearance stable.     Plan:  Will continue vancomycin 1750 mg IV every 12 hours.  Will continue to monitor renal function   Pharmacy to follow      Nic Monzon RPH 3/8/2024 9:53 AM    INLE: [phone]  SEY: [phone]  SJW: [phone]    
Pt refused to wear BiPAP any longer stating \"I can't wear this anymore.\" Pt was placed on 6L NC.  
SpO2 70% on 15L high flow NC when patient got up to urinate. Placed patient back on BIPAP and SpO2 90%  
SpO2 on 4LNC during vitals check 75-80%. Increased to 6LNC with minimal improvement. Placed patient on BIPAP at 50% FiO2 and SpO2 90%. Notified Ching Marquez NP of new hypoxia and order placed for IV lasix. Patient wanting to come off BIPAP frequently. Patient took off multiple times without assistance and SpO2 decreased as low as 65%.     ~0300 - SpO2 88% on 50% FiO2. Per Cihng Marquez NP - Increased FiO2 to 60% and patient educated on importance of wearing, now verbalizing understanding.  
Spoke with  Gudelia ZAVALA regarding pt's lactic acid of 6.4. Reviewed pt's previous lactic results and that pt received NS and LR bolus in the ER and has NS at 125 /hr with a repeat lactic acid at 0730. Per Gudelia see what 0730 lactic is.   
Unable to complete visit while rounding due to hospital staff addressing patient.  remains available for support.  
    Objective:    /77   Pulse 71   Temp 97.6 °F (36.4 °C) (Oral)   Resp 18   Ht 1.829 m (6')   Wt 122.8 kg (270 lb 12.8 oz)   SpO2 94%   BMI 36.73 kg/m²     General Appearance: alert and oriented to person, place and time and in no acute distress, 15L oxygen via N.[place]   Skin: warm and dry  Head: normocephalic and atraumatic  Eyes: pupils equal, round, and reactive to light, extraocular eye movements intact, conjunctivae normal  Neck: neck supple and non tender without mass   Pulmonary/Chest: clear to auscultation bilaterally- no wheezes, rales or rhonchi, normal air movement, no respiratory distress  Cardiovascular: normal rate, normal S1 and S2 and no carotid bruits  Abdomen: soft, non-tender, non-distended, normal bowel sounds, no masses or organomegaly  Extremities: no cyanosis, no clubbing and no edema  Neurologic: no cranial nerve deficit and speech normal        Recent Labs     03/07/24  1050 03/07/24  1850 03/08/24  0617    138 137   K 3.6 4.7 4.3   CL 96* 89* 89*   CO2 33* 38* 35*   BUN 24* 27* 25*   CREATININE 0.7 0.9 0.7   GLUCOSE 207* 276* 220*   CALCIUM 7.7* 8.9 9.2       Recent Labs     03/06/24  0532   WBC 16.3*   RBC 4.30   HGB 13.4   HCT 42.2   MCV 98.1   MCH 31.2   MCHC 31.8*   RDW 14.7      MPV 11.3       Micro:  No components found for: \"BC)\"    Radiology:   CT ABDOMEN PELVIS WO CONTRAST Additional Contrast? None    Result Date: 3/3/2024  EXAMINATION: CT OF THE CHEST WITHOUT CONTRAST; CT OF THE ABDOMEN AND PELVIS WITHOUT CONTRAST 3/3/2024 7:44 pm TECHNIQUE: CT of the chest was performed without the administration of intravenous contrast. Multiplanar reformatted images are provided for review. Automated exposure control, iterative reconstruction, and/or weight based adjustment of the mA/kV was utilized to reduce the radiation dose to as low as reasonably achievable.; CT of the abdomen and pelvis was performed without the administration of intravenous contrast. Multiplanar 
24 hour   Intake 1589.46 ml   Output 4600 ml   Net -3010.54 ml               IPAP: 12 cmH20  CPAP/EPAP: 6 cmH2O     CURRENT MEDS :  Scheduled Meds:   vancomycin  1,750 mg IntraVENous Q12H    insulin glargine  10 Units SubCUTAneous Nightly    insulin lispro  5 Units SubCUTAneous TID WC    gabapentin  600 mg Oral TID    insulin lispro  0-8 Units SubCUTAneous TID WC    insulin lispro  0-4 Units SubCUTAneous Nightly    nicotine  1 patch TransDERmal Daily    methylPREDNISolone  40 mg IntraVENous Q6H    sodium chloride flush  5-40 mL IntraVENous 2 times per day    sodium chloride flush  5-40 mL IntraVENous 2 times per day    enoxaparin  30 mg SubCUTAneous BID    famotidine  20 mg Oral Daily    ipratropium 0.5 mg-albuterol 2.5 mg  1 Dose Inhalation Q4H RT    piperacillin-tazobactam  4,500 mg IntraVENous q8h    buprenorphine-naloxone  1 tablet SubLINGual BID    budesonide  0.5 mg Nebulization BID RT    And    arformoterol tartrate  15 mcg Nebulization BID RT       Physical Exam:  General Appearance: appears comfortable in no acute distress.   HEENT: Normocephalic atraumatic without obvious abnormality   Neck: Lips, mucosa, and tongue normal.  Supple, symmetrical, trachea midline, no adenopathy;thyroid:  no enlargement/tenderness/nodules or JVD.  Lung: Breath sounds wheezy. Respirations   unlabored. Symmetrical expansion.  Heart: RRR, normal S1, S2. No MRG  Abdomen: Soft, NT, ND. BS present x 4 quadrants. No bruit or organomegaly.   Extremities: Pedal pulses 2+ symmetric b/l.  Extremities normal, no cyanosis, or clubbing. 1+ pitting edema, lower extremities   Musculokeletal: No joint swelling, no muscle tenderness. ROM normal in all joints of extremities.   Neurologic: Mental status: Alert and Oriented X3 .    Pertinent/ New Labs and Imaging Studies     Imaging personally reviewed:  CXR 3/3/24  1.  Multifocal lobular consolidative processes predominantly in the bilateral  lower lungs.  There also ill-defined opacities in the 
40 mg Oral Daily    insulin glargine  10 Units SubCUTAneous Nightly    insulin lispro  5 Units SubCUTAneous TID     gabapentin  600 mg Oral TID    insulin lispro  0-8 Units SubCUTAneous TID     insulin lispro  0-4 Units SubCUTAneous Nightly    nicotine  1 patch TransDERmal Daily    sodium chloride flush  5-40 mL IntraVENous 2 times per day    sodium chloride flush  5-40 mL IntraVENous 2 times per day    enoxaparin  30 mg SubCUTAneous BID    famotidine  20 mg Oral Daily    ipratropium 0.5 mg-albuterol 2.5 mg  1 Dose Inhalation Q4H RT    buprenorphine-naloxone  1 tablet SubLINGual BID    budesonide  0.5 mg Nebulization BID RT    And    arformoterol tartrate  15 mcg Nebulization BID RT       Physical Exam:  General Appearance: appears comfortable in no acute distress.   HEENT: Normocephalic atraumatic without obvious abnormality   Neck: Lips, mucosa, and tongue normal.  Supple, symmetrical, trachea midline, no adenopathy;thyroid:  no enlargement/tenderness/nodules or JVD.  Lung: Breath sounds CTA. Respirations   unlabored. Symmetrical expansion.  Heart: RRR, normal S1, S2. No MRG  Abdomen: Soft, NT, ND. BS present x 4 quadrants. No bruit or organomegaly.   Extremities: Pedal pulses 2+ symmetric b/l.  Extremities normal, no cyanosis, or clubbing. 1+ pitting edema, lower extremities   Musculokeletal: No joint swelling, no muscle tenderness. ROM normal in all joints of extremities.   Neurologic: Mental status: Alert and Oriented X3 .    Pertinent/ New Labs and Imaging Studies     Imaging personally reviewed:  CXR 3/11/24  Report pending        Chest CT 3/3/2024:  1.  Multifocal lobular consolidative processes predominantly in the bilateral  lower lungs.  There also ill-defined opacities in the bilateral upper lungs,  right middle lobe, and lingula.     (Imaging features most suggestive of changes related to an underlying  infectious/inflammatory process.  Follow-up to resolution is recommended to  exclude other 
normal  Neck: neck supple and non tender without mass   Pulmonary/Chest: clear to auscultation bilaterally- no wheezes, rales or rhonchi, normal air movement, no respiratory distress  Cardiovascular: normal rate, normal S1 and S2 and no carotid bruits  Abdomen: soft, non-tender, non-distended, normal bowel sounds, no masses or organomegaly  Extremities: no cyanosis, no clubbing and no edema  Neurologic: no cranial nerve deficit and speech normal        Recent Labs     03/03/24  1859 03/04/24  1046    134   K 4.5 4.3   CL 93* 100   CO2 26 23   BUN 27* 29*   CREATININE 2.5* 1.0   GLUCOSE 146* 223*   CALCIUM 9.4 8.5*       Recent Labs     03/03/24  1859 03/04/24  1046   WBC 25.5* 16.5*   RBC 4.73 4.23   HGB 14.9 13.2   HCT 47.2 41.7   MCV 99.8 98.6   MCH 31.5 31.2   MCHC 31.6* 31.7*   RDW 14.4 14.6    157   MPV 10.5 10.6       Micro:  No components found for: \"BC)\"    Radiology:   CT ABDOMEN PELVIS WO CONTRAST Additional Contrast? None    Result Date: 3/3/2024  EXAMINATION: CT OF THE CHEST WITHOUT CONTRAST; CT OF THE ABDOMEN AND PELVIS WITHOUT CONTRAST 3/3/2024 7:44 pm TECHNIQUE: CT of the chest was performed without the administration of intravenous contrast. Multiplanar reformatted images are provided for review. Automated exposure control, iterative reconstruction, and/or weight based adjustment of the mA/kV was utilized to reduce the radiation dose to as low as reasonably achievable.; CT of the abdomen and pelvis was performed without the administration of intravenous contrast. Multiplanar reformatted images are provided for review. Automated exposure control, iterative reconstruction, and/or weight based adjustment of the mA/kV was utilized to reduce the radiation dose to as low as reasonably achievable. COMPARISON: None HISTORY: ORDERING SYSTEM PROVIDED HISTORY: SOB TECHNOLOGIST PROVIDED HISTORY: Reason for exam:->SOB Decision Support Exception - unselect if not a suspected or confirmed emergency 
or pneumothorax.  Mild centrilobular and paraseptal  emphysematous changes in the lungs.     4.  Coronary artery disease and atherosclerotic disease.     5.  No acute intra-pathology seen.  No evidence of bowel obstruction or  inflammation.  The appendix is not seen.  Mild stool burden seen throughout  the colon.     6.  Mildly hydropic appearing gallbladder with cholelithiasis.  No signs of  gallbladder inflammation.     7.  Fatty infiltration of the pancreas.  The remainder of the study is as  above.    BRIEF SUMMARY OF INITIAL CONSULT:    Briefly Mr. Scott De Luna is a 54 yo male with a PMH of COPD, hepatitis C, and drug abuse, who presented to the ED with complaints of fever, chills, and SOB. ED work up revealed creatinine 2.5, reason for this consult. His home medications include Suboxone, lasix in the last 3-4 weeks due to edema.     IMPRESSION/RECOMMENDATIONS:      MODESTO stage I 2/2 intravascular volume depletion (diuretics) , creatinine on admission 2.5 mg/dL and has improved to 0.8 mg/dL, resolved.    Lactic acidosis 2/2 intravascular volume depletion, lactic is improved  ---------------------------------------------  PNA, on Zosyn  COPD  Drug abuse, on Suboxone   Obesity  Tobacco use, nicotine patch    Plan:  Monitor lactic acid level  Monitor renal function  Monitor blood pressure  Repeat pro-BNP tomorrow       Electronically signed by PERCY Johnson CNP on 3/5/2024 at 10:01 AM   
    ---------------------------------------------  PNA, on piperacillin-tazobactam, vancomycin  COPD, on methylprednisolone, DuoNebs, Pulmicort, Brovana  Drug abuse, on Suboxone   Obesity  Tobacco use, nicotine patch    Plan:    Discontinue Bumex drip to 0.2 mg/hour   Replace K  Start bumex 2 mg po bid  Replace potassium   Continue to monitor renal function  Strict I&O             Discussed with RN  Electronically signed by Diamond Norris MD on 3/11/2024 at 2:54 PM     
  Obesity  Tobacco use, nicotine patch    Plan:    Bumex 2 mg IV x 1 followed by Bumex drip at 0.5 mg/hour  Continue to monitor renal function        Electronically signed by Og Yarbrough MD on 3/6/2024 at 7:33 AM   
contraction alkalosis 2/2 diuresis  Hypokalemia, 2/2 diuresis, to replace     ---------------------------------------------  PNA, on piperacillin-tazobactam, vancomycin  COPD, on methylprednisolone, DuoNebs, Pulmicort, Brovana  Drug abuse, on Suboxone   Obesity  Tobacco use, nicotine patch    Plan:    Continue Bumex drip to 0.2 mg/hour   Replace potassium   Continue to monitor renal function  Strict I&O   Check portable cxr              Electronically signed by PERCY ALMONTE NP on 3/10/2024 at 10:20 AM   AGREE AS ANNOTATED  CASTILLO THOMAS MD  
hypertension.    Image quality is suboptimal. Contrast used: Definity.    Labs:  Lab Results   Component Value Date/Time    WBC 16.3 03/06/2024 05:32 AM    RBC 4.30 03/06/2024 05:32 AM    HGB 13.4 03/06/2024 05:32 AM    HCT 42.2 03/06/2024 05:32 AM    MCV 98.1 03/06/2024 05:32 AM    MCH 31.2 03/06/2024 05:32 AM    MCHC 31.8 03/06/2024 05:32 AM    RDW 14.7 03/06/2024 05:32 AM     03/06/2024 05:32 AM    MPV 11.3 03/06/2024 05:32 AM     Lab Results   Component Value Date/Time     03/10/2024 07:04 AM    K 3.1 03/10/2024 07:04 AM    CL 90 03/10/2024 07:04 AM    CO2 35 03/10/2024 07:04 AM    BUN 25 03/10/2024 07:04 AM    CREATININE 0.8 03/10/2024 07:04 AM    LABALBU 3.3 03/06/2024 05:32 AM    CALCIUM 8.8 03/10/2024 07:04 AM    GFRAA >60 04/21/2017 11:59 PM    LABGLOM >60 03/10/2024 07:04 AM     Lab Results   Component Value Date/Time    PROTIME 14.2 03/20/2014 04:25 AM    INR 1.4 03/20/2014 04:25 AM     No results for input(s): \"PROBNP\" in the last 72 hours.       Assessment:    Acute respiratory failure with hypoxia  Multilobular opacities  Mediastinal adenopathy  Generalized edema  History of hepatitis C  History of drug use, on suboxone, clean 9 years  COPD, emphysema on imaging  Sepsis with severe pneumonia  Lactic acidosis  Current smoker  Fluid volume overload      Plan:   Oxygen therapy as needed to keep saturations >94%, currently on 4 L HFNC.    Will require ambulatory oxygen testing prior to discharge  NIV in the form of BiPap 12/6, 60% at HS and PRN  Echo reviewed-60% EF, mild pHTN  Scheduled bronchodilators - Budesonide/brovana BID with DuoNebs q4h  Bumex infusion per Nephrology - dose decreased   Change to prednisone with taper   Completing antibiotics today  Nicotine patch for smoking cessation  Patient will need chest ct in 6 weeks with navigational protocol     Electronically signed by Alvina Santiago MD on 3/10/2024 at 12:42 PM        
making (>50%) related to this patient encounter.  The medications & laboratory data and imagery were discussed and adjusted where necessary. Key issues of the case were discussed among consultants.  Review of CNP documentation was conducted and revisions were made as appropriate. I agree with the above documented exam, problem list and plan of care with the following additions:    Patient with bilateral nodular infiltrates/multilobar pneumonia  We will continue Zosyn and vancomycin for now.  Will order If negative for MRSA can stop vancomycin.  Continue IV Solu-Medrol 40 mg every 6 hours  Continue bronchodilators  Patient will need a follow-up CT scan with navigational protocol in 6 weeks.    Laura Cruz MD         
weeks with navigational protocol       This plan of care was reviewed in collaboration with Dr. Cruz    Electronically signed by PERCY Tirado CNP on 3/7/2024 at 4:18 PM    This is confirmation that I have personally performed a substantial portion of medical decision making (>50%) related to this patient encounter.  The medications & laboratory data and imagery were discussed and adjusted where necessary. Key issues of the case were discussed among consultants.  Review of CNP documentation was conducted and revisions were made as appropriate. I agree with the above documented exam, problem list and plan of care .    Laura Cruz MD     
unenhanced pulmonary arterial system.  Heart chambers are not enlarged.  No pericardial effusion. Mild left coronary distribution coronary artery disease.  Normal course and appearance of the esophagus. Lungs/pleura: The airway is patent.  No endobronchial lesions.  Mild centrilobular and paraseptal emphysematous changes in the upper lungs. Multifocal opacities in the bilateral lungs most pronounced in the lower lungs.  There also scattered ill-defined opacities in the bilateral upper lungs, right middle lobe, and lingula.  No bronchiectasis. Soft Tissues/Bones: The soft tissue structures of the neck appear unremarkable.  No suspicious axillary lymph nodes.  The anterior and posterior chest wall is unremarkable.  Vertebral body height and alignment is maintained.  Multifocal endplate spondylosis. Abdomen/Pelvis: Organs: Normal appearance of the liver, gallbladder fossa, extrahepatic common bile duct,  spleen, and adrenal glands.  Hydropic appearing gallbladder with small partially calcified stones.  No evidence of gallbladder inflammation.  Fatty infiltration of the pancreas. Kidneys, ureters, bladder: No definite renal mass or cyst.  No hydronephrosis.  No obstructive uropathy.  Normal appearance of the bladder. Vasculature: Mild atherosclerotic plaque burden in the abdominal aorta and branch vessels.  No aneurysmal dilatation. GI/Bowel: There is a normal appearance of the stomach and the small bowel to include the terminal ileum.  The appendix is not clearly seen.  Normal appearance of the large bowel.  No evidence of bowel obstruction or inflammation.  Mild to moderate stool burden seen throughout the colon. Pelvis: No prostatomegaly. Peritoneum/Retroperitoneum: There is no free fluid or free intraperitoneal air.  No concerning mesenteric or retroperitoneal lymphadenopathy. Bones/Soft Tissues: Anterior abdominal wall appears intact.  No suspicious inguinal lymph nodes.  The perineum is unremarkable.  Moderate 
Hyperglycemia - on steroids,  A1c - 6.0, ISS, added basal & premeal insulin  10. Ac on chr diastolic CHF/HFpEF.  proBNP 3169> 1519, chest x-ray with worsening infiltrates, consistent with pulmonary edemaStarted on bumex gtt per nephrology.      DVT prophylaxis: Lovenox   3/6 desaturated from 6 to 15 L, now on NIV & bumex gtt.    NOTE: This report was transcribed using voice recognition software. Every effort was made to ensure accuracy; however, inadvertent computerized transcription errors may be present.  Electronically signed by Yenni Bustos MD on 3/7/2024 at 3:58 PM  
present.  Electronically signed by Yenni Bustos MD on 3/5/2024 at 6:10 PM    
steroids,  A1c - 6.0, ISS, added basal & premeal insulin  10. Ac on chr diastolic CHF/HFpEF.  proBNP 3169> 1519, chest x-ray with worsening infiltrates, consistent with pulmonary edemaStarted on bumex gtt per nephrology.      DVT prophylaxis: Lovenox      NOTE: This report was transcribed using voice recognition software. Every effort was made to ensure accuracy; however, inadvertent computerized transcription errors may be present.  Electronically signed by Yenni Bustos MD on 3/6/2024 at 12:27 PM

## 2024-03-12 NOTE — DISCHARGE SUMMARY
reconstruction, and/or weight based adjustment of the mA/kV was utilized to reduce the radiation dose to as low as reasonably achievable. COMPARISON: None HISTORY: ORDERING SYSTEM PROVIDED HISTORY: SOB TECHNOLOGIST PROVIDED HISTORY: Reason for exam:->SOB Decision Support Exception - unselect if not a suspected or confirmed emergency medical condition->Emergency Medical Condition (MA); ORDERING SYSTEM PROVIDED HISTORY: LLQ abdominal pain TECHNOLOGIST PROVIDED HISTORY: Reason for exam:->LLQ abdominal pain Additional Contrast?->None Decision Support Exception - unselect if not a suspected or confirmed emergency medical condition->Emergency Medical Condition (MA) FINDINGS: Chest: Mediastinum: There are several enlarged mediastinal and hilar lymph nodes. Reference precarinal lymph node series 304, image 43 measuring 15 mm in short axis.  Normal appearance of the thoracic aorta and unenhanced pulmonary arterial system.  Heart chambers are not enlarged.  No pericardial effusion. Mild left coronary distribution coronary artery disease.  Normal course and appearance of the esophagus. Lungs/pleura: The airway is patent.  No endobronchial lesions.  Mild centrilobular and paraseptal emphysematous changes in the upper lungs. Multifocal opacities in the bilateral lungs most pronounced in the lower lungs.  There also scattered ill-defined opacities in the bilateral upper lungs, right middle lobe, and lingula.  No bronchiectasis. Soft Tissues/Bones: The soft tissue structures of the neck appear unremarkable.  No suspicious axillary lymph nodes.  The anterior and posterior chest wall is unremarkable.  Vertebral body height and alignment is maintained.  Multifocal endplate spondylosis. Abdomen/Pelvis: Organs: Normal appearance of the liver, gallbladder fossa, extrahepatic common bile duct,  spleen, and adrenal glands.  Hydropic appearing gallbladder with small partially calcified stones.  No evidence of gallbladder inflammation.

## 2024-05-23 ENCOUNTER — HOSPITAL ENCOUNTER (INPATIENT)
Age: 54
LOS: 3 days | Discharge: HOME | DRG: 193 | End: 2024-05-26
Attending: EMERGENCY MEDICINE | Admitting: INTERNAL MEDICINE
Payer: COMMERCIAL

## 2024-05-23 ENCOUNTER — APPOINTMENT (OUTPATIENT)
Dept: CT IMAGING | Age: 54
DRG: 193 | End: 2024-05-23
Payer: COMMERCIAL

## 2024-05-23 ENCOUNTER — APPOINTMENT (OUTPATIENT)
Dept: GENERAL RADIOLOGY | Age: 54
DRG: 193 | End: 2024-05-23
Payer: COMMERCIAL

## 2024-05-23 DIAGNOSIS — B34.8 RHINOVIRUS: ICD-10-CM

## 2024-05-23 DIAGNOSIS — E87.6 HYPOKALEMIA: ICD-10-CM

## 2024-05-23 DIAGNOSIS — J96.21 ACUTE ON CHRONIC RESPIRATORY FAILURE WITH HYPOXIA AND HYPERCAPNIA (HCC): ICD-10-CM

## 2024-05-23 DIAGNOSIS — J96.22 ACUTE ON CHRONIC RESPIRATORY FAILURE WITH HYPOXIA AND HYPERCAPNIA (HCC): ICD-10-CM

## 2024-05-23 DIAGNOSIS — J44.1 COPD EXACERBATION (HCC): Primary | ICD-10-CM

## 2024-05-23 PROBLEM — D72.829 INCREASED WHITE BLOOD CELL COUNT: Status: ACTIVE | Noted: 2024-05-23

## 2024-05-23 PROBLEM — J98.01 ACUTE BRONCHOSPASM: Status: ACTIVE | Noted: 2024-05-23

## 2024-05-23 LAB
ALBUMIN SERPL-MCNC: 3.7 G/DL (ref 3.5–5.2)
ALP SERPL-CCNC: 91 U/L (ref 40–129)
ALT SERPL-CCNC: 13 U/L (ref 0–40)
AMPHET UR QL SCN: NEGATIVE
ANION GAP SERPL CALCULATED.3IONS-SCNC: 10 MMOL/L (ref 7–16)
AST SERPL-CCNC: 17 U/L (ref 0–39)
B PARAP IS1001 DNA NPH QL NAA+NON-PROBE: NOT DETECTED
B PERT DNA SPEC QL NAA+PROBE: NOT DETECTED
B.E.: 10 MMOL/L (ref -3–3)
B.E.: 14.6 MMOL/L (ref -3–3)
BACTERIA URNS QL MICRO: ABNORMAL
BARBITURATES UR QL SCN: NEGATIVE
BASOPHILS # BLD: 0.05 K/UL (ref 0–0.2)
BASOPHILS NFR BLD: 0 % (ref 0–2)
BENZODIAZ UR QL: NEGATIVE
BILIRUB SERPL-MCNC: 0.6 MG/DL (ref 0–1.2)
BILIRUB UR QL STRIP: NEGATIVE
BNP SERPL-MCNC: 254 PG/ML (ref 0–125)
BUN SERPL-MCNC: 8 MG/DL (ref 6–20)
BUPRENORPHINE UR QL: POSITIVE
C PNEUM DNA NPH QL NAA+NON-PROBE: NOT DETECTED
CALCIUM SERPL-MCNC: 9 MG/DL (ref 8.6–10.2)
CANNABINOIDS UR QL SCN: NEGATIVE
CHLORIDE SERPL-SCNC: 85 MMOL/L (ref 98–107)
CLARITY UR: CLEAR
CO2 SERPL-SCNC: 39 MMOL/L (ref 22–29)
COCAINE UR QL SCN: NEGATIVE
COHB: 2.2 % (ref 0–1.5)
COHB: 3.3 % (ref 0–1.5)
COLOR UR: YELLOW
CREAT SERPL-MCNC: 0.7 MG/DL (ref 0.7–1.2)
CRITICAL: ABNORMAL
CRITICAL: ABNORMAL
CRP SERPL HS-MCNC: 241 MG/L (ref 0–5)
D-DIMER QUANTITATIVE: 206 NG/ML DDU (ref 0–230)
DATE ANALYZED: ABNORMAL
DATE ANALYZED: ABNORMAL
DATE OF COLLECTION: ABNORMAL
DATE OF COLLECTION: ABNORMAL
EKG ATRIAL RATE: 86 BPM
EKG P AXIS: 60 DEGREES
EKG P-R INTERVAL: 134 MS
EKG Q-T INTERVAL: 432 MS
EKG QRS DURATION: 120 MS
EKG QTC CALCULATION (BAZETT): 516 MS
EKG R AXIS: 32 DEGREES
EKG T AXIS: 53 DEGREES
EKG VENTRICULAR RATE: 86 BPM
EOSINOPHIL # BLD: 0.07 K/UL (ref 0.05–0.5)
EOSINOPHILS RELATIVE PERCENT: 0 % (ref 0–6)
ERYTHROCYTE [DISTWIDTH] IN BLOOD BY AUTOMATED COUNT: 15.3 % (ref 11.5–15)
ERYTHROCYTE [SEDIMENTATION RATE] IN BLOOD BY WESTERGREN METHOD: 25 MM/HR (ref 0–15)
FENTANYL UR QL: NEGATIVE
FIO2: 50 %
FIO2: 50 %
FLUAV RNA NPH QL NAA+NON-PROBE: NOT DETECTED
FLUBV RNA NPH QL NAA+NON-PROBE: NOT DETECTED
GFR, ESTIMATED: >90 ML/MIN/1.73M2
GLUCOSE SERPL-MCNC: 300 MG/DL (ref 74–99)
GLUCOSE UR STRIP-MCNC: 500 MG/DL
HADV DNA NPH QL NAA+NON-PROBE: NOT DETECTED
HCO3: 36.5 MMOL/L (ref 22–26)
HCO3: 40.9 MMOL/L (ref 22–26)
HCOV 229E RNA NPH QL NAA+NON-PROBE: NOT DETECTED
HCOV HKU1 RNA NPH QL NAA+NON-PROBE: NOT DETECTED
HCOV NL63 RNA NPH QL NAA+NON-PROBE: NOT DETECTED
HCOV OC43 RNA NPH QL NAA+NON-PROBE: NOT DETECTED
HCT VFR BLD AUTO: 43.5 % (ref 37–54)
HGB BLD-MCNC: 13.9 G/DL (ref 12.5–16.5)
HGB UR QL STRIP.AUTO: NEGATIVE
HHB: 2.4 % (ref 0–5)
HHB: 2.8 % (ref 0–5)
HMPV RNA NPH QL NAA+NON-PROBE: NOT DETECTED
HPIV1 RNA NPH QL NAA+NON-PROBE: NOT DETECTED
HPIV2 RNA NPH QL NAA+NON-PROBE: NOT DETECTED
HPIV3 RNA NPH QL NAA+NON-PROBE: NOT DETECTED
HPIV4 RNA NPH QL NAA+NON-PROBE: NOT DETECTED
IMM GRANULOCYTES # BLD AUTO: 0.18 K/UL (ref 0–0.58)
IMM GRANULOCYTES NFR BLD: 1 % (ref 0–5)
KETONES UR STRIP-MCNC: NEGATIVE MG/DL
LAB: ABNORMAL
LAB: ABNORMAL
LACTATE BLDV-SCNC: 1.6 MMOL/L (ref 0.5–2.2)
LACTATE BLDV-SCNC: 2.7 MMOL/L (ref 0.5–2.2)
LEUKOCYTE ESTERASE UR QL STRIP: NEGATIVE
LYMPHOCYTES NFR BLD: 2.08 K/UL (ref 1.5–4)
LYMPHOCYTES RELATIVE PERCENT: 11 % (ref 20–42)
Lab: 1445
Lab: 1729
M PNEUMO DNA NPH QL NAA+NON-PROBE: NOT DETECTED
MAGNESIUM SERPL-MCNC: 1.9 MG/DL (ref 1.6–2.6)
MCH RBC QN AUTO: 31.4 PG (ref 26–35)
MCHC RBC AUTO-ENTMCNC: 32 G/DL (ref 32–34.5)
MCV RBC AUTO: 98.2 FL (ref 80–99.9)
METHADONE UR QL: NEGATIVE
METHB: 0.3 % (ref 0–1.5)
METHB: 0.3 % (ref 0–1.5)
MODE: ABNORMAL
MODE: ABNORMAL
MONOCYTES NFR BLD: 0.87 K/UL (ref 0.1–0.95)
MONOCYTES NFR BLD: 4 % (ref 2–12)
NEUTROPHILS NFR BLD: 84 % (ref 43–80)
NEUTS SEG NFR BLD: 16.47 K/UL (ref 1.8–7.3)
NITRITE UR QL STRIP: NEGATIVE
O2 CONTENT: 19.6 ML/DL
O2 CONTENT: 19.8 ML/DL
O2 SATURATION: 97.1 % (ref 92–98.5)
O2 SATURATION: 97.5 % (ref 92–98.5)
O2HB: 94 % (ref 94–97)
O2HB: 94.7 % (ref 94–97)
OPERATOR ID: 166
OPERATOR ID: 316
OPIATES UR QL SCN: NEGATIVE
OXYCODONE UR QL SCN: NEGATIVE
PATIENT TEMP: 37 C
PATIENT TEMP: 37 C
PCO2: 55.7 MMHG (ref 35–45)
PCO2: 56.1 MMHG (ref 35–45)
PCP UR QL SCN: NEGATIVE
PEEP/CPAP: 5 CMH2O
PEEP/CPAP: 5 CMH2O
PFO2: 1.8 MMHG/%
PFO2: 1.82 MMHG/%
PH BLOOD GAS: 7.43 (ref 7.35–7.45)
PH BLOOD GAS: 7.48 (ref 7.35–7.45)
PH UR STRIP: 7 [PH] (ref 5–9)
PIP: 15 CMH2O
PLATELET # BLD AUTO: 268 K/UL (ref 130–450)
PMV BLD AUTO: 10.6 FL (ref 7–12)
PO2: 90.1 MMHG (ref 75–100)
PO2: 90.9 MMHG (ref 75–100)
POTASSIUM SERPL-SCNC: 2.4 MMOL/L (ref 3.5–5)
PROCALCITONIN SERPL-MCNC: 0.1 NG/ML (ref 0–0.08)
PROT SERPL-MCNC: 7.8 G/DL (ref 6.4–8.3)
PROT UR STRIP-MCNC: ABNORMAL MG/DL
PS: 15 CMH20
RBC # BLD AUTO: 4.43 M/UL (ref 3.8–5.8)
RBC #/AREA URNS HPF: ABNORMAL /HPF
RI(T): 2.23
RI(T): 2.26
RSV RNA NPH QL NAA+NON-PROBE: NOT DETECTED
RV+EV RNA NPH QL NAA+NON-PROBE: DETECTED
SARS-COV-2 RNA NPH QL NAA+NON-PROBE: NOT DETECTED
SODIUM SERPL-SCNC: 134 MMOL/L (ref 132–146)
SOURCE, BLOOD GAS: ABNORMAL
SOURCE, BLOOD GAS: ABNORMAL
SP GR UR STRIP: 1.01 (ref 1–1.03)
SPECIMEN DESCRIPTION: ABNORMAL
TEST INFORMATION: ABNORMAL
THB: 14.8 G/DL (ref 11.5–16.5)
THB: 14.8 G/DL (ref 11.5–16.5)
TIME ANALYZED: 1453
TIME ANALYZED: 1734
TROPONIN I SERPL HS-MCNC: 14 NG/L (ref 0–11)
UROBILINOGEN UR STRIP-ACNC: 1 EU/DL (ref 0–1)
WBC #/AREA URNS HPF: ABNORMAL /HPF
WBC OTHER # BLD: 19.7 K/UL (ref 4.5–11.5)

## 2024-05-23 PROCEDURE — 6370000000 HC RX 637 (ALT 250 FOR IP): Performed by: EMERGENCY MEDICINE

## 2024-05-23 PROCEDURE — 2700000000 HC OXYGEN THERAPY PER DAY

## 2024-05-23 PROCEDURE — 2580000003 HC RX 258

## 2024-05-23 PROCEDURE — 2580000003 HC RX 258: Performed by: EMERGENCY MEDICINE

## 2024-05-23 PROCEDURE — 83605 ASSAY OF LACTIC ACID: CPT

## 2024-05-23 PROCEDURE — 85652 RBC SED RATE AUTOMATED: CPT

## 2024-05-23 PROCEDURE — 80307 DRUG TEST PRSMV CHEM ANLYZR: CPT

## 2024-05-23 PROCEDURE — 6360000002 HC RX W HCPCS: Performed by: EMERGENCY MEDICINE

## 2024-05-23 PROCEDURE — 94660 CPAP INITIATION&MGMT: CPT

## 2024-05-23 PROCEDURE — 94664 DEMO&/EVAL PT USE INHALER: CPT

## 2024-05-23 PROCEDURE — 2500000003 HC RX 250 WO HCPCS: Performed by: EMERGENCY MEDICINE

## 2024-05-23 PROCEDURE — 87040 BLOOD CULTURE FOR BACTERIA: CPT

## 2024-05-23 PROCEDURE — 81001 URINALYSIS AUTO W/SCOPE: CPT

## 2024-05-23 PROCEDURE — 84145 PROCALCITONIN (PCT): CPT

## 2024-05-23 PROCEDURE — 82805 BLOOD GASES W/O2 SATURATION: CPT

## 2024-05-23 PROCEDURE — 99285 EMERGENCY DEPT VISIT HI MDM: CPT

## 2024-05-23 PROCEDURE — 80053 COMPREHEN METABOLIC PANEL: CPT

## 2024-05-23 PROCEDURE — 83880 ASSAY OF NATRIURETIC PEPTIDE: CPT

## 2024-05-23 PROCEDURE — 36600 WITHDRAWAL OF ARTERIAL BLOOD: CPT

## 2024-05-23 PROCEDURE — 87899 AGENT NOS ASSAY W/OPTIC: CPT

## 2024-05-23 PROCEDURE — 83735 ASSAY OF MAGNESIUM: CPT

## 2024-05-23 PROCEDURE — 93010 ELECTROCARDIOGRAM REPORT: CPT | Performed by: INTERNAL MEDICINE

## 2024-05-23 PROCEDURE — 84484 ASSAY OF TROPONIN QUANT: CPT

## 2024-05-23 PROCEDURE — 99223 1ST HOSP IP/OBS HIGH 75: CPT | Performed by: INTERNAL MEDICINE

## 2024-05-23 PROCEDURE — 5A09357 ASSISTANCE WITH RESPIRATORY VENTILATION, LESS THAN 24 CONSECUTIVE HOURS, CONTINUOUS POSITIVE AIRWAY PRESSURE: ICD-10-PCS | Performed by: STUDENT IN AN ORGANIZED HEALTH CARE EDUCATION/TRAINING PROGRAM

## 2024-05-23 PROCEDURE — 0202U NFCT DS 22 TRGT SARS-COV-2: CPT

## 2024-05-23 PROCEDURE — 87086 URINE CULTURE/COLONY COUNT: CPT

## 2024-05-23 PROCEDURE — 94640 AIRWAY INHALATION TREATMENT: CPT

## 2024-05-23 PROCEDURE — 2060000000 HC ICU INTERMEDIATE R&B

## 2024-05-23 PROCEDURE — 93005 ELECTROCARDIOGRAM TRACING: CPT

## 2024-05-23 PROCEDURE — 86140 C-REACTIVE PROTEIN: CPT

## 2024-05-23 PROCEDURE — 71250 CT THORAX DX C-: CPT

## 2024-05-23 PROCEDURE — 85379 FIBRIN DEGRADATION QUANT: CPT

## 2024-05-23 PROCEDURE — 87449 NOS EACH ORGANISM AG IA: CPT

## 2024-05-23 PROCEDURE — 96375 TX/PRO/DX INJ NEW DRUG ADDON: CPT

## 2024-05-23 PROCEDURE — 85025 COMPLETE CBC W/AUTO DIFF WBC: CPT

## 2024-05-23 PROCEDURE — 96374 THER/PROPH/DIAG INJ IV PUSH: CPT

## 2024-05-23 PROCEDURE — 71045 X-RAY EXAM CHEST 1 VIEW: CPT

## 2024-05-23 RX ORDER — METHYLPREDNISOLONE SODIUM SUCCINATE 40 MG/ML
40 INJECTION, POWDER, LYOPHILIZED, FOR SOLUTION INTRAMUSCULAR; INTRAVENOUS 2 TIMES DAILY
Status: DISCONTINUED | OUTPATIENT
Start: 2024-05-23 | End: 2024-05-24

## 2024-05-23 RX ORDER — LEVOFLOXACIN 5 MG/ML
500 INJECTION, SOLUTION INTRAVENOUS EVERY 24 HOURS
Status: DISCONTINUED | OUTPATIENT
Start: 2024-05-23 | End: 2024-05-26 | Stop reason: HOSPADM

## 2024-05-23 RX ORDER — POTASSIUM CHLORIDE 20 MEQ/1
40 TABLET, EXTENDED RELEASE ORAL
Status: COMPLETED | OUTPATIENT
Start: 2024-05-24 | End: 2024-05-24

## 2024-05-23 RX ORDER — IPRATROPIUM BROMIDE AND ALBUTEROL SULFATE 2.5; .5 MG/3ML; MG/3ML
1 SOLUTION RESPIRATORY (INHALATION)
Status: DISCONTINUED | OUTPATIENT
Start: 2024-05-24 | End: 2024-05-26 | Stop reason: HOSPADM

## 2024-05-23 RX ORDER — NICOTINE 21 MG/24HR
1 PATCH, TRANSDERMAL 24 HOURS TRANSDERMAL DAILY
Status: DISCONTINUED | OUTPATIENT
Start: 2024-05-24 | End: 2024-05-26 | Stop reason: HOSPADM

## 2024-05-23 RX ORDER — METHYLPREDNISOLONE SODIUM SUCCINATE 125 MG/2ML
125 INJECTION, POWDER, LYOPHILIZED, FOR SOLUTION INTRAMUSCULAR; INTRAVENOUS ONCE
Status: COMPLETED | OUTPATIENT
Start: 2024-05-23 | End: 2024-05-23

## 2024-05-23 RX ORDER — BUPRENORPHINE HYDROCHLORIDE AND NALOXONE HYDROCHLORIDE DIHYDRATE 8; 2 MG/1; MG/1
1 TABLET SUBLINGUAL 2 TIMES DAILY
Status: DISCONTINUED | OUTPATIENT
Start: 2024-05-23 | End: 2024-05-26 | Stop reason: HOSPADM

## 2024-05-23 RX ORDER — METHYLPREDNISOLONE SODIUM SUCCINATE 125 MG/2ML
80 INJECTION, POWDER, LYOPHILIZED, FOR SOLUTION INTRAMUSCULAR; INTRAVENOUS ONCE
Status: DISCONTINUED | OUTPATIENT
Start: 2024-05-23 | End: 2024-05-23

## 2024-05-23 RX ORDER — 0.9 % SODIUM CHLORIDE 0.9 %
500 INTRAVENOUS SOLUTION INTRAVENOUS ONCE
Status: COMPLETED | OUTPATIENT
Start: 2024-05-23 | End: 2024-05-23

## 2024-05-23 RX ORDER — IPRATROPIUM BROMIDE AND ALBUTEROL SULFATE 2.5; .5 MG/3ML; MG/3ML
3 SOLUTION RESPIRATORY (INHALATION) ONCE
Status: COMPLETED | OUTPATIENT
Start: 2024-05-23 | End: 2024-05-23

## 2024-05-23 RX ORDER — POTASSIUM CHLORIDE 7.45 MG/ML
10 INJECTION INTRAVENOUS
Status: DISPENSED | OUTPATIENT
Start: 2024-05-23 | End: 2024-05-23

## 2024-05-23 RX ORDER — POTASSIUM CHLORIDE 20 MEQ/1
40 TABLET, EXTENDED RELEASE ORAL ONCE
Status: COMPLETED | OUTPATIENT
Start: 2024-05-23 | End: 2024-05-23

## 2024-05-23 RX ORDER — IPRATROPIUM BROMIDE AND ALBUTEROL SULFATE 2.5; .5 MG/3ML; MG/3ML
1 SOLUTION RESPIRATORY (INHALATION)
Status: DISCONTINUED | OUTPATIENT
Start: 2024-05-23 | End: 2024-05-23

## 2024-05-23 RX ORDER — GABAPENTIN 300 MG/1
600 CAPSULE ORAL EVERY 8 HOURS PRN
Status: DISCONTINUED | OUTPATIENT
Start: 2024-05-23 | End: 2024-05-26 | Stop reason: HOSPADM

## 2024-05-23 RX ORDER — ALOGLIPTIN 12.5 MG/1
12.5 TABLET, FILM COATED ORAL DAILY
Status: DISCONTINUED | OUTPATIENT
Start: 2024-05-24 | End: 2024-05-26 | Stop reason: HOSPADM

## 2024-05-23 RX ADMIN — IPRATROPIUM BROMIDE AND ALBUTEROL SULFATE 3 DOSE: .5; 2.5 SOLUTION RESPIRATORY (INHALATION) at 14:27

## 2024-05-23 RX ADMIN — WATER 1000 MG: 1 INJECTION INTRAMUSCULAR; INTRAVENOUS; SUBCUTANEOUS at 22:21

## 2024-05-23 RX ADMIN — POTASSIUM CHLORIDE 10 MEQ: 7.46 INJECTION, SOLUTION INTRAVENOUS at 20:38

## 2024-05-23 RX ADMIN — DOXYCYCLINE 100 MG: 100 INJECTION, POWDER, LYOPHILIZED, FOR SOLUTION INTRAVENOUS at 22:25

## 2024-05-23 RX ADMIN — METHYLPREDNISOLONE SODIUM SUCCINATE 125 MG: 125 INJECTION INTRAMUSCULAR; INTRAVENOUS at 15:03

## 2024-05-23 RX ADMIN — SODIUM CHLORIDE 500 ML: 9 INJECTION, SOLUTION INTRAVENOUS at 16:06

## 2024-05-23 RX ADMIN — POTASSIUM CHLORIDE 40 MEQ: 1500 TABLET, EXTENDED RELEASE ORAL at 20:21

## 2024-05-23 NOTE — PROGRESS NOTES
05/23/24 1427   NIV Type   $NIV $Daily Charge   Ventilator ID [id number]   Suction Setup and Functional Yes   NIV Started/Stopped On   Equipment Type v60   Mode Bilevel   Mask Type Full face mask   Mask Size Medium   Assessment   Pulse 86   Heart Rate Source Monitor   Respirations 24   SpO2 97 %   Level of Consciousness 0   Comfort Level Good   Using Accessory Muscles Yes   Mask Compliance Good   Skin Assessment Clean, dry, & intact   Breath Sounds   Upper Airway Sounds Coarse   Breath Sounds Bilateral Expiratory wheezing   Right Upper Lobe Coarse crackles;Expiratory wheezes   Right Middle Lobe Expiratory wheezes   Right Lower Lobe Expiratory wheezes   Left Upper Lobe Coarse crackles;Expiratory wheezes   Left Lower Lobe Expiratory wheezes   Settings/Measurements   PIP Observed 17 cm H20   IPAP 15 cmH20   CPAP/EPAP 5 cmH2O   Vt (Measured) 493 mL   Rate Ordered 16   Insp Rise Time (%) 3 %   FiO2  50 %   I Time/ I Time % 0.75 s   Minute Volume (L/min) 13.4 Liters   Mask Leak (lpm) 35 lpm   Patient's Home Machine No   Alarm Settings   Alarms On Y   Low Pressure (cmH2O) 8 cmH2O   High Pressure (cmH2O) 26 cmH2O   RR Low (bpm) 16   RR High (bpm) 50 br/min

## 2024-05-23 NOTE — ED PROVIDER NOTES
Department of Emergency Medicine   ED  Provider Note    Pt Name: Scott De Luna         MRN: [id number]         Birthdate 1970    Admit Date/RoomTime: 5/23/2024  1:57 PM  ED Room: Formerly named [facility]      Chief Complaint   Patient presents with    Shortness of Breath     83% in triage          The history is provided by the patient and the spouse.        Scott De Luna is a 53 y.o. male with PMHx of COPD, pneumonia, heroin abuse, tobacco abuse, positive hepatitis C presenting to the ED for shortness of breath.  Per patient and patient's significant other, patient has not felt well since being discharged from the hospital back in March of this year.  Patient is prescribed oxygen at home which he does not wear.  Patient does have a history of COPD and utilizes albuterol inhalers.  Patient also utilizes Advair daily.  Patient noted that he felt sick for the last 2 weeks with URI symptoms and called his pharmacy to get a refill of his prednisone which he took for the past 2 weeks without improvement of his symptoms.  Patient has cough productive of green sputum.  Upon presentation to the emergency department patient was in mild to moderate respiratory distress had to be placed on nasal cannula, BiPAP was then ordered.    Suspect COPD exacerbation with acute hypoxic respiratory failure    Review of Systems   Constitutional:  Positive for chills, fatigue and fever. Negative for activity change and appetite change.   HENT:  Positive for congestion, postnasal drip and rhinorrhea.    Eyes:  Negative for visual disturbance.   Respiratory:  Positive for cough, chest tightness, shortness of breath and wheezing.    Cardiovascular:  Positive for chest pain and leg swelling. Negative for palpitations.   Gastrointestinal:  Negative for abdominal pain, blood in stool, constipation, diarrhea, nausea and vomiting.   Genitourinary:  Negative for dysuria and hematuria.   Musculoskeletal:  Negative for back pain and myalgias.   Skin:

## 2024-05-24 LAB
ALBUMIN SERPL-MCNC: 3.6 G/DL (ref 3.5–5.2)
ALP SERPL-CCNC: 101 U/L (ref 40–129)
ALT SERPL-CCNC: 13 U/L (ref 0–40)
ANION GAP SERPL CALCULATED.3IONS-SCNC: 11 MMOL/L (ref 7–16)
AST SERPL-CCNC: 17 U/L (ref 0–39)
BASOPHILS # BLD: 0.02 K/UL (ref 0–0.2)
BASOPHILS NFR BLD: 0 % (ref 0–2)
BILIRUB SERPL-MCNC: 0.3 MG/DL (ref 0–1.2)
BNP SERPL-MCNC: 296 PG/ML (ref 0–125)
BUN SERPL-MCNC: 11 MG/DL (ref 6–20)
CALCIUM SERPL-MCNC: 9.5 MG/DL (ref 8.6–10.2)
CHLORIDE SERPL-SCNC: 89 MMOL/L (ref 98–107)
CO2 SERPL-SCNC: 34 MMOL/L (ref 22–29)
CREAT SERPL-MCNC: 0.6 MG/DL (ref 0.7–1.2)
CRP SERPL HS-MCNC: 208 MG/L (ref 0–5)
EOSINOPHIL # BLD: 0 K/UL (ref 0.05–0.5)
EOSINOPHILS RELATIVE PERCENT: 0 % (ref 0–6)
ERYTHROCYTE [DISTWIDTH] IN BLOOD BY AUTOMATED COUNT: 15.1 % (ref 11.5–15)
GFR, ESTIMATED: >90 ML/MIN/1.73M2
GLUCOSE BLD-MCNC: 441 MG/DL (ref 74–99)
GLUCOSE BLD-MCNC: 468 MG/DL (ref 74–99)
GLUCOSE BLD-MCNC: >500 MG/DL (ref 74–99)
GLUCOSE SERPL-MCNC: 297 MG/DL (ref 74–99)
GLUCOSE SERPL-MCNC: 578 MG/DL (ref 74–99)
HBA1C MFR BLD: 7.3 % (ref 4–5.6)
HCT VFR BLD AUTO: 42.9 % (ref 37–54)
HGB BLD-MCNC: 13.6 G/DL (ref 12.5–16.5)
IMM GRANULOCYTES # BLD AUTO: 0.3 K/UL (ref 0–0.58)
IMM GRANULOCYTES NFR BLD: 1 % (ref 0–5)
L PNEUMO1 AG UR QL IA.RAPID: NEGATIVE
LYMPHOCYTES NFR BLD: 1.17 K/UL (ref 1.5–4)
LYMPHOCYTES RELATIVE PERCENT: 6 % (ref 20–42)
MCH RBC QN AUTO: 30.7 PG (ref 26–35)
MCHC RBC AUTO-ENTMCNC: 31.7 G/DL (ref 32–34.5)
MCV RBC AUTO: 96.8 FL (ref 80–99.9)
MONOCYTES NFR BLD: 0.5 K/UL (ref 0.1–0.95)
MONOCYTES NFR BLD: 2 % (ref 2–12)
NEUTROPHILS NFR BLD: 91 % (ref 43–80)
NEUTS SEG NFR BLD: 19.35 K/UL (ref 1.8–7.3)
PLATELET # BLD AUTO: 266 K/UL (ref 130–450)
PMV BLD AUTO: 10.6 FL (ref 7–12)
POTASSIUM SERPL-SCNC: 2.9 MMOL/L (ref 3.5–5)
POTASSIUM SERPL-SCNC: 3 MMOL/L (ref 3.5–5)
PROCALCITONIN SERPL-MCNC: 0.09 NG/ML (ref 0–0.08)
PROT SERPL-MCNC: 7.7 G/DL (ref 6.4–8.3)
RBC # BLD AUTO: 4.43 M/UL (ref 3.8–5.8)
S PNEUM AG SPEC QL: NEGATIVE
SODIUM SERPL-SCNC: 134 MMOL/L (ref 132–146)
SPECIMEN SOURCE: NORMAL
WBC OTHER # BLD: 21.3 K/UL (ref 4.5–11.5)

## 2024-05-24 PROCEDURE — 82962 GLUCOSE BLOOD TEST: CPT

## 2024-05-24 PROCEDURE — 6370000000 HC RX 637 (ALT 250 FOR IP): Performed by: INTERNAL MEDICINE

## 2024-05-24 PROCEDURE — 6370000000 HC RX 637 (ALT 250 FOR IP): Performed by: NURSE PRACTITIONER

## 2024-05-24 PROCEDURE — 83036 HEMOGLOBIN GLYCOSYLATED A1C: CPT

## 2024-05-24 PROCEDURE — 36415 COLL VENOUS BLD VENIPUNCTURE: CPT

## 2024-05-24 PROCEDURE — 83880 ASSAY OF NATRIURETIC PEPTIDE: CPT

## 2024-05-24 PROCEDURE — 99233 SBSQ HOSP IP/OBS HIGH 50: CPT | Performed by: STUDENT IN AN ORGANIZED HEALTH CARE EDUCATION/TRAINING PROGRAM

## 2024-05-24 PROCEDURE — 2700000000 HC OXYGEN THERAPY PER DAY

## 2024-05-24 PROCEDURE — 84145 PROCALCITONIN (PCT): CPT

## 2024-05-24 PROCEDURE — 85025 COMPLETE CBC W/AUTO DIFF WBC: CPT

## 2024-05-24 PROCEDURE — 82947 ASSAY GLUCOSE BLOOD QUANT: CPT

## 2024-05-24 PROCEDURE — 6360000002 HC RX W HCPCS: Performed by: INTERNAL MEDICINE

## 2024-05-24 PROCEDURE — 6370000000 HC RX 637 (ALT 250 FOR IP): Performed by: STUDENT IN AN ORGANIZED HEALTH CARE EDUCATION/TRAINING PROGRAM

## 2024-05-24 PROCEDURE — 2060000000 HC ICU INTERMEDIATE R&B

## 2024-05-24 PROCEDURE — 94660 CPAP INITIATION&MGMT: CPT

## 2024-05-24 PROCEDURE — 86140 C-REACTIVE PROTEIN: CPT

## 2024-05-24 PROCEDURE — 80053 COMPREHEN METABOLIC PANEL: CPT

## 2024-05-24 PROCEDURE — 94640 AIRWAY INHALATION TREATMENT: CPT

## 2024-05-24 PROCEDURE — 84132 ASSAY OF SERUM POTASSIUM: CPT

## 2024-05-24 PROCEDURE — 6360000002 HC RX W HCPCS: Performed by: STUDENT IN AN ORGANIZED HEALTH CARE EDUCATION/TRAINING PROGRAM

## 2024-05-24 RX ORDER — INSULIN LISPRO 100 [IU]/ML
0-4 INJECTION, SOLUTION INTRAVENOUS; SUBCUTANEOUS
Status: DISCONTINUED | OUTPATIENT
Start: 2024-05-24 | End: 2024-05-24

## 2024-05-24 RX ORDER — POTASSIUM CHLORIDE 20 MEQ/1
40 TABLET, EXTENDED RELEASE ORAL ONCE
Status: COMPLETED | OUTPATIENT
Start: 2024-05-24 | End: 2024-05-24

## 2024-05-24 RX ORDER — MAGNESIUM SULFATE IN WATER 40 MG/ML
2000 INJECTION, SOLUTION INTRAVENOUS ONCE
Status: DISCONTINUED | OUTPATIENT
Start: 2024-05-24 | End: 2024-05-24

## 2024-05-24 RX ORDER — LANOLIN ALCOHOL/MO/W.PET/CERES
400 CREAM (GRAM) TOPICAL ONCE
Status: DISCONTINUED | OUTPATIENT
Start: 2024-05-24 | End: 2024-05-24 | Stop reason: CLARIF

## 2024-05-24 RX ORDER — BUMETANIDE 1 MG/1
2 TABLET ORAL 2 TIMES DAILY
Status: DISCONTINUED | OUTPATIENT
Start: 2024-05-24 | End: 2024-05-26 | Stop reason: HOSPADM

## 2024-05-24 RX ORDER — METHYLPREDNISOLONE SODIUM SUCCINATE 40 MG/ML
40 INJECTION, POWDER, LYOPHILIZED, FOR SOLUTION INTRAMUSCULAR; INTRAVENOUS EVERY 6 HOURS
Status: DISCONTINUED | OUTPATIENT
Start: 2024-05-24 | End: 2024-05-25

## 2024-05-24 RX ORDER — INSULIN LISPRO 100 [IU]/ML
0-4 INJECTION, SOLUTION INTRAVENOUS; SUBCUTANEOUS
Status: DISCONTINUED | OUTPATIENT
Start: 2024-05-24 | End: 2024-05-25

## 2024-05-24 RX ORDER — INSULIN LISPRO 100 [IU]/ML
16 INJECTION, SOLUTION INTRAVENOUS; SUBCUTANEOUS ONCE
Status: COMPLETED | OUTPATIENT
Start: 2024-05-24 | End: 2024-05-24

## 2024-05-24 RX ORDER — LANOLIN ALCOHOL/MO/W.PET/CERES
400 CREAM (GRAM) TOPICAL DAILY
Status: DISCONTINUED | OUTPATIENT
Start: 2024-05-24 | End: 2024-05-26 | Stop reason: HOSPADM

## 2024-05-24 RX ORDER — INSULIN LISPRO 100 [IU]/ML
0-4 INJECTION, SOLUTION INTRAVENOUS; SUBCUTANEOUS NIGHTLY
Status: DISCONTINUED | OUTPATIENT
Start: 2024-05-24 | End: 2024-05-26 | Stop reason: HOSPADM

## 2024-05-24 RX ADMIN — INSULIN HUMAN 8 UNITS: 100 INJECTION, SOLUTION PARENTERAL at 00:14

## 2024-05-24 RX ADMIN — MAGNESIUM OXIDE 400 MG (241.3 MG MAGNESIUM) TABLET 400 MG: TABLET at 09:30

## 2024-05-24 RX ADMIN — POTASSIUM CHLORIDE 40 MEQ: 1500 TABLET, EXTENDED RELEASE ORAL at 06:09

## 2024-05-24 RX ADMIN — BUPRENORPHINE HYDROCHLORIDE AND NALOXONE HYDROCHLORIDE DIHYDRATE 1 TABLET: 8; 2 TABLET SUBLINGUAL at 08:04

## 2024-05-24 RX ADMIN — IPRATROPIUM BROMIDE AND ALBUTEROL SULFATE 1 DOSE: 2.5; .5 SOLUTION RESPIRATORY (INHALATION) at 08:16

## 2024-05-24 RX ADMIN — POTASSIUM CHLORIDE 40 MEQ: 1500 TABLET, EXTENDED RELEASE ORAL at 00:06

## 2024-05-24 RX ADMIN — GABAPENTIN 600 MG: 300 CAPSULE ORAL at 08:11

## 2024-05-24 RX ADMIN — IPRATROPIUM BROMIDE AND ALBUTEROL SULFATE 1 DOSE: 2.5; .5 SOLUTION RESPIRATORY (INHALATION) at 17:06

## 2024-05-24 RX ADMIN — METHYLPREDNISOLONE SODIUM SUCCINATE 40 MG: 40 INJECTION INTRAMUSCULAR; INTRAVENOUS at 08:04

## 2024-05-24 RX ADMIN — BUMETANIDE 2 MG: 1 TABLET ORAL at 18:26

## 2024-05-24 RX ADMIN — METHYLPREDNISOLONE SODIUM SUCCINATE 40 MG: 40 INJECTION INTRAMUSCULAR; INTRAVENOUS at 20:57

## 2024-05-24 RX ADMIN — IPRATROPIUM BROMIDE AND ALBUTEROL SULFATE 1 DOSE: 2.5; .5 SOLUTION RESPIRATORY (INHALATION) at 19:49

## 2024-05-24 RX ADMIN — GABAPENTIN 600 MG: 300 CAPSULE ORAL at 16:38

## 2024-05-24 RX ADMIN — INSULIN LISPRO 16 UNITS: 100 INJECTION, SOLUTION INTRAVENOUS; SUBCUTANEOUS at 22:04

## 2024-05-24 RX ADMIN — POTASSIUM CHLORIDE 40 MEQ: 1500 TABLET, EXTENDED RELEASE ORAL at 04:12

## 2024-05-24 RX ADMIN — BUMETANIDE 2 MG: 1 TABLET ORAL at 13:58

## 2024-05-24 RX ADMIN — INSULIN LISPRO 4 UNITS: 100 INJECTION, SOLUTION INTRAVENOUS; SUBCUTANEOUS at 16:38

## 2024-05-24 RX ADMIN — ALOGLIPTIN 12.5 MG: 12.5 TABLET, FILM COATED ORAL at 08:04

## 2024-05-24 RX ADMIN — BUPRENORPHINE HYDROCHLORIDE AND NALOXONE HYDROCHLORIDE DIHYDRATE 1 TABLET: 8; 2 TABLET SUBLINGUAL at 20:57

## 2024-05-24 RX ADMIN — POTASSIUM CHLORIDE 40 MEQ: 1500 TABLET, EXTENDED RELEASE ORAL at 12:39

## 2024-05-24 RX ADMIN — BUPRENORPHINE HYDROCHLORIDE AND NALOXONE HYDROCHLORIDE DIHYDRATE 1 TABLET: 8; 2 TABLET SUBLINGUAL at 00:07

## 2024-05-24 RX ADMIN — IPRATROPIUM BROMIDE AND ALBUTEROL SULFATE 1 DOSE: 2.5; .5 SOLUTION RESPIRATORY (INHALATION) at 11:59

## 2024-05-24 RX ADMIN — LEVOFLOXACIN 500 MG: 5 INJECTION, SOLUTION INTRAVENOUS at 00:11

## 2024-05-24 RX ADMIN — METHYLPREDNISOLONE SODIUM SUCCINATE 40 MG: 40 INJECTION INTRAMUSCULAR; INTRAVENOUS at 00:07

## 2024-05-24 RX ADMIN — LEVOFLOXACIN 500 MG: 5 INJECTION, SOLUTION INTRAVENOUS at 21:06

## 2024-05-24 RX ADMIN — METHYLPREDNISOLONE SODIUM SUCCINATE 40 MG: 40 INJECTION INTRAMUSCULAR; INTRAVENOUS at 13:58

## 2024-05-24 RX ADMIN — GABAPENTIN 600 MG: 300 CAPSULE ORAL at 00:06

## 2024-05-24 NOTE — ED NOTES
Pt agreed to one bag of potassium at this time of his stay and is open to considering more but he states he had a traumatic experience with potassium. Dr. Beauchamp made aware. First bag just completed, will notify receiving floor r/t pt's hx of and current non-compliance. Pt remains off bipap on HF NC satting above 94%. No other complaints at this time.

## 2024-05-24 NOTE — PROGRESS NOTES
Spoke with Dr. Yu asking for clarification on order for regular insulin - okay to give.  Received order for 40meq of po potassium chloride every 3 hours for 3 doses.

## 2024-05-24 NOTE — ED NOTES
ED to Inpatient Handoff Report    Notified floor that electronic handoff available and patient ready for transport to room 416.    Safety Risks: None identified    Patient in Restraints: no    Constant Observer or Patient : no    Telemetry Monitoring Ordered :Yes           Order to transfer to unit without monitor:NO    Last MEWS: 1 Time completed: 2157    Deterioration Index Score:   Predictive Model Details          32 (Caution)  Factor Value    Calculated 5/23/2024 21:56 35% Supplemental oxygen Nasal cannula    Deterioration Index Model 28% Age 53 years old     12% Respiratory rate 19     11% WBC count abnormal (19.7 k/uL)     5% Potassium abnormal (2.4 mmol/L)     3% Systolic 134     3% Sodium 134 mmol/L     2% Blood pH 7.434     0% Pulse 80     0% Pulse oximetry 96 %     0% Temperature 98.6 °F (37 °C)     0% Hematocrit 43.5 %        Vitals:    05/23/24 1906 05/23/24 1947 05/23/24 2136 05/23/24 2151   BP: 131/77  134/77 134/77   Pulse: 77 82 80 80   Resp: 22 22 19 19   Temp:    98.6 °F (37 °C)   TempSrc:    Oral   SpO2: 98%   96%   Weight:       Height:             Opportunity for questions and clarification was provided.

## 2024-05-24 NOTE — ED NOTES
RT in room prior to CT scan transport; states pt would be okay on HF NC, pt on 6L currently satting at 95%, denies SOB and no labored or distressed breathing is noted.

## 2024-05-24 NOTE — PROGRESS NOTES
[facility] Hospitalist Progress Note    Admitting Date and Time: 5/23/2024  1:57 PM  Admit Dx: Hypokalemia [E87.6]  Rhinovirus [B34.8]  COPD exacerbation (HCC) [J44.1]  Acute on chronic respiratory failure with hypoxia and hypercapnia (HCC) [J96.21, J96.22]    Subjective:  Patient is being followed for Hypokalemia [E87.6]  Rhinovirus [B34.8]  COPD exacerbation (HCC) [J44.1]  Acute on chronic respiratory failure with hypoxia and hypercapnia (HCC) [J96.21, J96.22]   Pt was seen and examined.     ROS: denies fever, chills, cp, sob, n/v, HA unless stated above.      magnesium oxide  400 mg Oral Daily    insulin lispro  0-4 Units SubCUTAneous Nightly    insulin lispro  0-4 Units SubCUTAneous TID WC    bumetanide  2 mg Oral BID    buprenorphine-naloxone  1 tablet SubLINGual BID    nicotine  1 patch TransDERmal Daily    methylPREDNISolone  40 mg IntraVENous BID    ipratropium 0.5 mg-albuterol 2.5 mg  1 Dose Inhalation Q4H WA RT    levofloxacin  500 mg IntraVENous Q24H    alogliptin  12.5 mg Oral Daily     gabapentin, 600 mg, Q8H PRN         Objective:    /78   Pulse 85   Temp 97.6 °F (36.4 °C) (Oral)   Resp 18   Ht 1.803 m (5' 11\")   Wt 117.9 kg (260 lb)   SpO2 90%   BMI 36.26 kg/m²     General Appearance: alert and oriented to person, place and time and in no acute distress  Skin: warm and dry  Head: normocephalic and atraumatic  Eyes: pupils equal, round, and reactive to light, extraocular eye movements intact, conjunctivae normal  Neck: neck supple and non tender without mass   Pulmonary/Chest: wheezing   Cardiovascular: normal rate, normal S1 and S2 and no carotid bruits  Abdomen: soft, non-tender, non-distended, normal bowel sounds, no masses or organomegaly  Extremities: no cyanosis, no clubbing and no edema  Neurologic: no cranial nerve deficit and speech normal        Recent Labs     05/23/24  1724 05/24/24  0439 05/24/24  1120    134  --    K 2.4* 2.9* 3.0*   CL 85* 89*  --    CO2 39*

## 2024-05-24 NOTE — H&P
use  ?home o2 - hasn't used in past    Seen dr Alvarado past  -- last admission 2 months - at time was diuresing  Per his note:  \"SpO2 89%. Ambulatory oxygen testing determines the need for 4 liters when ambulating. The bumex infusion has been discontinued and he is on oral Urine output yesterday was 1.9 L. \"    If bipap ordered in past:  \"NIV in the form of BiPap 12/6, 60% at HS and PRN \"    Echo recently - EF 60%L vent dilated  ...Tricuspid Valve: The tricuspid valve is structurally normal with mild tricuspid regurgitation.  A right ventricular systolic pressure of 46 mmHg is calculated suggesting mild pulmonary hypertension.       Also low K+ -- he takes diuretics bumex for LE edema- but doesn't take K+ pills/spironlactone etc..  - will replete K+ will need either above on DC      +infiltrates noted 2 montns ago- cleared up  Noted imaging now:  \" Interval resolution of previously seen mild patchy right greater than left basilar infiltrates. \"    Copd exac but noted inc crp, cover for CAP  Will workup pneumonia- procal, start levaquin  Very hypoxic/hypercapnic  In ER- initially placed on bipap  He initially wore this  But removed when getting CT- he refused to place bipap back on  In ER +rhino virus  I explained viral syndrome +inc WBC- coverage for CAP-abx    Bronchospasm - steroids +dounebs  - in past Rx-- Budesonide/brovana BID   But not taking at home      No hx of DM2  Inc BS in -check aic  Likely undiagnosed DM2  Consider metformin on DC later  For now insulin  Inc bmi - other options GLP1RA or SGLTRA if insurance [place]    Polysubstance and pain issues  Suboxone review  COWS protocol- withdrawal concerns  OARS- couldn't pull up  Gabapentin  Laney Yu MD   Night Officer, overnight admitting doctor at [facility]--please call day time doctor   for questions after 7:30am    Covering for [facility] Hospitalist Service  If Qs please call [phone]  Electronically signed by

## 2024-05-24 NOTE — CARE COORDINATION
Social work:    Scott inquired about obtaining a new PCP. Social service made appt.  with Dr. Duron for June 14th at 2:30 p.m.     Electronically signed by MORELIA Banda on 5/24/2024 at 2:55 PM

## 2024-05-24 NOTE — CARE COORDINATION
Social Work    Chart reviewed. Scott was admitted due to shortness of breath. He has a history of COPD & a past history of addiction. Social service met with Scott, advised him about social service role and discussed discharge planning. Scott is a patient of Dr. Marquez. He resides independently with his girlfriend in a 2-story home. Scott is currently on 5 liters of 02.  He has 02 from [employer] at home but was only using it PRN as Scott advises that he works at [employer] and cannot use the 02 there.  Social work advised Scott about taking a leave until he is able to be without 02. We also discussed [facility] for nursing to keep an eye on his oxygen levels,etc. Scott is in agreement with [facility] and has no agency preference. He is also in need of a pulmonologist to follow O.P.  Social work called Kade[facility] HOLLEY at [phone] fax [phone] and they advised that Scott has a 5 liter concentrator and was ordered 4 liter NC & should have portables at home. If 02 needs change, [facility] will need an updated order. Social work called a referral to [facility] for home nursing and provided Scott with resources should he need to take time off work. Social Service also provided information to help Scott quit smoking.     Electronically signed by MORELIA Banda on 5/24/2024 at 2:38 PM

## 2024-05-24 NOTE — PROGRESS NOTES
05/24/24 0006   NIV Type   $NIV $Daily Charge   NIV Started/Stopped (S)  Off  (pt refused. states he is feeling better. bipap on stby in room.)

## 2024-05-25 LAB
ALBUMIN SERPL-MCNC: 3.6 G/DL (ref 3.5–5.2)
ALP SERPL-CCNC: 130 U/L (ref 40–129)
ALT SERPL-CCNC: 13 U/L (ref 0–40)
ANION GAP SERPL CALCULATED.3IONS-SCNC: 9 MMOL/L (ref 7–16)
AST SERPL-CCNC: 13 U/L (ref 0–39)
BASOPHILS # BLD: 0.03 K/UL (ref 0–0.2)
BASOPHILS NFR BLD: 0 % (ref 0–2)
BILIRUB SERPL-MCNC: 0.3 MG/DL (ref 0–1.2)
BUN SERPL-MCNC: 14 MG/DL (ref 6–20)
CALCIUM SERPL-MCNC: 9.3 MG/DL (ref 8.6–10.2)
CHLORIDE SERPL-SCNC: 94 MMOL/L (ref 98–107)
CO2 SERPL-SCNC: 38 MMOL/L (ref 22–29)
CREAT SERPL-MCNC: 0.6 MG/DL (ref 0.7–1.2)
EOSINOPHIL # BLD: 0 K/UL (ref 0.05–0.5)
EOSINOPHILS RELATIVE PERCENT: 0 % (ref 0–6)
ERYTHROCYTE [DISTWIDTH] IN BLOOD BY AUTOMATED COUNT: 15.2 % (ref 11.5–15)
GFR, ESTIMATED: >90 ML/MIN/1.73M2
GLUCOSE BLD-MCNC: 202 MG/DL (ref 74–99)
GLUCOSE BLD-MCNC: 262 MG/DL (ref 74–99)
GLUCOSE BLD-MCNC: 320 MG/DL (ref 74–99)
GLUCOSE BLD-MCNC: 399 MG/DL (ref 74–99)
GLUCOSE BLD-MCNC: 449 MG/DL (ref 74–99)
GLUCOSE SERPL-MCNC: 193 MG/DL (ref 74–99)
HCT VFR BLD AUTO: 41.9 % (ref 37–54)
HGB BLD-MCNC: 13.2 G/DL (ref 12.5–16.5)
IMM GRANULOCYTES # BLD AUTO: 0.24 K/UL (ref 0–0.58)
IMM GRANULOCYTES NFR BLD: 1 % (ref 0–5)
LYMPHOCYTES NFR BLD: 1.3 K/UL (ref 1.5–4)
LYMPHOCYTES RELATIVE PERCENT: 7 % (ref 20–42)
MAGNESIUM SERPL-MCNC: 2.5 MG/DL (ref 1.6–2.6)
MCH RBC QN AUTO: 30.6 PG (ref 26–35)
MCHC RBC AUTO-ENTMCNC: 31.5 G/DL (ref 32–34.5)
MCV RBC AUTO: 97 FL (ref 80–99.9)
MICROORGANISM SPEC CULT: NO GROWTH
MONOCYTES NFR BLD: 0.54 K/UL (ref 0.1–0.95)
MONOCYTES NFR BLD: 3 % (ref 2–12)
NEUTROPHILS NFR BLD: 90 % (ref 43–80)
NEUTS SEG NFR BLD: 18.03 K/UL (ref 1.8–7.3)
PLATELET # BLD AUTO: 279 K/UL (ref 130–450)
PMV BLD AUTO: 10.5 FL (ref 7–12)
POTASSIUM SERPL-SCNC: 3.3 MMOL/L (ref 3.5–5)
PROT SERPL-MCNC: 7.3 G/DL (ref 6.4–8.3)
RBC # BLD AUTO: 4.32 M/UL (ref 3.8–5.8)
SODIUM SERPL-SCNC: 141 MMOL/L (ref 132–146)
SPECIMEN DESCRIPTION: NORMAL
WBC OTHER # BLD: 20.1 K/UL (ref 4.5–11.5)

## 2024-05-25 PROCEDURE — 6370000000 HC RX 637 (ALT 250 FOR IP): Performed by: INTERNAL MEDICINE

## 2024-05-25 PROCEDURE — 94640 AIRWAY INHALATION TREATMENT: CPT

## 2024-05-25 PROCEDURE — 80053 COMPREHEN METABOLIC PANEL: CPT

## 2024-05-25 PROCEDURE — 6360000002 HC RX W HCPCS: Performed by: STUDENT IN AN ORGANIZED HEALTH CARE EDUCATION/TRAINING PROGRAM

## 2024-05-25 PROCEDURE — 6370000000 HC RX 637 (ALT 250 FOR IP): Performed by: STUDENT IN AN ORGANIZED HEALTH CARE EDUCATION/TRAINING PROGRAM

## 2024-05-25 PROCEDURE — 99233 SBSQ HOSP IP/OBS HIGH 50: CPT | Performed by: STUDENT IN AN ORGANIZED HEALTH CARE EDUCATION/TRAINING PROGRAM

## 2024-05-25 PROCEDURE — 36415 COLL VENOUS BLD VENIPUNCTURE: CPT

## 2024-05-25 PROCEDURE — 6370000000 HC RX 637 (ALT 250 FOR IP): Performed by: NURSE PRACTITIONER

## 2024-05-25 PROCEDURE — 94660 CPAP INITIATION&MGMT: CPT

## 2024-05-25 PROCEDURE — 6360000002 HC RX W HCPCS: Performed by: INTERNAL MEDICINE

## 2024-05-25 PROCEDURE — 2060000000 HC ICU INTERMEDIATE R&B

## 2024-05-25 PROCEDURE — 2700000000 HC OXYGEN THERAPY PER DAY

## 2024-05-25 PROCEDURE — 82962 GLUCOSE BLOOD TEST: CPT

## 2024-05-25 PROCEDURE — 85025 COMPLETE CBC W/AUTO DIFF WBC: CPT

## 2024-05-25 PROCEDURE — 83735 ASSAY OF MAGNESIUM: CPT

## 2024-05-25 RX ORDER — INSULIN GLARGINE 100 [IU]/ML
10 INJECTION, SOLUTION SUBCUTANEOUS ONCE
Status: COMPLETED | OUTPATIENT
Start: 2024-05-25 | End: 2024-05-25

## 2024-05-25 RX ORDER — INSULIN LISPRO 100 [IU]/ML
6 INJECTION, SOLUTION INTRAVENOUS; SUBCUTANEOUS ONCE
Status: COMPLETED | OUTPATIENT
Start: 2024-05-25 | End: 2024-05-25

## 2024-05-25 RX ORDER — INSULIN LISPRO 100 [IU]/ML
0-8 INJECTION, SOLUTION INTRAVENOUS; SUBCUTANEOUS
Status: DISCONTINUED | OUTPATIENT
Start: 2024-05-25 | End: 2024-05-26 | Stop reason: HOSPADM

## 2024-05-25 RX ORDER — METHYLPREDNISOLONE SODIUM SUCCINATE 40 MG/ML
40 INJECTION, POWDER, LYOPHILIZED, FOR SOLUTION INTRAMUSCULAR; INTRAVENOUS EVERY 12 HOURS
Status: DISCONTINUED | OUTPATIENT
Start: 2024-05-25 | End: 2024-05-26 | Stop reason: HOSPADM

## 2024-05-25 RX ORDER — DEXTROSE MONOHYDRATE 100 MG/ML
INJECTION, SOLUTION INTRAVENOUS CONTINUOUS PRN
Status: DISCONTINUED | OUTPATIENT
Start: 2024-05-25 | End: 2024-05-26 | Stop reason: HOSPADM

## 2024-05-25 RX ORDER — INSULIN GLARGINE 100 [IU]/ML
10 INJECTION, SOLUTION SUBCUTANEOUS NIGHTLY
Status: DISCONTINUED | OUTPATIENT
Start: 2024-05-25 | End: 2024-05-26 | Stop reason: HOSPADM

## 2024-05-25 RX ORDER — INSULIN LISPRO 100 [IU]/ML
0-4 INJECTION, SOLUTION INTRAVENOUS; SUBCUTANEOUS NIGHTLY
Status: DISCONTINUED | OUTPATIENT
Start: 2024-05-25 | End: 2024-05-26 | Stop reason: HOSPADM

## 2024-05-25 RX ORDER — POTASSIUM CHLORIDE 20 MEQ/1
40 TABLET, EXTENDED RELEASE ORAL ONCE
Status: COMPLETED | OUTPATIENT
Start: 2024-05-25 | End: 2024-05-25

## 2024-05-25 RX ORDER — GLUCAGON 1 MG/ML
1 KIT INJECTION PRN
Status: DISCONTINUED | OUTPATIENT
Start: 2024-05-25 | End: 2024-05-26 | Stop reason: HOSPADM

## 2024-05-25 RX ADMIN — METHYLPREDNISOLONE SODIUM SUCCINATE 40 MG: 40 INJECTION INTRAMUSCULAR; INTRAVENOUS at 01:00

## 2024-05-25 RX ADMIN — IPRATROPIUM BROMIDE AND ALBUTEROL SULFATE 1 DOSE: 2.5; .5 SOLUTION RESPIRATORY (INHALATION) at 20:51

## 2024-05-25 RX ADMIN — BUPRENORPHINE HYDROCHLORIDE AND NALOXONE HYDROCHLORIDE DIHYDRATE 1 TABLET: 8; 2 TABLET SUBLINGUAL at 07:56

## 2024-05-25 RX ADMIN — POTASSIUM CHLORIDE 40 MEQ: 1500 TABLET, EXTENDED RELEASE ORAL at 09:01

## 2024-05-25 RX ADMIN — METFORMIN HYDROCHLORIDE 1000 MG: 1000 TABLET ORAL at 16:50

## 2024-05-25 RX ADMIN — BUMETANIDE 2 MG: 1 TABLET ORAL at 16:50

## 2024-05-25 RX ADMIN — INSULIN GLARGINE 10 UNITS: 100 INJECTION, SOLUTION SUBCUTANEOUS at 01:00

## 2024-05-25 RX ADMIN — BUMETANIDE 2 MG: 1 TABLET ORAL at 07:56

## 2024-05-25 RX ADMIN — METFORMIN HYDROCHLORIDE 1000 MG: 1000 TABLET ORAL at 11:44

## 2024-05-25 RX ADMIN — LEVOFLOXACIN 500 MG: 5 INJECTION, SOLUTION INTRAVENOUS at 20:10

## 2024-05-25 RX ADMIN — INSULIN LISPRO 6 UNITS: 100 INJECTION, SOLUTION INTRAVENOUS; SUBCUTANEOUS at 01:00

## 2024-05-25 RX ADMIN — METHYLPREDNISOLONE SODIUM SUCCINATE 40 MG: 40 INJECTION INTRAMUSCULAR; INTRAVENOUS at 07:56

## 2024-05-25 RX ADMIN — INSULIN LISPRO 1 UNITS: 100 INJECTION, SOLUTION INTRAVENOUS; SUBCUTANEOUS at 06:10

## 2024-05-25 RX ADMIN — IPRATROPIUM BROMIDE AND ALBUTEROL SULFATE 1 DOSE: 2.5; .5 SOLUTION RESPIRATORY (INHALATION) at 08:39

## 2024-05-25 RX ADMIN — METHYLPREDNISOLONE SODIUM SUCCINATE 40 MG: 40 INJECTION INTRAMUSCULAR; INTRAVENOUS at 20:08

## 2024-05-25 RX ADMIN — IPRATROPIUM BROMIDE AND ALBUTEROL SULFATE 1 DOSE: 2.5; .5 SOLUTION RESPIRATORY (INHALATION) at 13:19

## 2024-05-25 RX ADMIN — INSULIN LISPRO 3 UNITS: 100 INJECTION, SOLUTION INTRAVENOUS; SUBCUTANEOUS at 11:44

## 2024-05-25 RX ADMIN — INSULIN LISPRO 8 UNITS: 100 INJECTION, SOLUTION INTRAVENOUS; SUBCUTANEOUS at 15:48

## 2024-05-25 RX ADMIN — IPRATROPIUM BROMIDE AND ALBUTEROL SULFATE 1 DOSE: 2.5; .5 SOLUTION RESPIRATORY (INHALATION) at 17:07

## 2024-05-25 RX ADMIN — INSULIN GLARGINE 10 UNITS: 100 INJECTION, SOLUTION SUBCUTANEOUS at 20:07

## 2024-05-25 RX ADMIN — BUPRENORPHINE HYDROCHLORIDE AND NALOXONE HYDROCHLORIDE DIHYDRATE 1 TABLET: 8; 2 TABLET SUBLINGUAL at 20:08

## 2024-05-25 RX ADMIN — MAGNESIUM OXIDE 400 MG (241.3 MG MAGNESIUM) TABLET 400 MG: TABLET at 07:56

## 2024-05-25 RX ADMIN — ALOGLIPTIN 12.5 MG: 12.5 TABLET, FILM COATED ORAL at 07:56

## 2024-05-25 NOTE — PLAN OF CARE
Problem: Discharge Planning  Goal: Discharge to home or other facility with appropriate resources  Outcome: Progressing     Problem: Safety - Adult  Goal: Free from fall injury  5/24/2024 2042 by Pauline Thompson, RN  Outcome: Progressing  5/24/2024 0945 by Madalyn Powers, RN  Outcome: Progressing

## 2024-05-25 NOTE — PLAN OF CARE
Problem: Safety - Adult  Goal: Free from fall injury  5/25/2024 0952 by Madalyn Powers, RN  Outcome: Progressing  5/24/2024 2042 by Pauline Thompson, RN  Outcome: Progressing

## 2024-05-25 NOTE — PROGRESS NOTES
[facility] Hospitalist Progress Note    Admitting Date and Time: 5/23/2024  1:57 PM  Admit Dx: Hypokalemia [E87.6]  Rhinovirus [B34.8]  COPD exacerbation (HCC) [J44.1]  Acute on chronic respiratory failure with hypoxia and hypercapnia (HCC) [J96.21, J96.22]    Subjective:  Patient is being followed for Hypokalemia [E87.6]  Rhinovirus [B34.8]  COPD exacerbation (HCC) [J44.1]  Acute on chronic respiratory failure with hypoxia and hypercapnia (HCC) [J96.21, J96.22]     Patient still has a lot of cough, congestion short of breath.    ROS: denies fever, chills, cp, sob, n/v, HA unless stated above.      methylPREDNISolone  40 mg IntraVENous Q12H    metFORMIN  1,000 mg Oral BID WC    insulin glargine  10 Units SubCUTAneous Nightly    magnesium oxide  400 mg Oral Daily    insulin lispro  0-4 Units SubCUTAneous Nightly    insulin lispro  0-4 Units SubCUTAneous TID WC    bumetanide  2 mg Oral BID    buprenorphine-naloxone  1 tablet SubLINGual BID    nicotine  1 patch TransDERmal Daily    ipratropium 0.5 mg-albuterol 2.5 mg  1 Dose Inhalation Q4H WA RT    levofloxacin  500 mg IntraVENous Q24H    alogliptin  12.5 mg Oral Daily     glucose, 4 tablet, PRN  dextrose bolus, 125 mL, PRN   Or  dextrose bolus, 250 mL, PRN  glucagon (rDNA), 1 mg, PRN  dextrose, , Continuous PRN  sodium chloride, 1 spray, PRN  gabapentin, 600 mg, Q8H PRN         Objective:    /61   Pulse 78   Temp 97.5 °F (36.4 °C) (Temporal)   Resp 16   Ht 1.803 m (5' 11\")   Wt 122 kg (269 lb)   SpO2 90%   BMI 37.52 kg/m²     General Appearance: alert and oriented to person, place and time and in no acute distress  Skin: warm and dry  Head: normocephalic and atraumatic  Eyes: pupils equal, round, and reactive to light, extraocular eye movements intact, conjunctivae normal  Neck: neck supple and non tender without mass   Pulmonary/Chest: wheezing.   Cardiovascular: normal rate, normal S1 and S2 and no carotid bruits  Abdomen: soft, non-tender,

## 2024-05-25 NOTE — PROGRESS NOTES
Notified KARINA Thompson NP, of patient critical blood glucose of 578-order to give 16u humalog now and recheck at midnight rec'd.

## 2024-05-26 VITALS
BODY MASS INDEX: 37.66 KG/M2 | HEART RATE: 81 BPM | RESPIRATION RATE: 16 BRPM | TEMPERATURE: 97.6 F | WEIGHT: 269 LBS | HEIGHT: 71 IN | SYSTOLIC BLOOD PRESSURE: 118 MMHG | OXYGEN SATURATION: 95 % | DIASTOLIC BLOOD PRESSURE: 81 MMHG

## 2024-05-26 LAB
ALBUMIN: 3.6 G/DL (ref 3.5–5.2)
ANION GAP SERPL CALCULATED.3IONS-SCNC: 10 MMOL/L (ref 7–16)
BUN SERPL-MCNC: 18 MG/DL (ref 6–20)
CALCIUM SERPL-MCNC: 9.2 MG/DL (ref 8.6–10.2)
CHLORIDE SERPL-SCNC: 90 MMOL/L (ref 98–107)
CO2 SERPL-SCNC: 37 MMOL/L (ref 22–29)
CREAT SERPL-MCNC: 0.6 MG/DL (ref 0.7–1.2)
GFR, ESTIMATED: >90 ML/MIN/1.73M2
GLUCOSE BLD-MCNC: 194 MG/DL (ref 74–99)
GLUCOSE BLD-MCNC: 228 MG/DL (ref 74–99)
GLUCOSE SERPL-MCNC: 233 MG/DL (ref 74–99)
MAGNESIUM SERPL-MCNC: 2.4 MG/DL (ref 1.6–2.6)
PHOSPHATE SERPL-MCNC: 3.4 MG/DL (ref 2.5–4.5)
POTASSIUM SERPL-SCNC: 3.3 MMOL/L (ref 3.5–5)
SODIUM SERPL-SCNC: 137 MMOL/L (ref 132–146)

## 2024-05-26 PROCEDURE — 94640 AIRWAY INHALATION TREATMENT: CPT

## 2024-05-26 PROCEDURE — 6370000000 HC RX 637 (ALT 250 FOR IP): Performed by: INTERNAL MEDICINE

## 2024-05-26 PROCEDURE — 6370000000 HC RX 637 (ALT 250 FOR IP): Performed by: STUDENT IN AN ORGANIZED HEALTH CARE EDUCATION/TRAINING PROGRAM

## 2024-05-26 PROCEDURE — 82962 GLUCOSE BLOOD TEST: CPT

## 2024-05-26 PROCEDURE — 99239 HOSP IP/OBS DSCHRG MGMT >30: CPT | Performed by: STUDENT IN AN ORGANIZED HEALTH CARE EDUCATION/TRAINING PROGRAM

## 2024-05-26 PROCEDURE — 80069 RENAL FUNCTION PANEL: CPT

## 2024-05-26 PROCEDURE — 2700000000 HC OXYGEN THERAPY PER DAY

## 2024-05-26 PROCEDURE — 83735 ASSAY OF MAGNESIUM: CPT

## 2024-05-26 PROCEDURE — 6360000002 HC RX W HCPCS: Performed by: INTERNAL MEDICINE

## 2024-05-26 PROCEDURE — 6360000002 HC RX W HCPCS: Performed by: STUDENT IN AN ORGANIZED HEALTH CARE EDUCATION/TRAINING PROGRAM

## 2024-05-26 PROCEDURE — 36415 COLL VENOUS BLD VENIPUNCTURE: CPT

## 2024-05-26 RX ORDER — METFORMIN HYDROCHLORIDE 500 MG/1
1000 TABLET, EXTENDED RELEASE ORAL 2 TIMES DAILY
Qty: 120 TABLET | Refills: 0 | Status: SHIPPED | OUTPATIENT
Start: 2024-05-26 | End: 2024-06-25

## 2024-05-26 RX ORDER — LEVOFLOXACIN 750 MG/1
750 TABLET, FILM COATED ORAL DAILY
Qty: 5 TABLET | Refills: 0 | Status: SHIPPED | OUTPATIENT
Start: 2024-05-26 | End: 2024-05-31

## 2024-05-26 RX ORDER — POTASSIUM CHLORIDE 20 MEQ/1
40 TABLET, EXTENDED RELEASE ORAL ONCE
Status: COMPLETED | OUTPATIENT
Start: 2024-05-26 | End: 2024-05-26

## 2024-05-26 RX ORDER — PREDNISONE 20 MG/1
40 TABLET ORAL DAILY
Qty: 10 TABLET | Refills: 0 | Status: SHIPPED | OUTPATIENT
Start: 2024-05-26 | End: 2024-05-31

## 2024-05-26 RX ADMIN — MAGNESIUM OXIDE 400 MG (241.3 MG MAGNESIUM) TABLET 400 MG: TABLET at 08:27

## 2024-05-26 RX ADMIN — METHYLPREDNISOLONE SODIUM SUCCINATE 40 MG: 40 INJECTION INTRAMUSCULAR; INTRAVENOUS at 08:28

## 2024-05-26 RX ADMIN — IPRATROPIUM BROMIDE AND ALBUTEROL SULFATE 1 DOSE: 2.5; .5 SOLUTION RESPIRATORY (INHALATION) at 09:45

## 2024-05-26 RX ADMIN — BUMETANIDE 2 MG: 1 TABLET ORAL at 08:27

## 2024-05-26 RX ADMIN — POTASSIUM CHLORIDE 40 MEQ: 1500 TABLET, EXTENDED RELEASE ORAL at 08:26

## 2024-05-26 RX ADMIN — ALOGLIPTIN 12.5 MG: 12.5 TABLET, FILM COATED ORAL at 08:37

## 2024-05-26 RX ADMIN — BUPRENORPHINE HYDROCHLORIDE AND NALOXONE HYDROCHLORIDE DIHYDRATE 1 TABLET: 8; 2 TABLET SUBLINGUAL at 08:26

## 2024-05-26 RX ADMIN — INSULIN LISPRO 2 UNITS: 100 INJECTION, SOLUTION INTRAVENOUS; SUBCUTANEOUS at 06:32

## 2024-05-26 RX ADMIN — METFORMIN HYDROCHLORIDE 1000 MG: 1000 TABLET ORAL at 08:27

## 2024-05-26 NOTE — PROGRESS NOTES
Patient had home O2 concentrator in room. When he exited room to leave he was not wearing O2. Education ineffective.

## 2024-05-26 NOTE — DISCHARGE SUMMARY
[facility] Hospitalist Physician Discharge Summary       [facility] [employer]  [address] [address]  [phone]        Nic Patel,   [address]  [place] [address]  [phone]    Follow up  New family physician appt is for June 14th at 2:30 p.m.-- bring photo ID- medications.  If you need to cancel call 24 hrs in advance. Go 15 minutes early on 1st appt to complete paperwork.      Activity level: As tolerated     Dispo: home       Condition on discharge: Stable     Patient ID:  Scott De Luna  [id number]  53 y.o.  1970    Admit date: 5/23/2024    Discharge date and time:  5/26/2024  11:15 AM    Admission Diagnoses: Principal Problem:    Rhinovirus  Active Problems:    Tobacco abuse    CAP (community acquired pneumonia)    Acute on chronic respiratory failure with hypoxia and hypercapnia (HCC)    BMI 36.0-36.9,adult    Acute bronchospasm    Hypokalemia    Increased white blood cell count  Resolved Problems:    * No resolved hospital problems. *      Discharge Diagnoses: Principal Problem:    Rhinovirus  Active Problems:    Tobacco abuse    CAP (community acquired pneumonia)    Acute on chronic respiratory failure with hypoxia and hypercapnia (HCC)    BMI 36.0-36.9,adult    Acute bronchospasm    Hypokalemia    Increased white blood cell count  Resolved Problems:    * No resolved hospital problems. *      Consults:  IP CONSULT TO SOCIAL WORK  IP CONSULT TO IV TEAM  IP CONSULT TO HOME CARE NEEDS    Procedures:     Hospital Course:     53-year-old male admitted for COPD exacerbation secondary to viral infection  Patient received COPD treatment including nebulizer treatment systemic steroid, A1c 7.2 and is diagnosed with a new type 2 diabetes will use insulin therapy while he is inpatient start metformin.  Discussed the plan with the patient to discharge with metformin follow-up with his PCP in 1 week for close f/u.   Patient continued to improve

## 2024-05-26 NOTE — PLAN OF CARE
Problem: Discharge Planning  Goal: Discharge to home or other facility with appropriate resources  Outcome: Progressing     Problem: Safety - Adult  Goal: Free from fall injury  5/25/2024 2236 by Susan Whitehead RN  Outcome: Progressing

## 2024-05-28 LAB
MICROORGANISM SPEC CULT: NORMAL
MICROORGANISM SPEC CULT: NORMAL
SERVICE CMNT-IMP: NORMAL
SERVICE CMNT-IMP: NORMAL
SPECIMEN DESCRIPTION: NORMAL
SPECIMEN DESCRIPTION: NORMAL

## 2024-05-28 NOTE — ADT AUTH CERT
Progress Notes by Mookie Garrison DO at 5/25/2024 11:33 AM    Author: Mookie Garrison DO Service: Internal Medicine Author Type: Physician   Filed: 5/25/2024 11:34 AM Date of Service: 5/25/2024 11:33 AM Status: Signed   : Mookie Garrison DO (Physician)   Expand All Collapse All          [facility] Hospitalist Progress Note     Admitting Date and Time: 5/23/2024  1:57 PM  Admit Dx: Hypokalemia [E87.6]  Rhinovirus [B34.8]  COPD exacerbation (HCC) [J44.1]  Acute on chronic respiratory failure with hypoxia and hypercapnia (HCC) [J96.21, J96.22]     Subjective:  Patient is being followed for Hypokalemia [E87.6]  Rhinovirus [B34.8]  COPD exacerbation (HCC) [J44.1]  Acute on chronic respiratory failure with hypoxia and hypercapnia (HCC) [J96.21, J96.22]      Patient still has a lot of cough, congestion short of breath.     ROS: denies fever, chills, cp, sob, n/v, HA unless stated above.      Scheduled Medications    methylPREDNISolone  40 mg IntraVENous Q12H    metFORMIN  1,000 mg Oral BID WC    insulin glargine  10 Units SubCUTAneous Nightly    magnesium oxide  400 mg Oral Daily    insulin lispro  0-4 Units SubCUTAneous Nightly    insulin lispro  0-4 Units SubCUTAneous TID WC    bumetanide  2 mg Oral BID    buprenorphine-naloxone  1 tablet SubLINGual BID    nicotine  1 patch TransDERmal Daily    ipratropium 0.5 mg-albuterol 2.5 mg  1 Dose Inhalation Q4H WA RT    levofloxacin  500 mg IntraVENous Q24H    alogliptin  12.5 mg Oral Daily           PRN Medications   glucose, 4 tablet, PRN  dextrose bolus, 125 mL, PRN   Or  dextrose bolus, 250 mL, PRN  glucagon (rDNA), 1 mg, PRN  dextrose, , Continuous PRN  sodium chloride, 1 spray, PRN  gabapentin, 600 mg, Q8H PRN            Objective:     /61   Pulse 78   Temp 97.5 °F (36.4 °C) (Temporal)   Resp 16   Ht 1.803 m (5' 11\")   Wt 122 kg (269 lb)   SpO2 90%   BMI 37.52 kg/m²      General Appearance: alert and oriented to person, place and time and in no acute

## 2024-06-14 ENCOUNTER — OFFICE VISIT (OUTPATIENT)
Dept: FAMILY MEDICINE CLINIC | Age: 54
End: 2024-06-14
Payer: COMMERCIAL

## 2024-06-14 ENCOUNTER — HOSPITAL ENCOUNTER (OUTPATIENT)
Age: 54
Discharge: HOME | End: 2024-06-14
Payer: COMMERCIAL

## 2024-06-14 VITALS
HEIGHT: 71 IN | OXYGEN SATURATION: 96 % | SYSTOLIC BLOOD PRESSURE: 131 MMHG | WEIGHT: 265.2 LBS | HEART RATE: 92 BPM | TEMPERATURE: 97 F | RESPIRATION RATE: 20 BRPM | BODY MASS INDEX: 37.13 KG/M2 | DIASTOLIC BLOOD PRESSURE: 82 MMHG

## 2024-06-14 DIAGNOSIS — J44.9 CHRONIC OBSTRUCTIVE PULMONARY DISEASE, UNSPECIFIED COPD TYPE (HCC): ICD-10-CM

## 2024-06-14 DIAGNOSIS — J96.22 ACUTE ON CHRONIC RESPIRATORY FAILURE WITH HYPOXIA AND HYPERCAPNIA (HCC): ICD-10-CM

## 2024-06-14 DIAGNOSIS — J18.9 COMMUNITY ACQUIRED PNEUMONIA OF LEFT LOWER LOBE OF LUNG: ICD-10-CM

## 2024-06-14 DIAGNOSIS — E87.6 HYPOKALEMIA: ICD-10-CM

## 2024-06-14 DIAGNOSIS — B18.2 CHRONIC HEPATITIS C WITHOUT HEPATIC COMA (HCC): ICD-10-CM

## 2024-06-14 DIAGNOSIS — J96.21 ACUTE ON CHRONIC RESPIRATORY FAILURE WITH HYPOXIA AND HYPERCAPNIA (HCC): ICD-10-CM

## 2024-06-14 DIAGNOSIS — Z76.89 ESTABLISHING CARE WITH NEW DOCTOR, ENCOUNTER FOR: Primary | ICD-10-CM

## 2024-06-14 DIAGNOSIS — R59.0 MEDIASTINAL ADENOPATHY: ICD-10-CM

## 2024-06-14 DIAGNOSIS — E66.09 CLASS 2 OBESITY DUE TO EXCESS CALORIES WITHOUT SERIOUS COMORBIDITY WITH BODY MASS INDEX (BMI) OF 37.0 TO 37.9 IN ADULT: ICD-10-CM

## 2024-06-14 DIAGNOSIS — E11.9 TYPE 2 DIABETES MELLITUS WITHOUT COMPLICATION, WITHOUT LONG-TERM CURRENT USE OF INSULIN (HCC): ICD-10-CM

## 2024-06-14 DIAGNOSIS — Z87.891 FORMER SMOKER: ICD-10-CM

## 2024-06-14 DIAGNOSIS — F11.10 HEROIN ABUSE (HCC): Chronic | ICD-10-CM

## 2024-06-14 LAB
ALBUMIN SERPL-MCNC: 4.1 G/DL (ref 3.5–5.2)
ALP SERPL-CCNC: 95 U/L (ref 40–129)
ALT SERPL-CCNC: 24 U/L (ref 0–40)
ANION GAP SERPL CALCULATED.3IONS-SCNC: 9 MMOL/L (ref 7–16)
AST SERPL-CCNC: 21 U/L (ref 0–39)
BASOPHILS # BLD: 0.04 K/UL (ref 0–0.2)
BASOPHILS NFR BLD: 1 % (ref 0–2)
BILIRUB SERPL-MCNC: 0.4 MG/DL (ref 0–1.2)
BNP SERPL-MCNC: 148 PG/ML (ref 0–125)
BUN SERPL-MCNC: 5 MG/DL (ref 6–20)
CALCIUM SERPL-MCNC: 9.7 MG/DL (ref 8.6–10.2)
CHLORIDE SERPL-SCNC: 97 MMOL/L (ref 98–107)
CO2 SERPL-SCNC: 35 MMOL/L (ref 22–29)
CREAT SERPL-MCNC: 0.6 MG/DL (ref 0.7–1.2)
EOSINOPHIL # BLD: 0.21 K/UL (ref 0.05–0.5)
EOSINOPHILS RELATIVE PERCENT: 3 % (ref 0–6)
ERYTHROCYTE [DISTWIDTH] IN BLOOD BY AUTOMATED COUNT: 14.6 % (ref 11.5–15)
GFR, ESTIMATED: >90 ML/MIN/1.73M2
GLUCOSE SERPL-MCNC: 124 MG/DL (ref 74–99)
HCT VFR BLD AUTO: 43.1 % (ref 37–54)
HGB BLD-MCNC: 14.1 G/DL (ref 12.5–16.5)
IMM GRANULOCYTES # BLD AUTO: 0.03 K/UL (ref 0–0.58)
IMM GRANULOCYTES NFR BLD: 0 % (ref 0–5)
LYMPHOCYTES NFR BLD: 2.66 K/UL (ref 1.5–4)
LYMPHOCYTES RELATIVE PERCENT: 33 % (ref 20–42)
MCH RBC QN AUTO: 31 PG (ref 26–35)
MCHC RBC AUTO-ENTMCNC: 32.7 G/DL (ref 32–34.5)
MCV RBC AUTO: 94.7 FL (ref 80–99.9)
MONOCYTES NFR BLD: 0.48 K/UL (ref 0.1–0.95)
MONOCYTES NFR BLD: 6 % (ref 2–12)
NEUTROPHILS NFR BLD: 57 % (ref 43–80)
NEUTS SEG NFR BLD: 4.54 K/UL (ref 1.8–7.3)
PLATELET # BLD AUTO: 172 K/UL (ref 130–450)
PMV BLD AUTO: 10.1 FL (ref 7–12)
POTASSIUM SERPL-SCNC: 2.9 MMOL/L (ref 3.5–5)
PROT SERPL-MCNC: 7.5 G/DL (ref 6.4–8.3)
RBC # BLD AUTO: 4.55 M/UL (ref 3.8–5.8)
SODIUM SERPL-SCNC: 141 MMOL/L (ref 132–146)
WBC OTHER # BLD: 8 K/UL (ref 4.5–11.5)

## 2024-06-14 PROCEDURE — 85025 COMPLETE CBC W/AUTO DIFF WBC: CPT

## 2024-06-14 PROCEDURE — 80053 COMPREHEN METABOLIC PANEL: CPT

## 2024-06-14 PROCEDURE — 36415 COLL VENOUS BLD VENIPUNCTURE: CPT

## 2024-06-14 PROCEDURE — 83880 ASSAY OF NATRIURETIC PEPTIDE: CPT

## 2024-06-14 PROCEDURE — 3051F HG A1C>EQUAL 7.0%<8.0%: CPT | Performed by: NEUROMUSCULOSKELETAL MEDICINE & OMM

## 2024-06-14 PROCEDURE — 99204 OFFICE O/P NEW MOD 45 MIN: CPT | Performed by: NEUROMUSCULOSKELETAL MEDICINE & OMM

## 2024-06-14 RX ORDER — GUAIFENESIN 600 MG/1
600 TABLET, EXTENDED RELEASE ORAL 2 TIMES DAILY
Qty: 20 TABLET | Refills: 0 | Status: SHIPPED | OUTPATIENT
Start: 2024-06-14 | End: 2024-06-24

## 2024-06-14 SDOH — ECONOMIC STABILITY: HOUSING INSECURITY: IN THE LAST 12 MONTHS, HOW MANY PLACES HAVE YOU LIVED?: 1

## 2024-06-14 SDOH — SOCIAL STABILITY: SOCIAL NETWORK: HOW OFTEN DO YOU ATTENT MEETINGS OF THE CLUB OR ORGANIZATION YOU BELONG TO?: MORE THAN 4 TIMES PER YEAR

## 2024-06-14 SDOH — HEALTH STABILITY: MENTAL HEALTH: HOW MANY STANDARD DRINKS CONTAINING ALCOHOL DO YOU HAVE ON A TYPICAL DAY?: PATIENT DOES NOT DRINK

## 2024-06-14 SDOH — SOCIAL STABILITY: SOCIAL NETWORK: HOW OFTEN DO YOU ATTEND CHURCH OR RELIGIOUS SERVICES?: NEVER

## 2024-06-14 SDOH — HEALTH STABILITY: MENTAL HEALTH: HOW OFTEN DO YOU HAVE A DRINK CONTAINING ALCOHOL?: NEVER

## 2024-06-14 SDOH — SOCIAL STABILITY: SOCIAL INSECURITY: WITHIN THE LAST YEAR, HAVE YOU BEEN AFRAID OF YOUR PARTNER OR EX-PARTNER?: NO

## 2024-06-14 SDOH — SOCIAL STABILITY: SOCIAL INSECURITY: WITHIN THE LAST YEAR, HAVE YOU BEEN HUMILIATED OR EMOTIONALLY ABUSED IN OTHER WAYS BY YOUR PARTNER OR EX-PARTNER?: NO

## 2024-06-14 SDOH — ECONOMIC STABILITY: FOOD INSECURITY: WITHIN THE PAST 12 MONTHS, YOU WORRIED THAT YOUR FOOD WOULD RUN OUT BEFORE YOU GOT MONEY TO BUY MORE.: NEVER TRUE

## 2024-06-14 SDOH — SOCIAL STABILITY: SOCIAL NETWORK: HOW OFTEN DO YOU GET TOGETHER WITH FRIENDS OR RELATIVES?: MORE THAN THREE TIMES A WEEK

## 2024-06-14 SDOH — ECONOMIC STABILITY: TRANSPORTATION INSECURITY
IN THE PAST 12 MONTHS, HAS THE LACK OF TRANSPORTATION KEPT YOU FROM MEDICAL APPOINTMENTS OR FROM GETTING MEDICATIONS?: NO

## 2024-06-14 SDOH — ECONOMIC STABILITY: HOUSING INSECURITY
IN THE LAST 12 MONTHS, WAS THERE A TIME WHEN YOU DID NOT HAVE A STEADY PLACE TO SLEEP OR SLEPT IN A SHELTER (INCLUDING NOW)?: NO

## 2024-06-14 SDOH — HEALTH STABILITY: PHYSICAL HEALTH: ON AVERAGE, HOW MANY MINUTES DO YOU ENGAGE IN EXERCISE AT THIS LEVEL?: 0 MIN

## 2024-06-14 SDOH — ECONOMIC STABILITY: FOOD INSECURITY: WITHIN THE PAST 12 MONTHS, THE FOOD YOU BOUGHT JUST DIDN'T LAST AND YOU DIDN'T HAVE MONEY TO GET MORE.: NEVER TRUE

## 2024-06-14 SDOH — ECONOMIC STABILITY: INCOME INSECURITY: HOW HARD IS IT FOR YOU TO PAY FOR THE VERY BASICS LIKE FOOD, HOUSING, MEDICAL CARE, AND HEATING?: NOT HARD AT ALL

## 2024-06-14 SDOH — SOCIAL STABILITY: SOCIAL NETWORK: ARE YOU MARRIED, WIDOWED, DIVORCED, SEPARATED, NEVER MARRIED, OR LIVING WITH A PARTNER?: LIVING WITH PARTNER

## 2024-06-14 SDOH — SOCIAL STABILITY: SOCIAL INSECURITY
WITHIN THE LAST YEAR, HAVE YOU BEEN KICKED, HIT, SLAPPED, OR OTHERWISE PHYSICALLY HURT BY YOUR PARTNER OR EX-PARTNER?: NO

## 2024-06-14 SDOH — HEALTH STABILITY: MENTAL HEALTH
STRESS IS WHEN SOMEONE FEELS TENSE, NERVOUS, ANXIOUS, OR CAN'T SLEEP AT NIGHT BECAUSE THEIR MIND IS TROUBLED. HOW STRESSED ARE YOU?: NOT AT ALL

## 2024-06-14 SDOH — ECONOMIC STABILITY: INCOME INSECURITY: IN THE LAST 12 MONTHS, WAS THERE A TIME WHEN YOU WERE NOT ABLE TO PAY THE MORTGAGE OR RENT ON TIME?: NO

## 2024-06-14 SDOH — SOCIAL STABILITY: SOCIAL NETWORK
DO YOU BELONG TO ANY CLUBS OR ORGANIZATIONS SUCH AS CHURCH GROUPS UNIONS, FRATERNAL OR ATHLETIC GROUPS, OR SCHOOL GROUPS?: YES

## 2024-06-14 SDOH — SOCIAL STABILITY: SOCIAL NETWORK: IN A TYPICAL WEEK, HOW MANY TIMES DO YOU TALK ON THE PHONE WITH FAMILY, FRIENDS, OR NEIGHBORS?: NEVER

## 2024-06-14 SDOH — SOCIAL STABILITY: SOCIAL INSECURITY
WITHIN THE LAST YEAR, HAVE TO BEEN RAPED OR FORCED TO HAVE ANY KIND OF SEXUAL ACTIVITY BY YOUR PARTNER OR EX-PARTNER?: NO

## 2024-06-14 SDOH — HEALTH STABILITY: PHYSICAL HEALTH: ON AVERAGE, HOW MANY DAYS PER WEEK DO YOU ENGAGE IN MODERATE TO STRENUOUS EXERCISE (LIKE A BRISK WALK)?: 0 DAYS

## 2024-06-14 ASSESSMENT — ENCOUNTER SYMPTOMS
BLOOD IN STOOL: 0
ANAL BLEEDING: 0
ABDOMINAL PAIN: 0
CHEST TIGHTNESS: 0
WHEEZING: 1
CONSTIPATION: 0
ABDOMINAL DISTENTION: 0
NAUSEA: 0
COUGH: 1
VOMITING: 0
BACK PAIN: 0
SHORTNESS OF BREATH: 1
CHOKING: 0
DIARRHEA: 0

## 2024-06-14 ASSESSMENT — PATIENT HEALTH QUESTIONNAIRE - PHQ9
SUM OF ALL RESPONSES TO PHQ QUESTIONS 1-9: 0
SUM OF ALL RESPONSES TO PHQ9 QUESTIONS 1 & 2: 0
2. FEELING DOWN, DEPRESSED OR HOPELESS: NOT AT ALL
SUM OF ALL RESPONSES TO PHQ QUESTIONS 1-9: 0
1. LITTLE INTEREST OR PLEASURE IN DOING THINGS: NOT AT ALL

## 2024-06-14 NOTE — PROGRESS NOTES
After Visit Summary and given to patient at the end of visit.     regarding above diagnosis, including possible risks and complications,  especially if left uncontrolled.     Counseled regarding the possible side effects, risks, benefits and alternatives to treatment; patient and/or guardian verbalizes understanding, agrees, feels comfortable with and wishes to proceed withabove treatment plan.     Advised patient to call with any new medication issues, and read all Rx infofrom pharmacy to assure aware of all possible risks and side effects of medication before taking.   age and gender appropriate health screening exams and vaccinations.  Advised patient regarding importance of keeping up with recommended health maintenance and to schedule as soon as possible if overdue, as this isimportant in assessing for undiagnosed pathology, especially cancer, as well as protecting against potentially harmful/life threatening disease.          Patient and/or guardian verbalizes understanding andagrees with above counseling, assessment and plan.     All questions answered.      An electronic signature was used to authenticate this note.    --Nic Patel, DO

## 2024-06-15 DIAGNOSIS — E87.6 HYPOKALEMIA: Primary | ICD-10-CM

## 2024-06-15 RX ORDER — POTASSIUM CHLORIDE 20 MEQ/1
40 TABLET, EXTENDED RELEASE ORAL 2 TIMES DAILY
Qty: 120 TABLET | Refills: 5 | Status: SHIPPED | OUTPATIENT
Start: 2024-06-15

## 2024-06-17 ENCOUNTER — TELEPHONE (OUTPATIENT)
Dept: FAMILY MEDICINE CLINIC | Age: 54
End: 2024-06-17

## 2024-06-17 DIAGNOSIS — E87.6 HYPOKALEMIA: ICD-10-CM

## 2024-06-17 RX ORDER — POTASSIUM CHLORIDE 20 MEQ/1
40 TABLET, EXTENDED RELEASE ORAL 2 TIMES DAILY
Qty: 120 TABLET | Refills: 5 | Status: SHIPPED | OUTPATIENT
Start: 2024-06-17

## 2024-06-17 NOTE — TELEPHONE ENCOUNTER
potassium chloride (KLOR-CON M) 20 MEQ extended release tablet     Pt needs this prescription sent to [facility] not [facility]

## 2024-06-21 ENCOUNTER — HOSPITAL ENCOUNTER (OUTPATIENT)
Age: 54
Discharge: HOME | End: 2024-06-21
Payer: COMMERCIAL

## 2024-06-21 ENCOUNTER — TELEPHONE (OUTPATIENT)
Dept: FAMILY MEDICINE CLINIC | Age: 54
End: 2024-06-21

## 2024-06-21 DIAGNOSIS — E87.6 HYPOKALEMIA: Primary | ICD-10-CM

## 2024-06-21 DIAGNOSIS — E87.6 HYPOKALEMIA: ICD-10-CM

## 2024-06-21 LAB
ANION GAP SERPL CALCULATED.3IONS-SCNC: 14 MMOL/L (ref 7–16)
BUN SERPL-MCNC: 6 MG/DL (ref 6–20)
CALCIUM SERPL-MCNC: 9.6 MG/DL (ref 8.6–10.2)
CHLORIDE SERPL-SCNC: 98 MMOL/L (ref 98–107)
CREAT SERPL-MCNC: 0.8 MG/DL (ref 0.7–1.2)
GFR, ESTIMATED: >90 ML/MIN/1.73M2
POTASSIUM SERPL-SCNC: 3 MMOL/L (ref 3.5–5)
SODIUM SERPL-SCNC: 143 MMOL/L (ref 132–146)

## 2024-06-21 PROCEDURE — 80048 BASIC METABOLIC PNL TOTAL CA: CPT

## 2024-06-21 NOTE — TELEPHONE ENCOUNTER
Pt called back stating that he has only been taking Potassium Chloride 20 meq BID.  Pt was to increase med to 20 meq 2 tabs BID.  Pt states he will increase med and have K+ rechecked in 1 week.  Pt has appt on 6/28/24.    Please place order

## 2024-06-21 NOTE — TELEPHONE ENCOUNTER
Pt states can't return to work is on 24 hour oxygen  and short term disability and has a return to work excuse from his previous physician to go back to work on 07/1 and would like it extended. Please advise.

## 2024-06-28 ENCOUNTER — OFFICE VISIT (OUTPATIENT)
Dept: FAMILY MEDICINE CLINIC | Age: 54
End: 2024-06-28
Payer: COMMERCIAL

## 2024-06-28 VITALS
SYSTOLIC BLOOD PRESSURE: 136 MMHG | OXYGEN SATURATION: 97 % | HEIGHT: 71 IN | BODY MASS INDEX: 37.41 KG/M2 | WEIGHT: 267.2 LBS | HEART RATE: 87 BPM | DIASTOLIC BLOOD PRESSURE: 73 MMHG | TEMPERATURE: 98.6 F | RESPIRATION RATE: 18 BRPM

## 2024-06-28 DIAGNOSIS — J96.22 ACUTE ON CHRONIC RESPIRATORY FAILURE WITH HYPOXIA AND HYPERCAPNIA (HCC): ICD-10-CM

## 2024-06-28 DIAGNOSIS — E11.9 TYPE 2 DIABETES MELLITUS WITHOUT COMPLICATION, WITHOUT LONG-TERM CURRENT USE OF INSULIN (HCC): ICD-10-CM

## 2024-06-28 DIAGNOSIS — E87.6 HYPOKALEMIA: ICD-10-CM

## 2024-06-28 DIAGNOSIS — J96.21 ACUTE ON CHRONIC RESPIRATORY FAILURE WITH HYPOXIA AND HYPERCAPNIA (HCC): ICD-10-CM

## 2024-06-28 DIAGNOSIS — Z87.891 FORMER SMOKER: ICD-10-CM

## 2024-06-28 DIAGNOSIS — J18.9 COMMUNITY ACQUIRED PNEUMONIA OF RIGHT LOWER LOBE OF LUNG: Primary | ICD-10-CM

## 2024-06-28 DIAGNOSIS — E66.09 CLASS 2 OBESITY DUE TO EXCESS CALORIES WITHOUT SERIOUS COMORBIDITY WITH BODY MASS INDEX (BMI) OF 37.0 TO 37.9 IN ADULT: ICD-10-CM

## 2024-06-28 DIAGNOSIS — R59.0 MEDIASTINAL ADENOPATHY: ICD-10-CM

## 2024-06-28 PROCEDURE — G2211 COMPLEX E/M VISIT ADD ON: HCPCS | Performed by: NEUROMUSCULOSKELETAL MEDICINE & OMM

## 2024-06-28 PROCEDURE — 99214 OFFICE O/P EST MOD 30 MIN: CPT | Performed by: NEUROMUSCULOSKELETAL MEDICINE & OMM

## 2024-06-28 PROCEDURE — 3051F HG A1C>EQUAL 7.0%<8.0%: CPT | Performed by: NEUROMUSCULOSKELETAL MEDICINE & OMM

## 2024-06-28 RX ORDER — GLIPIZIDE 5 MG/1
TABLET, FILM COATED, EXTENDED RELEASE ORAL
COMMUNITY
Start: 2024-06-25

## 2024-06-28 ASSESSMENT — ENCOUNTER SYMPTOMS
DIARRHEA: 0
ABDOMINAL PAIN: 0
WHEEZING: 0
VOMITING: 0
SHORTNESS OF BREATH: 1
NAUSEA: 0
CHOKING: 0
BACK PAIN: 0
CHEST TIGHTNESS: 0
COUGH: 1

## 2024-06-28 NOTE — PROGRESS NOTES
Scott De Luna (:  1970) is a 53 y.o. male,Established patient, here for evaluation of the following chief complaint(s):  2 Week Follow-Up, Pneumonia, and Discuss Labs      Assessment & Plan   ASSESSMENT/PLAN:  1. Community acquired pneumonia of right lower lobe of lung  Comments:  persistent right greater than left lower lobe pneumonia.  Patient started on Levaquin 750 mg daily for 10 days per pulmonary.  2. Acute on chronic respiratory failure with hypoxia and hypercapnia (HCC)  Comments:  patient on 3liters per nasal cannula, continuous.  3. Former smoker  4. Mediastinal adenopathy  Comments:  repeat CT scan of chest ordered per Dr. Gilmore, pulmonologist, may need biopsy of lymph nodes.  5. Type 2 diabetes mellitus without complication, without long-term current use of insulin (HCC)  Comments:  Chronic condition well-controlled on metformin 500 mg daily with breakfast.  Last hemoglobin A1c was 7.3 on 2024.  6. Hypokalemia  Comments:  Patient to get recheck of BMP in a.m. and will treat accordingly.  7. Class 2 obesity due to excess calories without serious comorbidity with body mass index (BMI) of 37.0 to 37.9 in adult      Return in about 4 weeks (around 2024) for to monitor medical conditions.         Subjective   SUBJECTIVE/OBJECTIVE:  HPI 53-year-old male here for 2 Week Follow-Up, Pneumonia, and Discuss Labs    Review of Systems   Constitutional:  Negative for activity change, appetite change, chills, diaphoresis, fatigue, fever and unexpected weight change.   HENT:  Negative for dental problem.    Eyes:  Negative for visual disturbance.   Respiratory:  Positive for cough (productive cough, thick green phlegm) and shortness of breath. Negative for choking, chest tightness and wheezing.    Cardiovascular:  Positive for leg swelling. Negative for chest pain and palpitations.   Gastrointestinal:  Negative for abdominal pain, diarrhea, nausea and vomiting.   Genitourinary:  Negative

## 2024-07-03 ENCOUNTER — TELEPHONE (OUTPATIENT)
Dept: FAMILY MEDICINE CLINIC | Age: 54
End: 2024-07-03

## 2024-07-03 DIAGNOSIS — E11.9 TYPE 2 DIABETES MELLITUS WITHOUT COMPLICATION, WITHOUT LONG-TERM CURRENT USE OF INSULIN (HCC): Primary | ICD-10-CM

## 2024-07-08 ENCOUNTER — HOSPITAL ENCOUNTER (EMERGENCY)
Age: 54
Discharge: LEFT BEFORE DISCHARGE | End: 2024-07-08
Payer: COMMERCIAL

## 2024-07-08 ENCOUNTER — APPOINTMENT (OUTPATIENT)
Dept: GENERAL RADIOLOGY | Age: 54
End: 2024-07-08
Payer: COMMERCIAL

## 2024-07-08 VITALS
BODY MASS INDEX: 36.54 KG/M2 | WEIGHT: 261 LBS | HEIGHT: 71 IN | TEMPERATURE: 97 F | DIASTOLIC BLOOD PRESSURE: 72 MMHG | RESPIRATION RATE: 16 BRPM | OXYGEN SATURATION: 96 % | HEART RATE: 90 BPM | SYSTOLIC BLOOD PRESSURE: 115 MMHG

## 2024-07-08 PROCEDURE — 99283 EMERGENCY DEPT VISIT LOW MDM: CPT

## 2024-07-08 PROCEDURE — 71046 X-RAY EXAM CHEST 2 VIEWS: CPT

## 2024-07-08 ASSESSMENT — LIFESTYLE VARIABLES
HOW MANY STANDARD DRINKS CONTAINING ALCOHOL DO YOU HAVE ON A TYPICAL DAY: PATIENT DOES NOT DRINK
HOW OFTEN DO YOU HAVE A DRINK CONTAINING ALCOHOL: NEVER

## 2024-07-08 NOTE — ED TRIAGE NOTES
FIRST PROVIDER CONTACT ASSESSMENT NOTE       Department of Emergency Medicine                 First Provider Note            24  5:14 PM EDT    Date of Encounter: No admission date for patient encounter.    Patient Name: Scott De Luna  : 1970  MRN: [id number]    Chief Complaint: Nasal Congestion (States he feels like he's had pneumonia for 5 months. )      History of Present Illness:   Scott De Luna is a 53 y.o. male who presents to the ED for cough shortness of breath lower extremity swelling.    Focused Physical Exam:  VS:    ED Triage Vitals   BP Temp Temp src Pulse Resp SpO2 Height Weight   -- -- -- -- -- -- -- --        Physical Ex: Constitutional: Alert and non-toxic.    Medical History:  has a past medical history of Acute exacerbation of chronic obstructive pulmonary disease (COPD) (HCC), Acute respiratory failure with hypoxia (HCC), CAP (community acquired pneumonia), Hepatitis C reactive, Heroin abuse (HCC), and Tobacco abuse.  Surgical History:  has a past surgical history that includes Tonsillectomy.  Social History:  reports that he quit smoking about 5 weeks ago. His smoking use included cigarettes. He started smoking about 30 years ago. He has a 45.6 pack-year smoking history. He has been exposed to tobacco smoke. His smokeless tobacco use includes chew. He reports that he does not drink alcohol and does not use drugs.  Family History: family history includes Cancer in his father and mother.    Allergies: Patient has no known allergies.     Initial Plan of Care: Initiate Treatment-Testing, Proceed toTreatment Area When Bed Available for ED Attending/MLP to Continue Care      ---END OF FIRST PROVIDER CONTACT ASSESSMENT NOTE---  Electronically signed by Rosa Gayle PA-C   DD: 24

## 2024-07-09 NOTE — ED NOTES
Patient called to [facility], unable to be found in waiting room, restroom or parking lot.  Did not notify staff he was leaving.

## 2024-07-22 ENCOUNTER — OFFICE VISIT (OUTPATIENT)
Dept: FAMILY MEDICINE CLINIC | Age: 54
End: 2024-07-22
Payer: COMMERCIAL

## 2024-07-22 VITALS
RESPIRATION RATE: 22 BRPM | BODY MASS INDEX: 37.15 KG/M2 | HEIGHT: 71 IN | DIASTOLIC BLOOD PRESSURE: 64 MMHG | TEMPERATURE: 97.3 F | OXYGEN SATURATION: 93 % | WEIGHT: 265.4 LBS | SYSTOLIC BLOOD PRESSURE: 115 MMHG | HEART RATE: 68 BPM

## 2024-07-22 DIAGNOSIS — R59.0 MEDIASTINAL ADENOPATHY: ICD-10-CM

## 2024-07-22 DIAGNOSIS — E87.6 HYPOKALEMIA: ICD-10-CM

## 2024-07-22 DIAGNOSIS — J44.9 CHRONIC OBSTRUCTIVE PULMONARY DISEASE, UNSPECIFIED COPD TYPE (HCC): Primary | ICD-10-CM

## 2024-07-22 DIAGNOSIS — E11.9 TYPE 2 DIABETES MELLITUS WITHOUT COMPLICATION, WITHOUT LONG-TERM CURRENT USE OF INSULIN (HCC): ICD-10-CM

## 2024-07-22 PROCEDURE — G2211 COMPLEX E/M VISIT ADD ON: HCPCS | Performed by: NEUROMUSCULOSKELETAL MEDICINE & OMM

## 2024-07-22 PROCEDURE — 99214 OFFICE O/P EST MOD 30 MIN: CPT | Performed by: NEUROMUSCULOSKELETAL MEDICINE & OMM

## 2024-07-22 PROCEDURE — 3051F HG A1C>EQUAL 7.0%<8.0%: CPT | Performed by: NEUROMUSCULOSKELETAL MEDICINE & OMM

## 2024-07-22 RX ORDER — TIOTROPIUM BROMIDE 18 UG/1
18 CAPSULE ORAL; RESPIRATORY (INHALATION) DAILY
Qty: 30 CAPSULE | Refills: 3 | Status: SHIPPED
Start: 2024-07-22 | End: 2024-07-23 | Stop reason: SDUPTHER

## 2024-07-22 ASSESSMENT — ENCOUNTER SYMPTOMS
CHOKING: 0
SHORTNESS OF BREATH: 1
COUGH: 1
DIARRHEA: 0
ABDOMINAL PAIN: 0
WHEEZING: 1
CONSTIPATION: 0
NAUSEA: 0
VOMITING: 0
CHEST TIGHTNESS: 0
ABDOMINAL DISTENTION: 0

## 2024-07-22 NOTE — PROGRESS NOTES
Diabetic retinal exam  Never done    Hepatitis A vaccine (1 of 2 - Risk 2-dose series) Never done    DTaP/Tdap/Td vaccine (1 - Tdap) Never done    Colorectal Cancer Screen  Never done    Shingles vaccine (1 of 2) Never done    COVID-19 Vaccine (3 - 2023-24 season) 09/01/2023     Last colonoscopy No colonoscopy on file   No cologuard on file  No FIT/FOBT on file   No flexible sigmoidoscopy on file   Last mammogram No breast cancer screening on file   Last dexa bone scan none found.  No cervical cancer screening on file   Last PSA               Past Medical History:   Diagnosis Date    Acute exacerbation of chronic obstructive pulmonary disease (COPD) (HCC) 3/14/2014    Acute respiratory failure with hypoxia (HCC) 3/14/2014    CAP (community acquired pneumonia) 3/14/2014    Hepatitis C reactive 3/15/2014    Heroin abuse (HCC) 3/14/2014    Tobacco abuse 3/14/2014        Past Surgical History:   Procedure Laterality Date    TONSILLECTOMY          The ASCVD Risk score (Tom ESTEVES, et al., 2019) failed to calculate for the following reasons:    Cannot find a previous HDL lab    Cannot find a previous total cholesterol lab     Educational materials and/or home exercises printed for patient's review and were included inpatient instructions on his/her After Visit Summary and given to patient at the end of visit.     regarding above diagnosis, including possible risks and complications,  especially if left uncontrolled.     Counseled regarding the possible side effects, risks, benefits and alternatives to treatment; patient and/or guardian verbalizes understanding, agrees, feels comfortable with and wishes to proceed withabove treatment plan.     Advised patient to call with any new medication issues, and read all Rx infofrom pharmacy to assure aware of all possible risks and side effects of medication before taking.   age and gender appropriate health screening exams and vaccinations.  Advised patient regarding importance of

## 2024-07-23 ENCOUNTER — HOSPITAL ENCOUNTER (OUTPATIENT)
Age: 54
Discharge: HOME | End: 2024-07-23
Payer: COMMERCIAL

## 2024-07-23 DIAGNOSIS — J44.9 CHRONIC OBSTRUCTIVE PULMONARY DISEASE, UNSPECIFIED COPD TYPE (HCC): ICD-10-CM

## 2024-07-23 DIAGNOSIS — E87.6 HYPOKALEMIA: Primary | ICD-10-CM

## 2024-07-23 DIAGNOSIS — E87.6 HYPOKALEMIA: ICD-10-CM

## 2024-07-23 LAB
ANION GAP SERPL CALCULATED.3IONS-SCNC: 10 MMOL/L (ref 7–16)
BUN SERPL-MCNC: 6 MG/DL (ref 6–20)
CALCIUM SERPL-MCNC: 9.3 MG/DL (ref 8.6–10.2)
CHLORIDE SERPL-SCNC: 95 MMOL/L (ref 98–107)
CO2 SERPL-SCNC: 32 MMOL/L (ref 22–29)
CREAT SERPL-MCNC: 0.6 MG/DL (ref 0.7–1.2)
GFR, ESTIMATED: >90 ML/MIN/1.73M2
GLUCOSE SERPL-MCNC: 244 MG/DL (ref 74–99)
POTASSIUM SERPL-SCNC: 3.3 MMOL/L (ref 3.5–5)
SODIUM SERPL-SCNC: 137 MMOL/L (ref 132–146)

## 2024-07-23 PROCEDURE — 36415 COLL VENOUS BLD VENIPUNCTURE: CPT

## 2024-07-23 PROCEDURE — 80048 BASIC METABOLIC PNL TOTAL CA: CPT

## 2024-07-23 RX ORDER — TIOTROPIUM BROMIDE 18 UG/1
18 CAPSULE ORAL; RESPIRATORY (INHALATION) DAILY
Qty: 30 CAPSULE | Refills: 3 | Status: SHIPPED | OUTPATIENT
Start: 2024-07-23

## 2024-08-01 ENCOUNTER — TELEPHONE (OUTPATIENT)
Dept: FAMILY MEDICINE CLINIC | Age: 54
End: 2024-08-01

## 2024-08-01 RX ORDER — BUMETANIDE 2 MG/1
2 TABLET ORAL 2 TIMES DAILY
Qty: 30 TABLET | Refills: 3 | Status: SHIPPED | OUTPATIENT
Start: 2024-08-01

## 2024-08-05 ENCOUNTER — OFFICE VISIT (OUTPATIENT)
Dept: FAMILY MEDICINE CLINIC | Age: 54
End: 2024-08-05
Payer: COMMERCIAL

## 2024-08-05 VITALS
DIASTOLIC BLOOD PRESSURE: 55 MMHG | RESPIRATION RATE: 22 BRPM | HEIGHT: 71 IN | HEART RATE: 72 BPM | WEIGHT: 296 LBS | SYSTOLIC BLOOD PRESSURE: 109 MMHG | BODY MASS INDEX: 41.44 KG/M2 | OXYGEN SATURATION: 94 % | TEMPERATURE: 97.7 F

## 2024-08-05 DIAGNOSIS — E11.9 TYPE 2 DIABETES MELLITUS WITHOUT COMPLICATION, WITHOUT LONG-TERM CURRENT USE OF INSULIN (HCC): ICD-10-CM

## 2024-08-05 DIAGNOSIS — Z87.891 FORMER SMOKER: ICD-10-CM

## 2024-08-05 DIAGNOSIS — R59.0 MEDIASTINAL ADENOPATHY: ICD-10-CM

## 2024-08-05 DIAGNOSIS — E87.6 HYPOKALEMIA: ICD-10-CM

## 2024-08-05 DIAGNOSIS — J44.9 CHRONIC OBSTRUCTIVE PULMONARY DISEASE, UNSPECIFIED COPD TYPE (HCC): Primary | ICD-10-CM

## 2024-08-05 PROCEDURE — 3051F HG A1C>EQUAL 7.0%<8.0%: CPT | Performed by: NEUROMUSCULOSKELETAL MEDICINE & OMM

## 2024-08-05 PROCEDURE — 99214 OFFICE O/P EST MOD 30 MIN: CPT | Performed by: NEUROMUSCULOSKELETAL MEDICINE & OMM

## 2024-08-05 PROCEDURE — G2211 COMPLEX E/M VISIT ADD ON: HCPCS | Performed by: NEUROMUSCULOSKELETAL MEDICINE & OMM

## 2024-08-05 RX ORDER — BLOOD SUGAR DIAGNOSTIC
STRIP MISCELLANEOUS
COMMUNITY
Start: 2024-07-27

## 2024-08-05 RX ORDER — LANCETS 33 GAUGE
EACH MISCELLANEOUS
COMMUNITY
Start: 2024-07-27

## 2024-08-05 ASSESSMENT — ENCOUNTER SYMPTOMS
WHEEZING: 0
ABDOMINAL PAIN: 0
SHORTNESS OF BREATH: 1
VOMITING: 0
COUGH: 1
ABDOMINAL DISTENTION: 0
CONSTIPATION: 0
CHOKING: 0
NAUSEA: 0
DIARRHEA: 0
BLOOD IN STOOL: 0
CHEST TIGHTNESS: 0

## 2024-08-05 NOTE — PROGRESS NOTES
Scott De Luna (:  1970) is a 53 y.o. male,Established patient, here for evaluation of the following chief complaint(s):  COPD (Follow up/Wants released to RTW/)      Assessment & Plan   ASSESSMENT/PLAN:  1. Chronic obstructive pulmonary disease, unspecified COPD type (HCC)  Comments:  Chronic condition vastly improved with using Spiriva as directed.  Patient wishes to go back to work without restrictions.  2. Mediastinal adenopathy  Comments:  Patient to get CAT scan of the chest and early  and then follow-up with pulmonologist for further evaluation/treatment options.  3. Type 2 diabetes mellitus without complication, without long-term current use of insulin (HCC)  Comments:  Chronic condition well-controlled patient will get hemoglobin A1c in approximately 3 weeks, will treat accordingly.  Orders:  -     POCT glycosylated hemoglobin (Hb A1C); Future  4. Former smoker  Comments:  quit smoking about 2 months ago, used the patches. He has started to use \"chewing tobacco\" over last 2 months.  5. Hypokalemia  Comments:  Patient highly advised to get BMP today or tomorrow to make this to recheck potassium level.  Currently taking potassium supplements.      Return in about 3 months (around 2024) for to monitor medical conditions.         Subjective   SUBJECTIVE/OBJECTIVE:  HPI 53-year-old male here for COPD (Follow up/Wants released to RTW/)    Review of Systems   Constitutional:  Negative for activity change, appetite change, chills, diaphoresis, fatigue and fever.   HENT:  Negative for dental problem.    Eyes:  Negative for visual disturbance.   Respiratory:  Positive for cough (no longer using the oxygen anymore, still with \" a little bit \" of SOB and cough, but the Spiriva helped his breathing out a ton.) and shortness of breath. Negative for choking, chest tightness and wheezing.    Cardiovascular:  Negative for chest pain, palpitations and leg swelling.   Gastrointestinal:

## 2024-08-22 ENCOUNTER — HOSPITAL ENCOUNTER (OUTPATIENT)
Dept: CT IMAGING | Age: 54
Discharge: HOME | End: 2024-08-24
Attending: INTERNAL MEDICINE
Payer: COMMERCIAL

## 2024-08-22 DIAGNOSIS — R59.0 MEDIASTINAL ADENOPATHY: ICD-10-CM

## 2024-08-22 PROCEDURE — 71250 CT THORAX DX C-: CPT

## 2024-10-26 DIAGNOSIS — J44.9 CHRONIC OBSTRUCTIVE PULMONARY DISEASE, UNSPECIFIED COPD TYPE (HCC): ICD-10-CM

## 2024-10-28 RX ORDER — TIOTROPIUM BROMIDE 18 UG/1
18 CAPSULE ORAL; RESPIRATORY (INHALATION) DAILY
Qty: 30 CAPSULE | Refills: 3 | OUTPATIENT
Start: 2024-10-28

## 2024-11-05 ENCOUNTER — PATIENT MESSAGE (OUTPATIENT)
Dept: FAMILY MEDICINE CLINIC | Age: 54
End: 2024-11-05

## 2024-11-05 DIAGNOSIS — J44.9 CHRONIC OBSTRUCTIVE PULMONARY DISEASE, UNSPECIFIED COPD TYPE (HCC): ICD-10-CM

## 2024-11-05 RX ORDER — TIOTROPIUM BROMIDE 18 UG/1
18 CAPSULE ORAL; RESPIRATORY (INHALATION) DAILY
Qty: 30 CAPSULE | Refills: 3 | Status: SHIPPED | OUTPATIENT
Start: 2024-11-05

## 2024-11-05 NOTE — TELEPHONE ENCOUNTER
Spiriva 18 Mcg      Cap Boeh   Sig: INHALE ONE CAPSULE VIA HANDIHALER INTO THE LUNGS DAILY   Dispensed: 10/9/2024 12:00 AM   Unit strength: 18 mcg   Days supply: 30   Quantity: 30 each   Refills remainin   Pharmacy: [id number] - [place], [place] - [address] [facility] [phone] -  [phone]  [address] [place] [id number]  Phone: [phone]  Fax: [phone]     Name of Medication(s) Requested:  Requested Prescriptions     Pending Prescriptions Disp Refills    tiotropium (SPIRIVA HANDIHALER) 18 MCG inhalation capsule 30 capsule 3     Sig: Inhale 1 capsule into the lungs daily       Medication is on current medication list Yes    Dosage and directions were verified? Yes    Quantity verified: 30 day supply     Pharmacy Verified?  Yes    Last Appointment:  2024    Future appts:  Future Appointments   Date Time Provider Department Center   2024 10:00 AM Nic Patel DO [place] [facility] E[facility] DEP        (If no appt send self scheduling link. .REFILLAPPT)  Scheduling request sent?     [] Yes  [x] No    Does patient need updated?  [] Yes  [x] No

## 2025-02-15 ENCOUNTER — HOSPITAL ENCOUNTER (INPATIENT)
Age: 55
LOS: 1 days | Discharge: HOME | DRG: 193 | End: 2025-02-17
Attending: EMERGENCY MEDICINE | Admitting: HOSPITALIST
Payer: COMMERCIAL

## 2025-02-15 ENCOUNTER — APPOINTMENT (OUTPATIENT)
Dept: CT IMAGING | Age: 55
DRG: 193 | End: 2025-02-15
Payer: COMMERCIAL

## 2025-02-15 ENCOUNTER — APPOINTMENT (OUTPATIENT)
Dept: GENERAL RADIOLOGY | Age: 55
DRG: 193 | End: 2025-02-15
Payer: COMMERCIAL

## 2025-02-15 DIAGNOSIS — J10.1 INFLUENZA A: Primary | ICD-10-CM

## 2025-02-15 DIAGNOSIS — J44.1 COPD EXACERBATION (HCC): ICD-10-CM

## 2025-02-15 LAB
BASOPHILS # BLD: 0.01 K/UL (ref 0–0.2)
BASOPHILS NFR BLD: 0 % (ref 0–2)
EOSINOPHIL # BLD: 0 K/UL (ref 0.05–0.5)
EOSINOPHILS RELATIVE PERCENT: 0 % (ref 0–6)
ERYTHROCYTE [DISTWIDTH] IN BLOOD BY AUTOMATED COUNT: 14.5 % (ref 11.5–15)
HCT VFR BLD AUTO: 46.1 % (ref 37–54)
HGB BLD-MCNC: 14.4 G/DL (ref 12.5–16.5)
IMM GRANULOCYTES # BLD AUTO: 0.08 K/UL (ref 0–0.58)
IMM GRANULOCYTES NFR BLD: 1 % (ref 0–5)
LYMPHOCYTES NFR BLD: 1.65 K/UL (ref 1.5–4)
LYMPHOCYTES RELATIVE PERCENT: 12 % (ref 20–42)
MCH RBC QN AUTO: 30.2 PG (ref 26–35)
MCHC RBC AUTO-ENTMCNC: 31.2 G/DL (ref 32–34.5)
MCV RBC AUTO: 96.6 FL (ref 80–99.9)
MONOCYTES NFR BLD: 0.57 K/UL (ref 0.1–0.95)
MONOCYTES NFR BLD: 4 % (ref 2–12)
NEUTROPHILS NFR BLD: 83 % (ref 43–80)
NEUTS SEG NFR BLD: 11.34 K/UL (ref 1.8–7.3)
PLATELET # BLD AUTO: 187 K/UL (ref 130–450)
PMV BLD AUTO: 11.3 FL (ref 7–12)
RBC # BLD AUTO: 4.77 M/UL (ref 3.8–5.8)
WBC OTHER # BLD: 13.7 K/UL (ref 4.5–11.5)

## 2025-02-15 PROCEDURE — 87502 INFLUENZA DNA AMP PROBE: CPT

## 2025-02-15 PROCEDURE — 83036 HEMOGLOBIN GLYCOSYLATED A1C: CPT

## 2025-02-15 PROCEDURE — 80053 COMPREHEN METABOLIC PANEL: CPT

## 2025-02-15 PROCEDURE — 99285 EMERGENCY DEPT VISIT HI MDM: CPT

## 2025-02-15 PROCEDURE — 93005 ELECTROCARDIOGRAM TRACING: CPT | Performed by: EMERGENCY MEDICINE

## 2025-02-15 PROCEDURE — 71045 X-RAY EXAM CHEST 1 VIEW: CPT

## 2025-02-15 PROCEDURE — 87634 RSV DNA/RNA AMP PROBE: CPT

## 2025-02-15 PROCEDURE — 85025 COMPLETE CBC W/AUTO DIFF WBC: CPT

## 2025-02-15 PROCEDURE — 6370000000 HC RX 637 (ALT 250 FOR IP)

## 2025-02-15 PROCEDURE — 0202U NFCT DS 22 TRGT SARS-COV-2: CPT

## 2025-02-15 PROCEDURE — 87635 SARS-COV-2 COVID-19 AMP PRB: CPT

## 2025-02-15 PROCEDURE — 6370000000 HC RX 637 (ALT 250 FOR IP): Performed by: EMERGENCY MEDICINE

## 2025-02-15 PROCEDURE — 84484 ASSAY OF TROPONIN QUANT: CPT

## 2025-02-15 PROCEDURE — 83880 ASSAY OF NATRIURETIC PEPTIDE: CPT

## 2025-02-15 RX ORDER — IPRATROPIUM BROMIDE AND ALBUTEROL SULFATE 2.5; .5 MG/3ML; MG/3ML
3 SOLUTION RESPIRATORY (INHALATION) ONCE
Status: COMPLETED | OUTPATIENT
Start: 2025-02-15 | End: 2025-02-15

## 2025-02-15 RX ORDER — METHYLPREDNISOLONE SODIUM SUCCINATE 125 MG/2ML
125 INJECTION INTRAMUSCULAR; INTRAVENOUS ONCE
Status: COMPLETED | OUTPATIENT
Start: 2025-02-15 | End: 2025-02-16

## 2025-02-15 RX ORDER — ACETAMINOPHEN 500 MG
1000 TABLET ORAL ONCE
Status: COMPLETED | OUTPATIENT
Start: 2025-02-15 | End: 2025-02-15

## 2025-02-15 RX ORDER — MAGNESIUM SULFATE IN WATER 40 MG/ML
2000 INJECTION, SOLUTION INTRAVENOUS ONCE
Status: COMPLETED | OUTPATIENT
Start: 2025-02-15 | End: 2025-02-16

## 2025-02-15 RX ADMIN — ACETAMINOPHEN 1000 MG: 500 TABLET ORAL at 23:31

## 2025-02-15 RX ADMIN — IPRATROPIUM BROMIDE AND ALBUTEROL SULFATE 3 DOSE: 2.5; .5 SOLUTION RESPIRATORY (INHALATION) at 23:34

## 2025-02-15 ASSESSMENT — PAIN SCALES - GENERAL: PAINLEVEL_OUTOF10: 8

## 2025-02-16 ENCOUNTER — APPOINTMENT (OUTPATIENT)
Dept: CT IMAGING | Age: 55
DRG: 193 | End: 2025-02-16
Payer: COMMERCIAL

## 2025-02-16 PROBLEM — J44.1 COPD WITH ACUTE EXACERBATION (HCC): Status: ACTIVE | Noted: 2025-02-16

## 2025-02-16 PROBLEM — J10.1 INFLUENZA A: Chronic | Status: ACTIVE | Noted: 2025-02-16

## 2025-02-16 PROBLEM — J44.1 COPD WITH ACUTE EXACERBATION (HCC): Chronic | Status: ACTIVE | Noted: 2025-02-16

## 2025-02-16 PROBLEM — E11.9 TYPE 2 DIABETES MELLITUS (HCC): Status: ACTIVE | Noted: 2025-02-16

## 2025-02-16 PROBLEM — J10.1 INFLUENZA A: Status: ACTIVE | Noted: 2025-02-16

## 2025-02-16 LAB
ALBUMIN SERPL-MCNC: 4 G/DL (ref 3.5–5.2)
ALP SERPL-CCNC: 97 U/L (ref 40–129)
ALT SERPL-CCNC: 21 U/L (ref 0–40)
ANION GAP SERPL CALCULATED.3IONS-SCNC: 12 MMOL/L (ref 7–16)
AST SERPL-CCNC: 33 U/L (ref 0–39)
B PARAP IS1001 DNA NPH QL NAA+NON-PROBE: NOT DETECTED
B PERT DNA SPEC QL NAA+PROBE: NOT DETECTED
BILIRUB SERPL-MCNC: 0.3 MG/DL (ref 0–1.2)
BNP SERPL-MCNC: 588 PG/ML (ref 0–125)
BUN SERPL-MCNC: 9 MG/DL (ref 6–20)
C PNEUM DNA NPH QL NAA+NON-PROBE: NOT DETECTED
CALCIUM SERPL-MCNC: 8.7 MG/DL (ref 8.6–10.2)
CHLORIDE SERPL-SCNC: 96 MMOL/L (ref 98–107)
CHP ED QC CHECK: NORMAL
CHP ED QC CHECK: NORMAL
CO2 SERPL-SCNC: 26 MMOL/L (ref 22–29)
CREAT SERPL-MCNC: 0.6 MG/DL (ref 0.7–1.2)
FLUAV H3 RNA NPH QL NAA+NON-PROBE: DETECTED
FLUAV RNA NPH QL NAA+NON-PROBE: DETECTED
FLUBV RNA NPH QL NAA+NON-PROBE: NOT DETECTED
GFR, ESTIMATED: >90 ML/MIN/1.73M2
GLUCOSE BLD-MCNC: 205 MG/DL
GLUCOSE BLD-MCNC: 205 MG/DL (ref 74–99)
GLUCOSE BLD-MCNC: 215 MG/DL
GLUCOSE BLD-MCNC: 215 MG/DL (ref 74–99)
GLUCOSE BLD-MCNC: 234 MG/DL (ref 74–99)
GLUCOSE BLD-MCNC: 277 MG/DL (ref 74–99)
GLUCOSE BLD-MCNC: 316 MG/DL (ref 74–99)
GLUCOSE SERPL-MCNC: 127 MG/DL (ref 74–99)
HADV DNA NPH QL NAA+NON-PROBE: NOT DETECTED
HBA1C MFR BLD: 6.4 % (ref 4–5.6)
HCOV 229E RNA NPH QL NAA+NON-PROBE: NOT DETECTED
HCOV HKU1 RNA NPH QL NAA+NON-PROBE: NOT DETECTED
HCOV NL63 RNA NPH QL NAA+NON-PROBE: NOT DETECTED
HCOV OC43 RNA NPH QL NAA+NON-PROBE: NOT DETECTED
HMPV RNA NPH QL NAA+NON-PROBE: NOT DETECTED
HPIV1 RNA NPH QL NAA+NON-PROBE: NOT DETECTED
HPIV2 RNA NPH QL NAA+NON-PROBE: NOT DETECTED
HPIV3 RNA NPH QL NAA+NON-PROBE: NOT DETECTED
HPIV4 RNA NPH QL NAA+NON-PROBE: NOT DETECTED
INFLUENZA A BY PCR: DETECTED
INFLUENZA B BY PCR: NOT DETECTED
M PNEUMO DNA NPH QL NAA+NON-PROBE: NOT DETECTED
POTASSIUM SERPL-SCNC: 4.1 MMOL/L (ref 3.5–5)
PROT SERPL-MCNC: 8.6 G/DL (ref 6.4–8.3)
RSV BY PCR: NOT DETECTED
RSV RNA NPH QL NAA+NON-PROBE: NOT DETECTED
RV+EV RNA NPH QL NAA+NON-PROBE: NOT DETECTED
SARS-COV-2 RDRP RESP QL NAA+PROBE: NOT DETECTED
SARS-COV-2 RNA NPH QL NAA+NON-PROBE: NOT DETECTED
SODIUM SERPL-SCNC: 134 MMOL/L (ref 132–146)
SPECIMEN DESCRIPTION: ABNORMAL
SPECIMEN DESCRIPTION: NORMAL
SPECIMEN SOURCE: NORMAL
TROPONIN I SERPL HS-MCNC: 6 NG/L (ref 0–11)

## 2025-02-16 PROCEDURE — 96365 THER/PROPH/DIAG IV INF INIT: CPT

## 2025-02-16 PROCEDURE — 6370000000 HC RX 637 (ALT 250 FOR IP): Performed by: HOSPITALIST

## 2025-02-16 PROCEDURE — 70450 CT HEAD/BRAIN W/O DYE: CPT

## 2025-02-16 PROCEDURE — 6360000002 HC RX W HCPCS

## 2025-02-16 PROCEDURE — 2580000003 HC RX 258: Performed by: HOSPITALIST

## 2025-02-16 PROCEDURE — 2700000000 HC OXYGEN THERAPY PER DAY

## 2025-02-16 PROCEDURE — 94640 AIRWAY INHALATION TREATMENT: CPT

## 2025-02-16 PROCEDURE — 1200000000 HC SEMI PRIVATE

## 2025-02-16 PROCEDURE — 6360000002 HC RX W HCPCS: Performed by: INTERNAL MEDICINE

## 2025-02-16 PROCEDURE — 6370000000 HC RX 637 (ALT 250 FOR IP): Performed by: INTERNAL MEDICINE

## 2025-02-16 PROCEDURE — 96375 TX/PRO/DX INJ NEW DRUG ADDON: CPT

## 2025-02-16 PROCEDURE — 6360000002 HC RX W HCPCS: Performed by: HOSPITALIST

## 2025-02-16 PROCEDURE — 99223 1ST HOSP IP/OBS HIGH 75: CPT | Performed by: HOSPITALIST

## 2025-02-16 PROCEDURE — 2500000003 HC RX 250 WO HCPCS: Performed by: HOSPITALIST

## 2025-02-16 PROCEDURE — 6370000000 HC RX 637 (ALT 250 FOR IP)

## 2025-02-16 PROCEDURE — 6360000002 HC RX W HCPCS: Performed by: EMERGENCY MEDICINE

## 2025-02-16 PROCEDURE — 82962 GLUCOSE BLOOD TEST: CPT

## 2025-02-16 RX ORDER — POLYETHYLENE GLYCOL 3350 17 G/17G
17 POWDER, FOR SOLUTION ORAL DAILY PRN
Status: DISCONTINUED | OUTPATIENT
Start: 2025-02-16 | End: 2025-02-17 | Stop reason: HOSPADM

## 2025-02-16 RX ORDER — GLUCAGON 1 MG/ML
1 KIT INJECTION PRN
Status: DISCONTINUED | OUTPATIENT
Start: 2025-02-16 | End: 2025-02-17 | Stop reason: HOSPADM

## 2025-02-16 RX ORDER — MAGNESIUM HYDROXIDE/ALUMINUM HYDROXICE/SIMETHICONE 120; 1200; 1200 MG/30ML; MG/30ML; MG/30ML
30 SUSPENSION ORAL EVERY 6 HOURS PRN
Status: DISCONTINUED | OUTPATIENT
Start: 2025-02-16 | End: 2025-02-17 | Stop reason: HOSPADM

## 2025-02-16 RX ORDER — SODIUM CHLORIDE 9 MG/ML
INJECTION, SOLUTION INTRAVENOUS CONTINUOUS
Status: DISCONTINUED | OUTPATIENT
Start: 2025-02-16 | End: 2025-02-17

## 2025-02-16 RX ORDER — POTASSIUM CHLORIDE 1500 MG/1
40 TABLET, EXTENDED RELEASE ORAL 2 TIMES DAILY WITH MEALS
Status: DISCONTINUED | OUTPATIENT
Start: 2025-02-16 | End: 2025-02-17 | Stop reason: HOSPADM

## 2025-02-16 RX ORDER — GABAPENTIN 300 MG/1
600 CAPSULE ORAL 3 TIMES DAILY
Status: DISCONTINUED | OUTPATIENT
Start: 2025-02-16 | End: 2025-02-17 | Stop reason: HOSPADM

## 2025-02-16 RX ORDER — OSELTAMIVIR PHOSPHATE 75 MG/1
75 CAPSULE ORAL 2 TIMES DAILY
Status: DISCONTINUED | OUTPATIENT
Start: 2025-02-16 | End: 2025-02-17 | Stop reason: HOSPADM

## 2025-02-16 RX ORDER — BUMETANIDE 1 MG/1
2 TABLET ORAL 2 TIMES DAILY
Status: DISCONTINUED | OUTPATIENT
Start: 2025-02-16 | End: 2025-02-17 | Stop reason: HOSPADM

## 2025-02-16 RX ORDER — INSULIN LISPRO 100 [IU]/ML
0-4 INJECTION, SOLUTION INTRAVENOUS; SUBCUTANEOUS
Status: DISCONTINUED | OUTPATIENT
Start: 2025-02-16 | End: 2025-02-17 | Stop reason: HOSPADM

## 2025-02-16 RX ORDER — IPRATROPIUM BROMIDE AND ALBUTEROL SULFATE 2.5; .5 MG/3ML; MG/3ML
1 SOLUTION RESPIRATORY (INHALATION)
Status: DISCONTINUED | OUTPATIENT
Start: 2025-02-16 | End: 2025-02-17 | Stop reason: HOSPADM

## 2025-02-16 RX ORDER — METHYLPREDNISOLONE SODIUM SUCCINATE 40 MG/ML
40 INJECTION INTRAMUSCULAR; INTRAVENOUS EVERY 6 HOURS
Status: COMPLETED | OUTPATIENT
Start: 2025-02-16 | End: 2025-02-16

## 2025-02-16 RX ORDER — SODIUM CHLORIDE 0.9 % (FLUSH) 0.9 %
5-40 SYRINGE (ML) INJECTION EVERY 12 HOURS SCHEDULED
Status: DISCONTINUED | OUTPATIENT
Start: 2025-02-16 | End: 2025-02-17 | Stop reason: HOSPADM

## 2025-02-16 RX ORDER — OSELTAMIVIR PHOSPHATE 75 MG/1
75 CAPSULE ORAL ONCE
Status: COMPLETED | OUTPATIENT
Start: 2025-02-16 | End: 2025-02-16

## 2025-02-16 RX ORDER — ACETAMINOPHEN 325 MG/1
650 TABLET ORAL EVERY 6 HOURS PRN
Status: DISCONTINUED | OUTPATIENT
Start: 2025-02-16 | End: 2025-02-17 | Stop reason: HOSPADM

## 2025-02-16 RX ORDER — ARFORMOTEROL TARTRATE 15 UG/2ML
15 SOLUTION RESPIRATORY (INHALATION)
Status: DISCONTINUED | OUTPATIENT
Start: 2025-02-16 | End: 2025-02-17 | Stop reason: HOSPADM

## 2025-02-16 RX ORDER — BUPRENORPHINE HYDROCHLORIDE AND NALOXONE HYDROCHLORIDE DIHYDRATE 8; 2 MG/1; MG/1
1 TABLET SUBLINGUAL 2 TIMES DAILY
Status: DISCONTINUED | OUTPATIENT
Start: 2025-02-16 | End: 2025-02-17 | Stop reason: HOSPADM

## 2025-02-16 RX ORDER — ONDANSETRON 4 MG/1
4 TABLET, ORALLY DISINTEGRATING ORAL EVERY 8 HOURS PRN
Status: DISCONTINUED | OUTPATIENT
Start: 2025-02-16 | End: 2025-02-17 | Stop reason: HOSPADM

## 2025-02-16 RX ORDER — SODIUM CHLORIDE 0.9 % (FLUSH) 0.9 %
5-40 SYRINGE (ML) INJECTION PRN
Status: DISCONTINUED | OUTPATIENT
Start: 2025-02-16 | End: 2025-02-17 | Stop reason: HOSPADM

## 2025-02-16 RX ORDER — GUAIFENESIN/DEXTROMETHORPHAN 100-10MG/5
10 SYRUP ORAL EVERY 6 HOURS PRN
Status: DISCONTINUED | OUTPATIENT
Start: 2025-02-16 | End: 2025-02-17 | Stop reason: HOSPADM

## 2025-02-16 RX ORDER — ENOXAPARIN SODIUM 100 MG/ML
30 INJECTION SUBCUTANEOUS 2 TIMES DAILY
Status: DISCONTINUED | OUTPATIENT
Start: 2025-02-16 | End: 2025-02-17 | Stop reason: HOSPADM

## 2025-02-16 RX ORDER — BUDESONIDE 0.5 MG/2ML
0.5 INHALANT ORAL
Status: DISCONTINUED | OUTPATIENT
Start: 2025-02-16 | End: 2025-02-17 | Stop reason: HOSPADM

## 2025-02-16 RX ORDER — IPRATROPIUM BROMIDE AND ALBUTEROL SULFATE 2.5; .5 MG/3ML; MG/3ML
1 SOLUTION RESPIRATORY (INHALATION) 2 TIMES DAILY PRN
Status: DISCONTINUED | OUTPATIENT
Start: 2025-02-16 | End: 2025-02-17 | Stop reason: HOSPADM

## 2025-02-16 RX ORDER — SODIUM CHLORIDE 9 MG/ML
INJECTION, SOLUTION INTRAVENOUS PRN
Status: DISCONTINUED | OUTPATIENT
Start: 2025-02-16 | End: 2025-02-17 | Stop reason: HOSPADM

## 2025-02-16 RX ORDER — DEXTROSE MONOHYDRATE 100 MG/ML
INJECTION, SOLUTION INTRAVENOUS CONTINUOUS PRN
Status: DISCONTINUED | OUTPATIENT
Start: 2025-02-16 | End: 2025-02-17 | Stop reason: HOSPADM

## 2025-02-16 RX ORDER — ONDANSETRON 2 MG/ML
4 INJECTION INTRAMUSCULAR; INTRAVENOUS EVERY 6 HOURS PRN
Status: DISCONTINUED | OUTPATIENT
Start: 2025-02-16 | End: 2025-02-17 | Stop reason: HOSPADM

## 2025-02-16 RX ORDER — BENZONATATE 100 MG/1
100 CAPSULE ORAL 3 TIMES DAILY PRN
Status: DISCONTINUED | OUTPATIENT
Start: 2025-02-16 | End: 2025-02-17 | Stop reason: HOSPADM

## 2025-02-16 RX ADMIN — IPRATROPIUM BROMIDE AND ALBUTEROL SULFATE 1 DOSE: .5; 2.5 SOLUTION RESPIRATORY (INHALATION) at 15:09

## 2025-02-16 RX ADMIN — ENOXAPARIN SODIUM 30 MG: 100 INJECTION SUBCUTANEOUS at 20:58

## 2025-02-16 RX ADMIN — METHYLPREDNISOLONE SODIUM SUCCINATE 40 MG: 40 INJECTION, POWDER, LYOPHILIZED, FOR SOLUTION INTRAMUSCULAR; INTRAVENOUS at 08:49

## 2025-02-16 RX ADMIN — SODIUM CHLORIDE, PRESERVATIVE FREE 10 ML: 5 INJECTION INTRAVENOUS at 09:00

## 2025-02-16 RX ADMIN — METHYLPREDNISOLONE SODIUM SUCCINATE 125 MG: 125 INJECTION INTRAMUSCULAR; INTRAVENOUS at 01:02

## 2025-02-16 RX ADMIN — IPRATROPIUM BROMIDE AND ALBUTEROL SULFATE 1 DOSE: .5; 2.5 SOLUTION RESPIRATORY (INHALATION) at 20:32

## 2025-02-16 RX ADMIN — OSELTAMIVIR PHOSPHATE 75 MG: 75 CAPSULE ORAL at 20:57

## 2025-02-16 RX ADMIN — SODIUM CHLORIDE, PRESERVATIVE FREE 10 ML: 5 INJECTION INTRAVENOUS at 20:58

## 2025-02-16 RX ADMIN — IPRATROPIUM BROMIDE AND ALBUTEROL SULFATE 1 DOSE: .5; 2.5 SOLUTION RESPIRATORY (INHALATION) at 12:46

## 2025-02-16 RX ADMIN — IPRATROPIUM BROMIDE AND ALBUTEROL SULFATE 1 DOSE: .5; 2.5 SOLUTION RESPIRATORY (INHALATION) at 10:09

## 2025-02-16 RX ADMIN — INSULIN LISPRO 1 UNITS: 100 INJECTION, SOLUTION INTRAVENOUS; SUBCUTANEOUS at 08:51

## 2025-02-16 RX ADMIN — POTASSIUM CHLORIDE 40 MEQ: 1500 TABLET, EXTENDED RELEASE ORAL at 12:55

## 2025-02-16 RX ADMIN — GABAPENTIN 600 MG: 300 CAPSULE ORAL at 12:48

## 2025-02-16 RX ADMIN — BUMETANIDE 2 MG: 1 TABLET ORAL at 12:55

## 2025-02-16 RX ADMIN — GABAPENTIN 600 MG: 300 CAPSULE ORAL at 20:57

## 2025-02-16 RX ADMIN — METHYLPREDNISOLONE SODIUM SUCCINATE 40 MG: 40 INJECTION, POWDER, LYOPHILIZED, FOR SOLUTION INTRAMUSCULAR; INTRAVENOUS at 17:00

## 2025-02-16 RX ADMIN — METHYLPREDNISOLONE SODIUM SUCCINATE 40 MG: 40 INJECTION, POWDER, LYOPHILIZED, FOR SOLUTION INTRAMUSCULAR; INTRAVENOUS at 05:15

## 2025-02-16 RX ADMIN — BUPRENORPHINE HYDROCHLORIDE AND NALOXONE HYDROCHLORIDE DIHYDRATE 1 TABLET: 8; 2 TABLET SUBLINGUAL at 12:49

## 2025-02-16 RX ADMIN — POTASSIUM CHLORIDE 40 MEQ: 1500 TABLET, EXTENDED RELEASE ORAL at 17:00

## 2025-02-16 RX ADMIN — INSULIN LISPRO 3 UNITS: 100 INJECTION, SOLUTION INTRAVENOUS; SUBCUTANEOUS at 17:36

## 2025-02-16 RX ADMIN — BUPRENORPHINE HYDROCHLORIDE AND NALOXONE HYDROCHLORIDE DIHYDRATE 1 TABLET: 8; 2 TABLET SUBLINGUAL at 20:57

## 2025-02-16 RX ADMIN — INSULIN LISPRO 1 UNITS: 100 INJECTION, SOLUTION INTRAVENOUS; SUBCUTANEOUS at 13:01

## 2025-02-16 RX ADMIN — OSELTAMIVIR PHOSPHATE 75 MG: 75 CAPSULE ORAL at 01:07

## 2025-02-16 RX ADMIN — SODIUM CHLORIDE: 9 INJECTION, SOLUTION INTRAVENOUS at 21:11

## 2025-02-16 RX ADMIN — METHYLPREDNISOLONE SODIUM SUCCINATE 40 MG: 40 INJECTION, POWDER, LYOPHILIZED, FOR SOLUTION INTRAMUSCULAR; INTRAVENOUS at 20:57

## 2025-02-16 RX ADMIN — SODIUM CHLORIDE: 9 INJECTION, SOLUTION INTRAVENOUS at 17:55

## 2025-02-16 RX ADMIN — BUDESONIDE 500 MCG: 0.5 SUSPENSION RESPIRATORY (INHALATION) at 20:32

## 2025-02-16 RX ADMIN — BUMETANIDE 2 MG: 1 TABLET ORAL at 17:00

## 2025-02-16 RX ADMIN — SODIUM CHLORIDE: 9 INJECTION, SOLUTION INTRAVENOUS at 06:38

## 2025-02-16 RX ADMIN — INSULIN LISPRO 2 UNITS: 100 INJECTION, SOLUTION INTRAVENOUS; SUBCUTANEOUS at 22:17

## 2025-02-16 RX ADMIN — AZITHROMYCIN MONOHYDRATE 500 MG: 500 INJECTION, POWDER, LYOPHILIZED, FOR SOLUTION INTRAVENOUS at 05:20

## 2025-02-16 RX ADMIN — OSELTAMIVIR PHOSPHATE 75 MG: 75 CAPSULE ORAL at 08:49

## 2025-02-16 RX ADMIN — ARFORMOTEROL TARTRATE 15 MCG: 15 SOLUTION RESPIRATORY (INHALATION) at 20:32

## 2025-02-16 RX ADMIN — ENOXAPARIN SODIUM 30 MG: 100 INJECTION SUBCUTANEOUS at 08:50

## 2025-02-16 RX ADMIN — INSULIN LISPRO 1 UNITS: 100 INJECTION, SOLUTION INTRAVENOUS; SUBCUTANEOUS at 05:22

## 2025-02-16 RX ADMIN — MAGNESIUM SULFATE HEPTAHYDRATE 2000 MG: 40 INJECTION, SOLUTION INTRAVENOUS at 01:01

## 2025-02-16 ASSESSMENT — PAIN SCALES - GENERAL
PAINLEVEL_OUTOF10: 0
PAINLEVEL_OUTOF10: 0
PAINLEVEL_OUTOF10: 2

## 2025-02-16 ASSESSMENT — LIFESTYLE VARIABLES
HOW MANY STANDARD DRINKS CONTAINING ALCOHOL DO YOU HAVE ON A TYPICAL DAY: PATIENT DOES NOT DRINK
HOW MANY STANDARD DRINKS CONTAINING ALCOHOL DO YOU HAVE ON A TYPICAL DAY: 1 OR 2
HOW OFTEN DO YOU HAVE A DRINK CONTAINING ALCOHOL: MONTHLY OR LESS
HOW OFTEN DO YOU HAVE A DRINK CONTAINING ALCOHOL: NEVER

## 2025-02-16 NOTE — ED NOTES
Patient ambulated around nurses station. Patient's pulse ox went from 86% to 84%. Towards the end of the walk, patient did admit to being SOB with audible wheezes. Dr. Clement notified.

## 2025-02-16 NOTE — H&P
[facility] Hospitalist Group History and Physical      ASSESSMENT:      Principal Problem:    COPD with acute exacerbation (HCC)  Active Problems:    Influenza A    Type 2 diabetes mellitus (HCC)    Dependence on nicotine from cigarettes    Heroin abuse (HCC)    BMI 36.0-36.9,adult  Resolved Problems:    * No resolved hospital problems. *      PLAN:    COPD with exacerbation: POA, tachypnea, use of accessory muscles of breath.ordered scheduled duo-nebs q4hr while awake for 48hr & q2hr prn. Solu-medrol IV 40 mg q6hr & Azithromycin 500 mg x 3 days.  Tessalon Perles 100 mg q8hr prn.  Influenza A infection: Continue Tamiflu 75 mg x 5 days. Supportive care for symptom management.   Type II Diabetes: no recent A1c, added on. Held home metformin. Cont home glipizide 5 mg daily & Jardiance 25 mg daily. LDSSI with CC diet and Accu-cheks. Monitor for corticosteroid induced hyperglycemia.   Substance use disorder: Continue Suboxone 8-2 bid.   Tobacco Use Disorder: 1/2 ppd smoker. NRT ordered. Counseled on smoking cessation.    All services rendered & documented were deemed medically necessary by me and were appropriate for this patient's condition.    Consultations Ordered:  IP CONSULT TO VASCULAR ACCESS TEAM    I performed a comprehensive review of the patient's prior external notes, labs, & imaging, initial diagnostic tests, and other clinically relevant data.    Code Status: Full Code   VTE prophylaxis: Lovenox SQ daily  Dispo: admit to med surg      Patient is high risk for complications resulting in significant morbidity and/or Mortality    CHIEF COMPLAINT:  had concerns including Shortness of Breath (Onset yesterday) and Cough (Wheezing, hx copd).    History of Present Illness:      54-year-old male with a known history of COPD who presents to the emergency department with a one-day history of shortness of breath, cough, chest congestion and wheezing for the past day. He reports that his dyspnea has markedly

## 2025-02-16 NOTE — ED NOTES
ED to Inpatient Handoff Report    Notified [facility] that electronic handoff available and patient ready for transport to room 535.    Safety Risks: None identified    Patient in Restraints: no    Constant Observer or Patient : no    Telemetry Monitoring Ordered :Yes           Order to transfer to unit without monitor:YES    Last MEWS: 1 Time completed: 1615    Deterioration Index Score:   Predictive Model Details          20 (Normal)  Factor Value    Calculated 2/16/2025 16:17 54% Age 54 years old    Deterioration Index Model 18% WBC count abnormal (13.7 k/uL)     14% Pulse oximetry 91 %     5% Systolic 123     5% Sodium 134 mmol/L     3% Potassium 4.1 mmol/L     1% Temperature 97.5 °F (36.4 °C)     0% Hematocrit 46.1 %     0% Pulse 77     0% Respiratory rate 16        Vitals:    02/16/25 0520 02/16/25 0545 02/16/25 0555 02/16/25 1615   BP: 116/65   123/69   Pulse: 66 63 61 77   Resp: 24 18 17 16   Temp:    97.5 °F (36.4 °C)   TempSrc:    Oral   SpO2: (!) 86% 95% 95% 91%   Weight:       Height:             Opportunity for questions and clarification was provided.

## 2025-02-16 NOTE — PROGRESS NOTES
Called [facility] to confirm patient's suboxone dosing. They do not have on call services, but will leave a message for them to return call on Monday. Patient Outreach clinician is Azael.

## 2025-02-16 NOTE — CONSULTS
Jefe Shelton M.D.  Maldonado Pedersen D.O.  Markos Cruz M.D.  Alvina Santiago M.D.  Johnny Patel D.O.  Clarence House M.D.       Patient:  Scott De Luna 54 y.o. male MRN: [id number]     Date of Service: 2/16/2025      PULMONARY CONSULTATION    Reason for Consultation: resp failure  Referring Physician: Louie Loredo MD    Chief Complaint   Patient presents with    Shortness of Breath     Onset yesterday    Cough     Wheezing, hx copd         Code Status: Full Code        SUBJECTIVE:    HPI:  This is a 54-year-old male with history of COPD, tobacco use who presents with worsening shortness of breath, fever, cough.    Tells me he started feeling sick about 2 days prior.  Tells me several other coworkers also are sick with the flu.  Having chest congestion, wheezing and cough.  Cough is with some phlegm production.  Has been having fever, headache, body aches,  and general malaise.  Patient has O2 at home from previous admission.  Usually does not wear his oxygen.  Tells me that his saturations have been going low and he started wearing it more recently.    Patient unfortunate continues to smoke about 0.5 pack/day.    Past Medical History:   Diagnosis Date    Acute exacerbation of chronic obstructive pulmonary disease (COPD) (HCC) 3/14/2014    Acute respiratory failure with hypoxia 3/14/2014    CAP (community acquired pneumonia) 3/14/2014    Hepatitis C reactive 3/15/2014    Heroin abuse (HCC) 3/14/2014    Tobacco abuse 3/14/2014     Past Surgical History:   Procedure Laterality Date    TONSILLECTOMY       Family History   Problem Relation Age of Onset    Cancer Mother     Cancer Father          Social History:   Social History     Socioeconomic History    Marital status:      Spouse name: Not on file    Number of children: Not on file    Years of education: Not on file    Highest education level: Not on file   Occupational History    Not on file   Tobacco Use    Smoking status: Former     Current

## 2025-02-16 NOTE — PROGRESS NOTES
4 Eyes Skin Assessment     NAME:  Scott De Luna  YOB: 1970  MEDICAL RECORD NUMBER:  [id number]    The patient is being assessed for  Admission    I agree that at least one RN has performed a thorough Head to Toe Skin Assessment on the patient. ALL assessment sites listed below have been assessed.      Areas assessed by both nurses:    Head, Face, Ears, Shoulders, Back, Chest, Arms, Elbows, Hands, Sacrum. Buttock, Coccyx, Ischium, and Legs. Feet and Heels        Does the Patient have a Wound? No noted wound(s)       Bryce Prevention initiated by RN: No  Wound Care Orders initiated by RN: No    Pressure Injury (Stage 3,4, Unstageable, DTI, NWPT, and Complex wounds) if present, place Wound referral order by RN under : No    New Ostomies, if present place, Ostomy referral order under : No     Nurse 1 eSignature: Electronically signed by Iker Ross RN on 2/16/25 at 5:43 PM EST    **SHARE this note so that the co-signing nurse can place an eSignature**    Nurse 2 eSignature: Electronically signed by NANCY OLIVEROS RN on 2/16/25 at 5:45 PM EST

## 2025-02-16 NOTE — ED PROVIDER NOTES
dictations but occasionally words are mis-transcribed.)    Brett Huang, APRN - NP (electronically signed)     Brett Matihs APRN - NP  02/16/25 8204

## 2025-02-16 NOTE — PROGRESS NOTES
4 Eyes Skin Assessment     NAME:  Scott De Luna  YOB: 1970  MEDICAL RECORD NUMBER:  [id number]    The patient is being assessed for  Admission    I agree that at least one RN has performed a thorough Head to Toe Skin Assessment on the patient. ALL assessment sites listed below have been assessed.      Areas assessed by both nurses:    Head, Face, Ears, Shoulders, Back, Chest, Arms, Elbows, Hands, Sacrum. Buttock, Coccyx, Ischium, Legs. Feet and Heels, and Under Medical Devices         Does the Patient have a Wound? No noted wound(s)       Bryce Prevention initiated by RN: Yes  Wound Care Orders initiated by RN: No    Pressure Injury (Stage 3,4, Unstageable, DTI, NWPT, and Complex wounds) if present, place Wound referral order by RN under : No    New Ostomies, if present place, Ostomy referral order under : No     Nurse 1 eSignature: Electronically signed by Paula Link RN on 2/16/25 at 5:58 PM EST    **SHARE this note so that the co-signing nurse can place an eSignature**    Nurse 2 eSignature: {Esignature:[id number]}

## 2025-02-17 ENCOUNTER — APPOINTMENT (OUTPATIENT)
Dept: GENERAL RADIOLOGY | Age: 55
DRG: 193 | End: 2025-02-17
Payer: COMMERCIAL

## 2025-02-17 VITALS
BODY MASS INDEX: 35.56 KG/M2 | OXYGEN SATURATION: 93 % | HEART RATE: 67 BPM | TEMPERATURE: 97.5 F | WEIGHT: 262.57 LBS | RESPIRATION RATE: 18 BRPM | DIASTOLIC BLOOD PRESSURE: 71 MMHG | SYSTOLIC BLOOD PRESSURE: 113 MMHG | HEIGHT: 72 IN

## 2025-02-17 LAB
ANION GAP SERPL CALCULATED.3IONS-SCNC: 11 MMOL/L (ref 7–16)
BASOPHILS # BLD: 0.02 K/UL (ref 0–0.2)
BASOPHILS NFR BLD: 0 % (ref 0–2)
BUN SERPL-MCNC: 14 MG/DL (ref 6–20)
CALCIUM SERPL-MCNC: 8.5 MG/DL (ref 8.6–10.2)
CHLORIDE SERPL-SCNC: 101 MMOL/L (ref 98–107)
CO2 SERPL-SCNC: 27 MMOL/L (ref 22–29)
CREAT SERPL-MCNC: 0.5 MG/DL (ref 0.7–1.2)
EKG ATRIAL RATE: 71 BPM
EKG P AXIS: 32 DEGREES
EKG P-R INTERVAL: 132 MS
EKG Q-T INTERVAL: 374 MS
EKG QRS DURATION: 108 MS
EKG QTC CALCULATION (BAZETT): 406 MS
EKG R AXIS: 56 DEGREES
EKG T AXIS: 51 DEGREES
EKG VENTRICULAR RATE: 71 BPM
EOSINOPHIL # BLD: 0 K/UL (ref 0.05–0.5)
EOSINOPHILS RELATIVE PERCENT: 0 % (ref 0–6)
ERYTHROCYTE [DISTWIDTH] IN BLOOD BY AUTOMATED COUNT: 14.6 % (ref 11.5–15)
GFR, ESTIMATED: >90 ML/MIN/1.73M2
GLUCOSE BLD-MCNC: 218 MG/DL (ref 74–99)
GLUCOSE BLD-MCNC: 239 MG/DL (ref 74–99)
GLUCOSE SERPL-MCNC: 173 MG/DL (ref 74–99)
HCT VFR BLD AUTO: 45.7 % (ref 37–54)
HGB BLD-MCNC: 14 G/DL (ref 12.5–16.5)
IMM GRANULOCYTES # BLD AUTO: 0.13 K/UL (ref 0–0.58)
IMM GRANULOCYTES NFR BLD: 1 % (ref 0–5)
LYMPHOCYTES NFR BLD: 1.4 K/UL (ref 1.5–4)
LYMPHOCYTES RELATIVE PERCENT: 9 % (ref 20–42)
MCH RBC QN AUTO: 30 PG (ref 26–35)
MCHC RBC AUTO-ENTMCNC: 30.6 G/DL (ref 32–34.5)
MCV RBC AUTO: 97.9 FL (ref 80–99.9)
MONOCYTES NFR BLD: 0.55 K/UL (ref 0.1–0.95)
MONOCYTES NFR BLD: 4 % (ref 2–12)
NEUTROPHILS NFR BLD: 86 % (ref 43–80)
NEUTS SEG NFR BLD: 13.23 K/UL (ref 1.8–7.3)
PLATELET # BLD AUTO: 189 K/UL (ref 130–450)
PMV BLD AUTO: 11 FL (ref 7–12)
POTASSIUM SERPL-SCNC: 4.5 MMOL/L (ref 3.5–5)
PROCALCITONIN SERPL-MCNC: 0.03 NG/ML (ref 0–0.08)
RBC # BLD AUTO: 4.67 M/UL (ref 3.8–5.8)
SODIUM SERPL-SCNC: 139 MMOL/L (ref 132–146)
WBC OTHER # BLD: 15.3 K/UL (ref 4.5–11.5)

## 2025-02-17 PROCEDURE — 6370000000 HC RX 637 (ALT 250 FOR IP): Performed by: INTERNAL MEDICINE

## 2025-02-17 PROCEDURE — 6370000000 HC RX 637 (ALT 250 FOR IP): Performed by: HOSPITALIST

## 2025-02-17 PROCEDURE — 99239 HOSP IP/OBS DSCHRG MGMT >30: CPT | Performed by: STUDENT IN AN ORGANIZED HEALTH CARE EDUCATION/TRAINING PROGRAM

## 2025-02-17 PROCEDURE — 2580000003 HC RX 258: Performed by: HOSPITALIST

## 2025-02-17 PROCEDURE — 82962 GLUCOSE BLOOD TEST: CPT

## 2025-02-17 PROCEDURE — 85025 COMPLETE CBC W/AUTO DIFF WBC: CPT

## 2025-02-17 PROCEDURE — 2500000003 HC RX 250 WO HCPCS: Performed by: HOSPITALIST

## 2025-02-17 PROCEDURE — 93010 ELECTROCARDIOGRAM REPORT: CPT | Performed by: INTERNAL MEDICINE

## 2025-02-17 PROCEDURE — 6360000002 HC RX W HCPCS: Performed by: INTERNAL MEDICINE

## 2025-02-17 PROCEDURE — 36415 COLL VENOUS BLD VENIPUNCTURE: CPT

## 2025-02-17 PROCEDURE — 80048 BASIC METABOLIC PNL TOTAL CA: CPT

## 2025-02-17 PROCEDURE — 6360000002 HC RX W HCPCS: Performed by: HOSPITALIST

## 2025-02-17 PROCEDURE — 84145 PROCALCITONIN (PCT): CPT

## 2025-02-17 PROCEDURE — 94640 AIRWAY INHALATION TREATMENT: CPT

## 2025-02-17 PROCEDURE — 71045 X-RAY EXAM CHEST 1 VIEW: CPT

## 2025-02-17 PROCEDURE — 2700000000 HC OXYGEN THERAPY PER DAY

## 2025-02-17 RX ORDER — OSELTAMIVIR PHOSPHATE 75 MG/1
75 CAPSULE ORAL 2 TIMES DAILY
Qty: 6 CAPSULE | Refills: 0 | Status: SHIPPED | OUTPATIENT
Start: 2025-02-17 | End: 2025-02-20

## 2025-02-17 RX ORDER — PREDNISONE 10 MG/1
TABLET ORAL
Qty: 30 TABLET | Refills: 0 | Status: SHIPPED | OUTPATIENT
Start: 2025-02-17 | End: 2025-02-28

## 2025-02-17 RX ORDER — BENZONATATE 100 MG/1
100 CAPSULE ORAL 3 TIMES DAILY PRN
Qty: 21 CAPSULE | Refills: 0 | Status: SHIPPED | OUTPATIENT
Start: 2025-02-17 | End: 2025-02-24

## 2025-02-17 RX ORDER — AZITHROMYCIN 500 MG/1
500 TABLET, FILM COATED ORAL DAILY
Qty: 3 TABLET | Refills: 0 | Status: SHIPPED | OUTPATIENT
Start: 2025-02-17 | End: 2025-02-20

## 2025-02-17 RX ADMIN — POTASSIUM CHLORIDE 40 MEQ: 1500 TABLET, EXTENDED RELEASE ORAL at 09:43

## 2025-02-17 RX ADMIN — INSULIN LISPRO 1 UNITS: 100 INJECTION, SOLUTION INTRAVENOUS; SUBCUTANEOUS at 10:58

## 2025-02-17 RX ADMIN — IPRATROPIUM BROMIDE AND ALBUTEROL SULFATE 1 DOSE: .5; 2.5 SOLUTION RESPIRATORY (INHALATION) at 08:05

## 2025-02-17 RX ADMIN — AZITHROMYCIN MONOHYDRATE 500 MG: 500 INJECTION, POWDER, LYOPHILIZED, FOR SOLUTION INTRAVENOUS at 05:04

## 2025-02-17 RX ADMIN — BUPRENORPHINE HYDROCHLORIDE AND NALOXONE HYDROCHLORIDE DIHYDRATE 1 TABLET: 8; 2 TABLET SUBLINGUAL at 09:43

## 2025-02-17 RX ADMIN — BUMETANIDE 2 MG: 1 TABLET ORAL at 09:43

## 2025-02-17 RX ADMIN — GABAPENTIN 600 MG: 300 CAPSULE ORAL at 09:43

## 2025-02-17 RX ADMIN — BUDESONIDE 500 MCG: 0.5 SUSPENSION RESPIRATORY (INHALATION) at 08:05

## 2025-02-17 RX ADMIN — ENOXAPARIN SODIUM 30 MG: 100 INJECTION SUBCUTANEOUS at 09:43

## 2025-02-17 RX ADMIN — ARFORMOTEROL TARTRATE 15 MCG: 15 SOLUTION RESPIRATORY (INHALATION) at 08:05

## 2025-02-17 RX ADMIN — SODIUM CHLORIDE, PRESERVATIVE FREE 10 ML: 5 INJECTION INTRAVENOUS at 09:44

## 2025-02-17 RX ADMIN — INSULIN LISPRO 1 UNITS: 100 INJECTION, SOLUTION INTRAVENOUS; SUBCUTANEOUS at 05:11

## 2025-02-17 RX ADMIN — OSELTAMIVIR PHOSPHATE 75 MG: 75 CAPSULE ORAL at 09:43

## 2025-02-17 NOTE — PROGRESS NOTES
Jefe Shelton M.D.,[facility]  Maldonado Pedersen D.O., LUIS ALBERTO., [facility]  Markos Cruz M.D.  Alvina Santiago M.D.   Johnny Patel D.O.  Clarence House M.D.         Daily Pulmonary Progress Note    Patient:  Scott De Luna 54 y.o. male MRN: [id number]            Synopsis     We are following patient for acute hypoxic respiratory failure    \"CC\" shortness of breath    Code status: Full      Subjective      Patient was seen and examined.  Feeling better.  Oxygen weaned off.  Less chest tightness and wheezing.  Continues to smoke at home.  Wants to go home today.      Review of Systems:  Constitutional: Denies fever, weight loss, night sweats, and fatigue  Skin: Denies pigmentation, dark lesions, and rashes   HEENT: Denies hearing loss, tinnitus, ear drainage, epistaxis, sore throat, and hoarseness.  Cardiovascular: Denies palpitations, chest pain, and chest pressure.  Respiratory: Denies cough, dyspnea at rest, hemoptysis, apnea, and choking.  Improved cough dyspnea and wheezing  Gastrointestinal: Denies nausea, vomiting, poor appetite, diarrhea, heartburn or reflux  Genitourinary: Denies dysuria, frequency, urgency or hematuria  Musculoskeletal: Denies myalgias, muscle weakness, and bone pain  Neurological: Denies dizziness, vertigo, headache, and focal weakness  Psychological: Denies anxiety and depression  Endocrine: Denies heat intolerance and cold intolerance  Hematopoietic/Lymphatic: Denies bleeding problems and blood transfusions    24-hour events:  None    Objective   OBJECTIVE:   /71   Pulse 67   Temp 97.5 °F (36.4 °C) (Oral)   Resp 18   Ht 1.829 m (6')   Wt 119.1 kg (262 lb 9.1 oz)   SpO2 93%   BMI 35.61 kg/m²   SpO2 Readings from Last 1 Encounters:   02/17/25 93%        I/O:    Intake/Output Summary (Last 24 hours) at 2/17/2025 1404  Last data filed at 2/17/2025 0800  Gross per 24 hour   Intake 2627.05 ml   Output --   Net 2627.05 ml                      CURRENT MEDS :  Scheduled Meds:

## 2025-02-17 NOTE — DISCHARGE SUMMARY
[facility] Hospitalist Physician Discharge Summary       No follow-up provider specified.    Activity level: As tolerated     Dispo: home       Condition on discharge: Stable     Patient ID:  Scott De Luna  [id number]  54 y.o.  1970    Admit date: 2/15/2025    Discharge date and time:  2/17/2025  12:46 PM    Admission Diagnoses: Principal Problem:    COPD with acute exacerbation (HCC)  Active Problems:    Heroin abuse (HCC)    Dependence on nicotine from cigarettes    BMI 36.0-36.9,adult    Influenza A    Type 2 diabetes mellitus (HCC)  Resolved Problems:    * No resolved hospital problems. *      Discharge Diagnoses: Principal Problem:    COPD with acute exacerbation (HCC)  Active Problems:    Heroin abuse (HCC)    Dependence on nicotine from cigarettes    BMI 36.0-36.9,adult    Influenza A    Type 2 diabetes mellitus (HCC)  Resolved Problems:    * No resolved hospital problems. *      Consults:  IP CONSULT TO VASCULAR ACCESS TEAM    Procedures:     Hospital Course:     54-year-old male admitted for COPD exacerbation influenza A infection received Tamiflu steroid treatment symptom resolved the next day medically cleared by pulmonology to discharge home follow-up      Discharge Exam:    General Appearance: alert and oriented to person, place and time and in no acute distress  Skin: warm and dry  Head: normocephalic and atraumatic  Eyes: pupils equal, round, and reactive to light, extraocular eye movements intact, conjunctivae normal  Neck: neck supple and non tender without mass   Pulmonary/Chest: clear to auscultation bilaterally- no wheezes, rales or rhonchi, normal air movement, no respiratory distress  Cardiovascular: normal rate, normal S1 and S2 and no carotid bruits  Abdomen: soft, non-tender, non-distended, normal bowel sounds, no masses or organomegaly  Extremities: no cyanosis, no clubbing and no edema  Neurologic: no cranial nerve deficit and speech normal    I/O last 3 completed

## 2025-02-17 NOTE — PROGRESS NOTES
CLINICAL PHARMACY NOTE: MEDS TO BEDS    Total # of Prescriptions Filled: 1   The following medications were delivered to the patient:  Tamiflu 75 mg   Benzonatate 100 mg   Prednisone 10 mg   Azithromycin 500 mg     Additional Documentation:

## 2025-02-17 NOTE — CARE COORDINATION
Transition of Care-Met with patient bedside, introduced myself and CM role in care coordination. Patient is independent from home, resides with his girlfriend in a two story home. He HAS oxygen at home from [employer], does not use, also has a nebulizer. Admitted for COPD exacerbation. PCP is Dr. Patel, preferred pharmacy is [facility] in [place]. Discharge plan is home, no anticipated needs. Confirmed transportation home.    Jolene ARRIETA, RN  [facility]

## 2025-02-17 NOTE — PLAN OF CARE
Problem: Chronic Conditions and Co-morbidities  Goal: Patient's chronic conditions and co-morbidity symptoms are monitored and maintained or improved  2/17/2025 0015 by Starr Mullen RN  Outcome: Progressing  2/16/2025 1744 by Iker Ross RN  Outcome: Progressing     Problem: Discharge Planning  Goal: Discharge to home or other facility with appropriate resources  2/17/2025 0015 by Starr Mullen RN  Outcome: Progressing  2/16/2025 1744 by Iker Ross RN  Outcome: Progressing     Problem: Pain  Goal: Verbalizes/displays adequate comfort level or baseline comfort level  2/17/2025 0015 by Starr Mullen RN  Outcome: Progressing  2/16/2025 1744 by Iker Ross RN  Outcome: Progressing     Problem: Safety - Adult  Goal: Free from fall injury  2/17/2025 0015 by Starr Mullen RN  Outcome: Progressing  2/16/2025 1744 by Iker Ross RN  Outcome: Progressing

## 2025-02-18 ENCOUNTER — TELEPHONE (OUTPATIENT)
Dept: FAMILY MEDICINE CLINIC | Age: 55
End: 2025-02-18

## 2025-02-18 NOTE — TELEPHONE ENCOUNTER
Care Transitions Initial Follow Up Call    Outreach made within 2 business days of discharge: Yes    Patient: Scott De Luna Patient : 1970   MRN: [id number]  Reason for Admission: COPD with acute exacerbation    Discharge Date: 25       Spoke with: left vm to return call    Discharge department/facility: [facility]        Scheduled appointment with PCP within 7-14 days-- NO    Follow Up  No future appointments.          Paradise Colvin MA

## 2025-03-12 ENCOUNTER — APPOINTMENT (OUTPATIENT)
Dept: CT IMAGING | Age: 55
End: 2025-03-12
Payer: COMMERCIAL

## 2025-03-12 ENCOUNTER — APPOINTMENT (OUTPATIENT)
Dept: GENERAL RADIOLOGY | Age: 55
End: 2025-03-12
Payer: COMMERCIAL

## 2025-03-12 ENCOUNTER — HOSPITAL ENCOUNTER (EMERGENCY)
Age: 55
Discharge: HOME | End: 2025-03-12
Attending: EMERGENCY MEDICINE
Payer: COMMERCIAL

## 2025-03-12 VITALS
OXYGEN SATURATION: 95 % | HEART RATE: 86 BPM | SYSTOLIC BLOOD PRESSURE: 144 MMHG | DIASTOLIC BLOOD PRESSURE: 78 MMHG | RESPIRATION RATE: 15 BRPM | TEMPERATURE: 98 F

## 2025-03-12 DIAGNOSIS — J44.1 COPD EXACERBATION (HCC): Primary | ICD-10-CM

## 2025-03-12 DIAGNOSIS — J18.9 PNEUMONIA OF LEFT LOWER LOBE DUE TO INFECTIOUS ORGANISM: ICD-10-CM

## 2025-03-12 LAB
ANION GAP SERPL CALCULATED.3IONS-SCNC: 13 MMOL/L (ref 7–16)
BASOPHILS # BLD: 0.03 K/UL (ref 0–0.2)
BASOPHILS NFR BLD: 0 % (ref 0–2)
BNP SERPL-MCNC: 112 PG/ML (ref 0–125)
BUN SERPL-MCNC: 4 MG/DL (ref 6–20)
CALCIUM SERPL-MCNC: 9.1 MG/DL (ref 8.6–10.2)
CHLORIDE SERPL-SCNC: 101 MMOL/L (ref 98–107)
CO2 SERPL-SCNC: 23 MMOL/L (ref 22–29)
CREAT SERPL-MCNC: 0.6 MG/DL (ref 0.7–1.2)
D-DIMER QUANTITATIVE: 243 NG/ML DDU (ref 0–230)
EOSINOPHIL # BLD: 0.13 K/UL (ref 0.05–0.5)
EOSINOPHILS RELATIVE PERCENT: 1 % (ref 0–6)
ERYTHROCYTE [DISTWIDTH] IN BLOOD BY AUTOMATED COUNT: 14.5 % (ref 11.5–15)
GFR, ESTIMATED: >90 ML/MIN/1.73M2
GLUCOSE SERPL-MCNC: 106 MG/DL (ref 74–99)
HCT VFR BLD AUTO: 43.4 % (ref 37–54)
HGB BLD-MCNC: 13.9 G/DL (ref 12.5–16.5)
IMM GRANULOCYTES # BLD AUTO: 0.06 K/UL (ref 0–0.58)
IMM GRANULOCYTES NFR BLD: 1 % (ref 0–5)
LYMPHOCYTES NFR BLD: 2.16 K/UL (ref 1.5–4)
LYMPHOCYTES RELATIVE PERCENT: 19 % (ref 20–42)
MCH RBC QN AUTO: 30.2 PG (ref 26–35)
MCHC RBC AUTO-ENTMCNC: 32 G/DL (ref 32–34.5)
MCV RBC AUTO: 94.1 FL (ref 80–99.9)
MONOCYTES NFR BLD: 0.6 K/UL (ref 0.1–0.95)
MONOCYTES NFR BLD: 5 % (ref 2–12)
NEUTROPHILS NFR BLD: 74 % (ref 43–80)
NEUTS SEG NFR BLD: 8.58 K/UL (ref 1.8–7.3)
PLATELET # BLD AUTO: 186 K/UL (ref 130–450)
PMV BLD AUTO: 10.1 FL (ref 7–12)
POTASSIUM SERPL-SCNC: 3.7 MMOL/L (ref 3.5–5)
RBC # BLD AUTO: 4.61 M/UL (ref 3.8–5.8)
SODIUM SERPL-SCNC: 137 MMOL/L (ref 132–146)
TROPONIN I SERPL HS-MCNC: 7 NG/L (ref 0–11)
TROPONIN I SERPL HS-MCNC: <6 NG/L (ref 0–11)
WBC OTHER # BLD: 11.6 K/UL (ref 4.5–11.5)

## 2025-03-12 PROCEDURE — 96374 THER/PROPH/DIAG INJ IV PUSH: CPT

## 2025-03-12 PROCEDURE — 6360000004 HC RX CONTRAST MEDICATION: Performed by: RADIOLOGY

## 2025-03-12 PROCEDURE — 83880 ASSAY OF NATRIURETIC PEPTIDE: CPT

## 2025-03-12 PROCEDURE — 6370000000 HC RX 637 (ALT 250 FOR IP)

## 2025-03-12 PROCEDURE — 71275 CT ANGIOGRAPHY CHEST: CPT

## 2025-03-12 PROCEDURE — 85025 COMPLETE CBC W/AUTO DIFF WBC: CPT

## 2025-03-12 PROCEDURE — 84484 ASSAY OF TROPONIN QUANT: CPT

## 2025-03-12 PROCEDURE — 80048 BASIC METABOLIC PNL TOTAL CA: CPT

## 2025-03-12 PROCEDURE — 99285 EMERGENCY DEPT VISIT HI MDM: CPT

## 2025-03-12 PROCEDURE — 6360000002 HC RX W HCPCS

## 2025-03-12 PROCEDURE — 71045 X-RAY EXAM CHEST 1 VIEW: CPT

## 2025-03-12 PROCEDURE — 93005 ELECTROCARDIOGRAM TRACING: CPT

## 2025-03-12 PROCEDURE — 2500000003 HC RX 250 WO HCPCS

## 2025-03-12 PROCEDURE — 85379 FIBRIN DEGRADATION QUANT: CPT

## 2025-03-12 RX ORDER — IPRATROPIUM BROMIDE AND ALBUTEROL SULFATE 2.5; .5 MG/3ML; MG/3ML
3 SOLUTION RESPIRATORY (INHALATION) ONCE
Status: COMPLETED | OUTPATIENT
Start: 2025-03-12 | End: 2025-03-12

## 2025-03-12 RX ORDER — IOPAMIDOL 755 MG/ML
75 INJECTION, SOLUTION INTRAVASCULAR
Status: COMPLETED | OUTPATIENT
Start: 2025-03-12 | End: 2025-03-12

## 2025-03-12 RX ORDER — PREDNISONE 50 MG/1
50 TABLET ORAL DAILY
Qty: 5 TABLET | Refills: 0 | Status: SHIPPED | OUTPATIENT
Start: 2025-03-12 | End: 2025-03-17

## 2025-03-12 RX ORDER — CEFDINIR 300 MG/1
300 CAPSULE ORAL ONCE
Status: COMPLETED | OUTPATIENT
Start: 2025-03-12 | End: 2025-03-12

## 2025-03-12 RX ORDER — DOXYCYCLINE 100 MG/1
100 CAPSULE ORAL ONCE
Status: COMPLETED | OUTPATIENT
Start: 2025-03-12 | End: 2025-03-12

## 2025-03-12 RX ORDER — CEFDINIR 300 MG/1
300 CAPSULE ORAL 2 TIMES DAILY
Qty: 14 CAPSULE | Refills: 0 | Status: SHIPPED | OUTPATIENT
Start: 2025-03-12 | End: 2025-03-19

## 2025-03-12 RX ORDER — SODIUM CHLORIDE 0.9 % (FLUSH) 0.9 %
10 SYRINGE (ML) INJECTION
Status: DISCONTINUED | OUTPATIENT
Start: 2025-03-12 | End: 2025-03-12 | Stop reason: HOSPADM

## 2025-03-12 RX ORDER — DOXYCYCLINE 100 MG/1
100 CAPSULE ORAL 2 TIMES DAILY
Qty: 14 CAPSULE | Refills: 0 | Status: SHIPPED | OUTPATIENT
Start: 2025-03-12 | End: 2025-03-19

## 2025-03-12 RX ADMIN — IOPAMIDOL 75 ML: 755 INJECTION, SOLUTION INTRAVENOUS at 16:40

## 2025-03-12 RX ADMIN — DOXYCYCLINE 100 MG: 100 CAPSULE ORAL at 18:18

## 2025-03-12 RX ADMIN — WATER 125 MG: 1 INJECTION INTRAMUSCULAR; INTRAVENOUS; SUBCUTANEOUS at 15:22

## 2025-03-12 RX ADMIN — IPRATROPIUM BROMIDE AND ALBUTEROL SULFATE 3 DOSE: 2.5; .5 SOLUTION RESPIRATORY (INHALATION) at 16:16

## 2025-03-12 RX ADMIN — CEFDINIR 300 MG: 300 CAPSULE ORAL at 18:18

## 2025-03-12 ASSESSMENT — PAIN - FUNCTIONAL ASSESSMENT: PAIN_FUNCTIONAL_ASSESSMENT: NONE - DENIES PAIN

## 2025-03-12 NOTE — ED PROVIDER NOTES
Department of Emergency Medicine     Written by: Chi Hare MD  Patient Name: Scott De Luna  Admit Date: 3/12/2025  2:25 PM  MRN: [id number]                   : 1970    HPI     Chief Complaint   Patient presents with    Chest Pain     Patient c/o chest pain starting this am , hx COPD , given duoneb treatment PTA        Scott De Luna is a 54 y.o. male that presents to the ED due to left-sided chest pain along with shortness of breath started 45 minutes ago.  The patient was driving during this time.  He has a history of COPD however not felt like this in the past.  Called EMS, he was given 4 baby aspirin a day along with 3 DuoNebs and his shortness of breath improved.  He says his chest pain is his left anterior chest.  It does not hurt to touch.  He has had no recent cough congestion rhinorrhea other than a month ago.  No fevers or chills or sweats.  He has leg swellings however he says this is chronic.    Review of systems   Pertinent positives and negatives mentioned in the HPI/MDM.      Physical   Physical Exam  Constitutional:       General: He is not in acute distress.     Appearance: Normal appearance.   Eyes:      Extraocular Movements: Extraocular movements intact.      Pupils: Pupils are equal, round, and reactive to light.   Cardiovascular:      Rate and Rhythm: Normal rate and regular rhythm.      Pulses: Normal pulses.      Heart sounds: Normal heart sounds.   Pulmonary:      Effort: Pulmonary effort is normal. No respiratory distress.      Breath sounds: Wheezing present.   Chest:      Chest wall: No tenderness.   Abdominal:      General: Abdomen is flat. There is no distension.      Palpations: Abdomen is soft.      Tenderness: There is no abdominal tenderness.   Musculoskeletal:         General: Swelling present.   Skin:     General: Skin is warm and dry.      Capillary Refill: Capillary refill takes less than 2 seconds.      Coloration: Skin is not jaundiced or pale.

## 2025-03-12 NOTE — DISCHARGE INSTRUCTIONS
Please return to ED if symptoms worsen, persist, or occur.  Please follow-up with PCP.  Please take your medications as prescribed.    CTA PULMONARY W CONTRAST   Final Result   1. Lingular pneumonia and very small infiltrate seen within the left lower   lobe inferiorly   2. Emphysematous changes and findings of interstitial pulmonary fibrosis.   3. There is no pulmonary embolus.   .         XR CHEST PORTABLE   Final Result      1. Mildly diminished inspiratory effort with subsegmental basilar atelectasis.

## 2025-03-13 LAB
EKG ATRIAL RATE: 70 BPM
EKG P AXIS: 35 DEGREES
EKG P-R INTERVAL: 138 MS
EKG Q-T INTERVAL: 394 MS
EKG QRS DURATION: 118 MS
EKG QTC CALCULATION (BAZETT): 425 MS
EKG R AXIS: 45 DEGREES
EKG T AXIS: 40 DEGREES
EKG VENTRICULAR RATE: 70 BPM

## 2025-03-13 PROCEDURE — 93010 ELECTROCARDIOGRAM REPORT: CPT | Performed by: INTERNAL MEDICINE
